# Patient Record
Sex: MALE | Race: ASIAN | NOT HISPANIC OR LATINO
[De-identification: names, ages, dates, MRNs, and addresses within clinical notes are randomized per-mention and may not be internally consistent; named-entity substitution may affect disease eponyms.]

---

## 2017-06-02 ENCOUNTER — APPOINTMENT (OUTPATIENT)
Dept: ELECTROPHYSIOLOGY | Facility: CLINIC | Age: 77
End: 2017-06-02

## 2017-08-22 ENCOUNTER — NON-APPOINTMENT (OUTPATIENT)
Age: 77
End: 2017-08-22

## 2017-08-22 ENCOUNTER — APPOINTMENT (OUTPATIENT)
Dept: ELECTROPHYSIOLOGY | Facility: CLINIC | Age: 77
End: 2017-08-22
Payer: MEDICAID

## 2017-08-22 VITALS
DIASTOLIC BLOOD PRESSURE: 78 MMHG | WEIGHT: 109 LBS | HEIGHT: 66 IN | HEART RATE: 60 BPM | OXYGEN SATURATION: 99 % | SYSTOLIC BLOOD PRESSURE: 128 MMHG | BODY MASS INDEX: 17.52 KG/M2

## 2017-08-22 PROCEDURE — 93283 PRGRMG EVAL IMPLANTABLE DFB: CPT

## 2017-12-18 ENCOUNTER — NON-APPOINTMENT (OUTPATIENT)
Age: 77
End: 2017-12-18

## 2017-12-18 ENCOUNTER — APPOINTMENT (OUTPATIENT)
Dept: ELECTROPHYSIOLOGY | Facility: CLINIC | Age: 77
End: 2017-12-18
Payer: MEDICAID

## 2017-12-18 VITALS
BODY MASS INDEX: 20.55 KG/M2 | HEIGHT: 66 IN | WEIGHT: 127.87 LBS | SYSTOLIC BLOOD PRESSURE: 114 MMHG | OXYGEN SATURATION: 97 % | DIASTOLIC BLOOD PRESSURE: 70 MMHG | HEART RATE: 60 BPM

## 2017-12-18 PROCEDURE — 93283 PRGRMG EVAL IMPLANTABLE DFB: CPT

## 2018-08-14 ENCOUNTER — APPOINTMENT (OUTPATIENT)
Dept: ELECTROPHYSIOLOGY | Facility: CLINIC | Age: 78
End: 2018-08-14
Payer: MEDICAID

## 2018-08-14 VITALS
WEIGHT: 125.66 LBS | SYSTOLIC BLOOD PRESSURE: 125 MMHG | BODY MASS INDEX: 20.28 KG/M2 | OXYGEN SATURATION: 98 % | DIASTOLIC BLOOD PRESSURE: 74 MMHG | HEART RATE: 60 BPM

## 2018-08-14 PROCEDURE — 93283 PRGRMG EVAL IMPLANTABLE DFB: CPT

## 2019-02-20 ENCOUNTER — APPOINTMENT (OUTPATIENT)
Dept: ELECTROPHYSIOLOGY | Facility: CLINIC | Age: 79
End: 2019-02-20

## 2020-04-30 ENCOUNTER — TRANSCRIPTION ENCOUNTER (OUTPATIENT)
Age: 80
End: 2020-04-30

## 2023-05-19 ENCOUNTER — OUTPATIENT (OUTPATIENT)
Dept: OUTPATIENT SERVICES | Facility: HOSPITAL | Age: 83
LOS: 1 days | End: 2023-05-19
Payer: COMMERCIAL

## 2023-05-19 ENCOUNTER — APPOINTMENT (OUTPATIENT)
Dept: CT IMAGING | Facility: HOSPITAL | Age: 83
End: 2023-05-19
Payer: MEDICAID

## 2023-05-19 ENCOUNTER — RESULT REVIEW (OUTPATIENT)
Age: 83
End: 2023-05-19

## 2023-05-19 ENCOUNTER — APPOINTMENT (OUTPATIENT)
Dept: ENDOVASCULAR SURGERY | Facility: CLINIC | Age: 83
End: 2023-05-19
Payer: MEDICAID

## 2023-05-19 DIAGNOSIS — Z95.5 PRESENCE OF CORONARY ANGIOPLASTY IMPLANT AND GRAFT: Chronic | ICD-10-CM

## 2023-05-19 DIAGNOSIS — I71.40 ABDOMINAL AORTIC ANEURYSM, WITHOUT RUPTURE, UNSPECIFIED: ICD-10-CM

## 2023-05-19 DIAGNOSIS — Z95.1 PRESENCE OF AORTOCORONARY BYPASS GRAFT: Chronic | ICD-10-CM

## 2023-05-19 PROCEDURE — 99204 OFFICE O/P NEW MOD 45 MIN: CPT

## 2023-05-19 PROCEDURE — 74174 CTA ABD&PLVS W/CONTRAST: CPT | Mod: 26

## 2023-05-19 PROCEDURE — 74174 CTA ABD&PLVS W/CONTRAST: CPT

## 2023-05-19 NOTE — ASSESSMENT
[FreeTextEntry1] : 83-year-old gentleman with an 8.5 cm abdominal aortic aneurysm.  Given the large size of the aneurysm would recommend urgent CT scan.  Once the results are ascertained patient to be scheduled urgently for repair. \par Patient sent for urgent creatinine.  Patient to continue on aspirin and atorvastatin.

## 2023-05-19 NOTE — PHYSICAL EXAM
[JVD] : no jugular venous distention  [Normal Breath Sounds] : Normal breath sounds [Normal Rate and Rhythm] : normal rate and rhythm [2+] : left 2+ [Ankle Swelling (On Exam)] : not present [Varicose Veins Of Lower Extremities] : not present [] : not present [Abdomen Tenderness] : ~T ~M No abdominal tenderness [No Rash or Lesion] : No rash or lesion [Skin Ulcer] : no ulcer [Alert] : alert [Calm] : calm [de-identified] : Appears well

## 2023-05-19 NOTE — HISTORY OF PRESENT ILLNESS
[FreeTextEntry1] : 83-year-old gentleman former heavy smoker with multiple medical problems including coronary artery disease status post CABG in [place] several years ago, AICD presents to the office today with a report of a large abdominal aortic aneurysm.  The patient's granddaughter states that several weeks ago the patient was lying down and he felt a pulsatile mass in his abdomen.  Patient went a duplex study which showed an 8.5 cm abdominal aortic aneurysm.  Patient does not have complaints of abdominal or back pain.  He presents urgently for evaluation.

## 2023-05-21 ENCOUNTER — INPATIENT (INPATIENT)
Facility: HOSPITAL | Age: 83
LOS: 6 days | Discharge: HOME | DRG: 269 | End: 2023-05-28
Attending: SURGERY | Admitting: SURGERY
Payer: MEDICAID

## 2023-05-21 VITALS
SYSTOLIC BLOOD PRESSURE: 113 MMHG | HEART RATE: 61 BPM | DIASTOLIC BLOOD PRESSURE: 72 MMHG | WEIGHT: 125.66 LBS | TEMPERATURE: 98 F | HEIGHT: 66 IN | RESPIRATION RATE: 18 BRPM | OXYGEN SATURATION: 99 %

## 2023-05-21 DIAGNOSIS — I71.40 ABDOMINAL AORTIC ANEURYSM, WITHOUT RUPTURE, UNSPECIFIED: ICD-10-CM

## 2023-05-21 DIAGNOSIS — Z95.1 PRESENCE OF AORTOCORONARY BYPASS GRAFT: Chronic | ICD-10-CM

## 2023-05-21 DIAGNOSIS — Z95.810 PRESENCE OF AUTOMATIC (IMPLANTABLE) CARDIAC DEFIBRILLATOR: Chronic | ICD-10-CM

## 2023-05-21 DIAGNOSIS — Z95.5 PRESENCE OF CORONARY ANGIOPLASTY IMPLANT AND GRAFT: Chronic | ICD-10-CM

## 2023-05-21 LAB
ALBUMIN SERPL ELPH-MCNC: 3.8 G/DL — SIGNIFICANT CHANGE UP (ref 3.3–5)
ALP SERPL-CCNC: 82 U/L — SIGNIFICANT CHANGE UP (ref 40–120)
ALT FLD-CCNC: 25 U/L — SIGNIFICANT CHANGE UP (ref 10–45)
ANION GAP SERPL CALC-SCNC: 12 MMOL/L — SIGNIFICANT CHANGE UP (ref 5–17)
APTT BLD: 39.4 SEC — HIGH (ref 27.5–35.5)
AST SERPL-CCNC: 35 U/L — SIGNIFICANT CHANGE UP (ref 10–40)
BASOPHILS # BLD AUTO: 0.01 K/UL — SIGNIFICANT CHANGE UP (ref 0–0.2)
BASOPHILS NFR BLD AUTO: 0.2 % — SIGNIFICANT CHANGE UP (ref 0–2)
BILIRUB SERPL-MCNC: 0.5 MG/DL — SIGNIFICANT CHANGE UP (ref 0.2–1.2)
BLD GP AB SCN SERPL QL: POSITIVE — SIGNIFICANT CHANGE UP
BUN SERPL-MCNC: 18 MG/DL — SIGNIFICANT CHANGE UP (ref 7–23)
CALCIUM SERPL-MCNC: 8.8 MG/DL — SIGNIFICANT CHANGE UP (ref 8.4–10.5)
CHLORIDE SERPL-SCNC: 104 MMOL/L — SIGNIFICANT CHANGE UP (ref 96–108)
CO2 SERPL-SCNC: 22 MMOL/L — SIGNIFICANT CHANGE UP (ref 22–31)
CREAT SERPL-MCNC: 0.96 MG/DL — SIGNIFICANT CHANGE UP (ref 0.5–1.3)
EGFR: 78 ML/MIN/1.73M2 — SIGNIFICANT CHANGE UP
EOSINOPHIL # BLD AUTO: 0.27 K/UL — SIGNIFICANT CHANGE UP (ref 0–0.5)
EOSINOPHIL NFR BLD AUTO: 5.6 % — SIGNIFICANT CHANGE UP (ref 0–6)
GLUCOSE SERPL-MCNC: 93 MG/DL — SIGNIFICANT CHANGE UP (ref 70–99)
HCT VFR BLD CALC: 35 % — LOW (ref 39–50)
HGB BLD-MCNC: 11.2 G/DL — LOW (ref 13–17)
IMM GRANULOCYTES NFR BLD AUTO: 0.6 % — SIGNIFICANT CHANGE UP (ref 0–0.9)
INR BLD: 1.43 RATIO — HIGH (ref 0.88–1.16)
LYMPHOCYTES # BLD AUTO: 1.68 K/UL — SIGNIFICANT CHANGE UP (ref 1–3.3)
LYMPHOCYTES # BLD AUTO: 34.8 % — SIGNIFICANT CHANGE UP (ref 13–44)
MAGNESIUM SERPL-MCNC: 1.9 MG/DL — SIGNIFICANT CHANGE UP (ref 1.6–2.6)
MCHC RBC-ENTMCNC: 28.9 PG — SIGNIFICANT CHANGE UP (ref 27–34)
MCHC RBC-ENTMCNC: 32 GM/DL — SIGNIFICANT CHANGE UP (ref 32–36)
MCV RBC AUTO: 90.4 FL — SIGNIFICANT CHANGE UP (ref 80–100)
MONOCYTES # BLD AUTO: 0.78 K/UL — SIGNIFICANT CHANGE UP (ref 0–0.9)
MONOCYTES NFR BLD AUTO: 16.1 % — HIGH (ref 2–14)
NEUTROPHILS # BLD AUTO: 2.06 K/UL — SIGNIFICANT CHANGE UP (ref 1.8–7.4)
NEUTROPHILS NFR BLD AUTO: 42.7 % — LOW (ref 43–77)
NRBC # BLD: 0 /100 WBCS — SIGNIFICANT CHANGE UP (ref 0–0)
PLATELET # BLD AUTO: 172 K/UL — SIGNIFICANT CHANGE UP (ref 150–400)
POTASSIUM SERPL-MCNC: 4.4 MMOL/L — SIGNIFICANT CHANGE UP (ref 3.5–5.3)
POTASSIUM SERPL-SCNC: 4.4 MMOL/L — SIGNIFICANT CHANGE UP (ref 3.5–5.3)
PROT SERPL-MCNC: 7.3 G/DL — SIGNIFICANT CHANGE UP (ref 6–8.3)
PROTHROM AB SERPL-ACNC: 16.7 SEC — HIGH (ref 10.5–13.4)
RBC # BLD: 3.87 M/UL — LOW (ref 4.2–5.8)
RBC # FLD: 14.6 % — HIGH (ref 10.3–14.5)
RH IG SCN BLD-IMP: POSITIVE — SIGNIFICANT CHANGE UP
SODIUM SERPL-SCNC: 138 MMOL/L — SIGNIFICANT CHANGE UP (ref 135–145)
WBC # BLD: 4.83 K/UL — SIGNIFICANT CHANGE UP (ref 3.8–10.5)
WBC # FLD AUTO: 4.83 K/UL — SIGNIFICANT CHANGE UP (ref 3.8–10.5)

## 2023-05-21 PROCEDURE — 71045 X-RAY EXAM CHEST 1 VIEW: CPT | Mod: 26

## 2023-05-21 PROCEDURE — 86077 PHYS BLOOD BANK SERV XMATCH: CPT

## 2023-05-21 PROCEDURE — 99285 EMERGENCY DEPT VISIT HI MDM: CPT

## 2023-05-21 RX ORDER — ISOSORBIDE MONONITRATE 60 MG/1
30 TABLET, EXTENDED RELEASE ORAL DAILY
Refills: 0 | Status: DISCONTINUED | OUTPATIENT
Start: 2023-05-21 | End: 2023-05-25

## 2023-05-21 RX ORDER — CARVEDILOL PHOSPHATE 80 MG/1
3.12 CAPSULE, EXTENDED RELEASE ORAL EVERY 12 HOURS
Refills: 0 | Status: DISCONTINUED | OUTPATIENT
Start: 2023-05-21 | End: 2023-05-25

## 2023-05-21 RX ORDER — PANTOPRAZOLE SODIUM 20 MG/1
40 TABLET, DELAYED RELEASE ORAL
Refills: 0 | Status: DISCONTINUED | OUTPATIENT
Start: 2023-05-21 | End: 2023-05-25

## 2023-05-21 RX ORDER — ATORVASTATIN CALCIUM 80 MG/1
80 TABLET, FILM COATED ORAL AT BEDTIME
Refills: 0 | Status: DISCONTINUED | OUTPATIENT
Start: 2023-05-21 | End: 2023-05-25

## 2023-05-21 RX ORDER — LISINOPRIL 2.5 MG/1
10 TABLET ORAL DAILY
Refills: 0 | Status: DISCONTINUED | OUTPATIENT
Start: 2023-05-21 | End: 2023-05-25

## 2023-05-21 RX ORDER — FUROSEMIDE 40 MG
20 TABLET ORAL DAILY
Refills: 0 | Status: DISCONTINUED | OUTPATIENT
Start: 2023-05-21 | End: 2023-05-25

## 2023-05-21 RX ORDER — HEPARIN SODIUM 5000 [USP'U]/ML
5000 INJECTION INTRAVENOUS; SUBCUTANEOUS EVERY 8 HOURS
Refills: 0 | Status: DISCONTINUED | OUTPATIENT
Start: 2023-05-21 | End: 2023-05-25

## 2023-05-21 RX ORDER — ASPIRIN/CALCIUM CARB/MAGNESIUM 324 MG
81 TABLET ORAL DAILY
Refills: 0 | Status: DISCONTINUED | OUTPATIENT
Start: 2023-05-21 | End: 2023-05-25

## 2023-05-21 RX ADMIN — Medication 81 MILLIGRAM(S): at 23:47

## 2023-05-21 RX ADMIN — CARVEDILOL PHOSPHATE 3.12 MILLIGRAM(S): 80 CAPSULE, EXTENDED RELEASE ORAL at 23:47

## 2023-05-21 RX ADMIN — ATORVASTATIN CALCIUM 80 MILLIGRAM(S): 80 TABLET, FILM COATED ORAL at 23:47

## 2023-05-21 RX ADMIN — HEPARIN SODIUM 5000 UNIT(S): 5000 INJECTION INTRAVENOUS; SUBCUTANEOUS at 23:47

## 2023-05-21 NOTE — H&P ADULT - HISTORY OF PRESENT ILLNESS
Plan:  - admit to Dr. Tellez, floor bed, needs tele floor  - plan for open AAA repair on Wednesday  - no need to repeat CT, recently CTA done  - will need cardiology consult from Dr. Theo Chung for optimization and risk clarification   - will need AICU interrogation   - pending patient eval, still in waiting room  - plan discussed with fellow on call on behalf of Dr. Geoff Hua, Ding PGY-2  Vascular Surgery  [id number]  83-year-old gentleman PMH HTN, HLD, CM, CAD ( S/P 3 vessel CABG 2002), PCI to RCA 4/11/16, life vest x 3 months, former smoker ( 80 pack years),  AICD presented to the Dr. Tellez's office on 5/17/2032 with a report of a large abdominal aortic aneurysm. The patient's granddaughter stated that several weeks ago the patient was lying down and he felt a pulsatile mass in his abdomen. Patient went a duplex study which showed an 8.5 cm abdominal aortic aneurysm. Patient does not have complaints of abdominal or back pain. CTA outpatient on 5/19/2023 showed  infrarenal abdominal aortic aneurysm measuring 7.1 cm in diameter containing a large amount of mural thrombus. Patient presented to ER today for preop monitor and optimization.      83-year-old gentleman PMH HTN, HLD, CM, CAD ( S/P 3 vessel CABG 2002), PCI to RCA 4/11/16, life vest x 3 months, former smoker ( 80 pack years),  AICD presented to the Dr. Tellez's office on 5/17/2032 with a report of a large abdominal aortic aneurysm. The patient's granddaughter stated that several weeks ago the patient was lying down and he felt a pulsatile mass in his abdomen. Patient went a duplex study which showed an 8.5 cm abdominal aortic aneurysm. Patient does not have complaints of abdominal or back pain. CTA outpatient on 5/19/2023 showed  infrarenal abdominal aortic aneurysm measuring 7.1 cm in diameter containing a large amount of mural thrombus. Patient presented to ER today for preop monitor and optimization.     In ER, patient is HDS, no complains, palpable pulsatile mass in mid abd.

## 2023-05-21 NOTE — ED PROVIDER NOTE - OBJECTIVE STATEMENT
83-year-old gentleman PMH HTN, HLD, CM, CAD ( S/P 3 vessel CABG 2002), PCI to RCA 4/11/16, life vest x 3 months, former smoker ( 80 pack years),  AICD presented to the Dr. Tellez's office on 5/17/2032 with a report of a large abdominal aortic aneurysm. The patient's granddaughter stated that several weeks ago the patient was lying down and he felt a pulsatile mass in his abdomen. Patient went a duplex study which showed an 8.5 cm abdominal aortic aneurysm. Patient does not have complaints of abdominal or back pain. CTA outpatient on 5/19/2023 showed  infrarenal abdominal aortic aneurysm measuring 7.1 cm in diameter containing a large amount of mural thrombus. Patient presented to ER today for preop monitor and optimization.

## 2023-05-21 NOTE — ED ADULT NURSE NOTE - NSFALLUNIVINTERV_ED_ALL_ED
Bed/Stretcher in lowest position, wheels locked, appropriate side rails in place/Call bell, personal items and telephone in reach/Instruct patient to call for assistance before getting out of bed/chair/stretcher/Non-slip footwear applied when patient is off stretcher/[place] to call system/Physically safe environment - no spills, clutter or unnecessary equipment/Purposeful proactive rounding/Room/bathroom lighting operational, light cord in reach

## 2023-05-21 NOTE — ED ADULT NURSE NOTE - OBJECTIVE STATEMENT
Pt is an 83y M PMH HTN, HLD, AAA, CAD s/p CABG sent for pre-op blood work for AAA repair. Pt denies any complaints at this time. Sent for pre-op lab work. 20G IV placed in R AC. A&Ox4, [place], lungs clear, distal pulses intact, abdomen soft, skin intact. Side rails up for safety, call bell and personal items within reach, instructed to call for assistance, verbalizes understanding. Will continue to monitor.

## 2023-05-21 NOTE — H&P ADULT - NSHPSOCIALHISTORY_GEN_ALL_CORE
living at home with wife, independent  former smoker, 80 pack years, quitted on 2002  denies alcohol use

## 2023-05-21 NOTE — ED PROVIDER NOTE - ATTENDING CONTRIBUTION TO CARE
I, Najma Pham, performed a history and physical exam of the patient and discussed their management with the resident and /or advanced care provider. I reviewed the resident and /or ACP's note and agree with the documented findings and plan of care. I was present and available for all procedures.     83-year-old male with history of hypertension, hyperlipidemia, CM, CAD status post three-vessel CABG in 2002, PCI to RCA/11/16, LifeVest x3 months, former smoker, AICD presenting to the ED for admission.  Patient seen by Dr. Tellez on 5/17/2023 with reported large abdominal aortic aneurysm.  Granddaughter reports several weeks ago patient was lying down and he felt a pulsatile mass in his abdomen.  Had a duplex study which showed an 8.5 cm abdominal aortic aneurysm.  Patient with no abdominal complaints back pain fever chills chest pain lightheadedness or dizziness.  CTA outpatient 5/19/2023 showed infrarenal abdominal aortic aneurysm measuring 7.1 cm in diameter containing a large amount of mural thrombus.  Patient sent to the ED today for preop monitoring optimization for surgical repair later this week.  Well-appearing in no acute distress resting comfortably on stretcher.  Regular rate and rhythm, lungs clear to auscultation bilaterally speaking in full sentences without increased work of breathing.  Saturating well on room air.  Abdomen soft nontender no rebound or guarding.  No CVA tenderness.  No lower extremity edema.  Will obtain preop labs, vascular surgery consultation and admission.

## 2023-05-21 NOTE — H&P ADULT - NSICDXFAMILYHX_GEN_ALL_CORE_FT
FAMILY HISTORY:  Sibling  Still living? Unknown  Diabetes mellitus, Age at diagnosis: Age Unknown

## 2023-05-21 NOTE — H&P ADULT - ASSESSMENT
83-year-old gentleman PMH HTN, HLD, CM, CAD ( S/P 3 vessel CABG 2002), PCI to RCA 4/11/16, life vest x 3 months, former smoker ( 80 pack years),  AICD presented to the Dr. Tellez's office on 5/17/2032 with a report of a large abdominal aortic aneurysm. The patient's granddaughter stated that several weeks ago the patient was lying down and he felt a pulsatile mass in his abdomen. Patient went a duplex study which showed an 8.5 cm abdominal aortic aneurysm. Patient does not have complaints of abdominal or back pain. CTA outpatient on 5/19/2023 showed  infrarenal abdominal aortic aneurysm measuring 7.1 cm in diameter containing a large amount of mural thrombus. Patient presented to ER today for preop monitor and optimization.     In ER, patient is HDS, no complains, palpable pulsatile mass in mid abd.     Plan:  - admit to vascular surgery, Dr. Tellez, floor bed with telemetry   - planning for open AAA repair on Wednesday (5/24/2023)  - no need for CTA, had recently CTA on 5/19/2023  - will need cardiology consult from Dr. Theo Chung for risk stratification and optimization  - will need acid interrogation before surgery  - will need medicine consult for risk stratification and optimization  - preop labs  - regular diet  - plan discussed with fellow on call on behalf of Dr. Geoff Hua, Ding PGY-2  Vascular Surgery  [id number]  83-year-old gentleman PMH HTN, HLD, CM, CAD ( S/P 3 vessel CABG 2002), PCI to RCA 4/11/16, life vest x 3 months, former smoker ( 80 pack years),  AICD presented to the Dr. Tellez's office on 5/17/2032 with a report of a large abdominal aortic aneurysm. The patient's granddaughter stated that several weeks ago the patient was lying down and he felt a pulsatile mass in his abdomen. Patient went a duplex study which showed an 8.5 cm abdominal aortic aneurysm. Patient does not have complaints of abdominal or back pain. CTA outpatient on 5/19/2023 showed  infrarenal abdominal aortic aneurysm measuring 7.1 cm in diameter containing a large amount of mural thrombus. Patient presented to ER today for preop monitor and optimization.     In ER, patient is HDS, no complains, palpable pulsatile mass in mid abd.     Plan:  - admit to vascular surgery, Dr. Tellez, floor bed with telemetry   - planning for EVAR on Wednesday (5/24/2023)  - no need for CTA, had recently CTA on 5/19/2023  - will need cardiology consult from Dr. Theo Chung for risk stratification and optimization  - will need acid interrogation before surgery  - will need medicine consult for risk stratification and optimization  - preop labs  - regular diet  - plan discussed with fellow on call on behalf of Dr. Geoff Hua, Ding PGY-2  Vascular Surgery  [id number]

## 2023-05-21 NOTE — H&P ADULT - NSHPPHYSICALEXAM_GEN_ALL_CORE
General: NAD, Lying in bed comfortably  Neuro: A+Ox3  HEENT: NC/AT, EOMI  Neck: Soft, supple  Cardio: RRR, nml S1/S2  Resp: Good effort, CTA b/l  Thorax: No chest wall tenderness  Breast: No lesions/masses, no drainage  GI/Abd: Soft, NT/ND, no rebound/guarding,  palpable pulsatile mass in mid abd.   Vascular: All 4 extremities warm. b/l palpable fem/pop/dp, no wound  Skin: Intact, no breakdown  Lymphatic/Nodes: No palpable lymphadenopathy  Musculoskeletal: All 4 extremities moving spontaneously, no limitations, sensation and motion intact

## 2023-05-21 NOTE — ED PROVIDER NOTE - NSICDXPASTSURGICALHX_GEN_ALL_CORE_FT
PAST SURGICAL HISTORY:  S/P CABG x 3 s/p NAWAF procedure (LV endoaneurysmorraphy    Stented coronary artery PCI RCA 4/2016

## 2023-05-21 NOTE — H&P ADULT - NSICDXPASTMEDICALHX_GEN_ALL_CORE_FT
PAST MEDICAL HISTORY:  BEE (dyspnea on exertion)     Former cigarette smoker     HLD (hyperlipidemia)     Hypertension     Systolic dysfunction

## 2023-05-21 NOTE — ED PROVIDER NOTE - PHYSICAL EXAMINATION
PHYSICAL EXAM:  CONSTITUTIONAL: Well appearing, awake, alert, oriented to person, place, time/situation and in no apparent distress.  HEAD: Atraumatic  EYES: Clear bilaterally, pupils equal, round and reactive to light.  ENMT: Airway patent, Nasal mucosa clear. Mouth with normal mucosa. Uvula is midline.   CARDIAC: Normal visible implanted icd   RESPIRATORY: Breathing unlabored.   ABDOMEN:  soft distended nontender   NEUROLOGICAL: Alert and oriented, grossly nonfocal

## 2023-05-21 NOTE — ED PROVIDER NOTE - CLINICAL SUMMARY MEDICAL DECISION MAKING FREE TEXT BOX
83-year-old gentleman with past medical history of hypertension hyperlipidemia CAD implanted defibrillator

## 2023-05-21 NOTE — H&P ADULT - NSICDXPASTSURGICALHX_GEN_ALL_CORE_FT
PAST SURGICAL HISTORY:  AICD (automatic cardioverter/defibrillator) present     S/P CABG x 3 s/p NAWAF procedure (LV endoaneurysmorraphy    Stented coronary artery PCI RCA 4/2016

## 2023-05-21 NOTE — H&P ADULT - NSHPLABSRESULTS_GEN_ALL_CORE
< from: CT Angio Abdomen and Pelvis w/ IV Cont (05.19.23 @ 15:12) >      ACC: [id number] EXAM:  CT ANGIO ABD PELV (W)AW IC   ORDERED BY: PURNIMA WRIGHTIN     PROCEDURE DATE:  05/19/2023          INTERPRETATION:  CLINICAL INFORMATION: Abdominal aortic aneurysm    COMPARISON: None.    CONTRAST/COMPLICATIONS:  IV Contrast: Omnipaque 350  110 cc administered   90 cc discarded  Oral Contrast: NONE  Complications: None reported at time of study completion    PROCEDURE:  CT Angiography of the Abdomen, pelvis and lower extremities.  Arterial phase images were acquired.  Sagittal and coronal reformats were performed as well as 3D (MIP)   reconstructions.    FINDINGS:  LOWER CHEST: 8 mm spiculated right lower lobe pulmonary nodule. A few   additional tiny nodules in both lungs, for example a 2 mm right lower   lobe pulmonary nodule (5; 62). Subpleural reticular opacities compatible   with interstitial lung disease. ICD leads noted. Status post sternotomy.   Cardiomegaly. Native coronary artery calcifications. Status post wedge   resection in the lingula.    LIVER: Within normal limits.  BILE DUCTS: Normal caliber.  GALLBLADDER: Within normal limits.  SPLEEN: Within normal limits.  PANCREAS: Within normal limits.  ADRENALS: Within normal limits.  KIDNEYS/URETERS: Bilateral renal cysts.    BLADDER: Within normal limits.  REPRODUCTIVE ORGANS: Prostate is moderately enlarged.    BOWEL: No bowel obstruction. Appendix is normal.  PERITONEUM: No ascites.  VESSELS: Infrarenal abdominal aortic aneurysm measuring 7.1 cm in   diameter containing a large amount of mural thrombus. There is a neck of   2 cm between the left renal artery origin and the takeoff of the   aneurysm. The aneurysm does not involve the iliac bifurcation or common   iliac arteries. The bilateral common, external and internal iliac   arteries demonstrate no significant stenosis. The common femoral,   profunda femoral and superficial femoral arteries are normal in caliber   and demonstrate no significant narrowing although note that the distal   femoral and popliteal arteries are not well visualized due to poor   contrast opacification.  RETROPERITONEUM/LYMPH NODES: No lymphadenopathy.  ABDOMINAL WALL: Within normal limits.  BONES: Within normal limits.    IMPRESSION:  7.1 cm infrarenal abdominal aortic aneurysm as detailed above.  8mm spiculated nodule in the right lower lobe worrisome for small primary   lung cancer.    Findings were discussed with Dr. ALFONSO 5/19/2023 3:58 PM by Dr. Pineda   with read back confirmation.    --- End of Report ---            LEE PINEDA MD; Attending Radiologist  This document has been electronically signed. May 19 2023  3:58PM    < end of copied text >

## 2023-05-22 DIAGNOSIS — I10 ESSENTIAL (PRIMARY) HYPERTENSION: ICD-10-CM

## 2023-05-22 DIAGNOSIS — I71.40 ABDOMINAL AORTIC ANEURYSM, WITHOUT RUPTURE, UNSPECIFIED: ICD-10-CM

## 2023-05-22 DIAGNOSIS — Z95.810 PRESENCE OF AUTOMATIC (IMPLANTABLE) CARDIAC DEFIBRILLATOR: ICD-10-CM

## 2023-05-22 DIAGNOSIS — I25.5 ISCHEMIC CARDIOMYOPATHY: ICD-10-CM

## 2023-05-22 DIAGNOSIS — I34.0 NONRHEUMATIC MITRAL (VALVE) INSUFFICIENCY: ICD-10-CM

## 2023-05-22 LAB
ANION GAP SERPL CALC-SCNC: 9 MMOL/L — SIGNIFICANT CHANGE UP (ref 5–17)
BLD GP AB SCN SERPL QL: POSITIVE — SIGNIFICANT CHANGE UP
BUN SERPL-MCNC: 16 MG/DL — SIGNIFICANT CHANGE UP (ref 7–23)
CALCIUM SERPL-MCNC: 8.9 MG/DL — SIGNIFICANT CHANGE UP (ref 8.4–10.5)
CHLORIDE SERPL-SCNC: 104 MMOL/L — SIGNIFICANT CHANGE UP (ref 96–108)
CO2 SERPL-SCNC: 26 MMOL/L — SIGNIFICANT CHANGE UP (ref 22–31)
CREAT SERPL-MCNC: 0.94 MG/DL — SIGNIFICANT CHANGE UP (ref 0.5–1.3)
EGFR: 80 ML/MIN/1.73M2 — SIGNIFICANT CHANGE UP
GLUCOSE SERPL-MCNC: 78 MG/DL — SIGNIFICANT CHANGE UP (ref 70–99)
HCT VFR BLD CALC: 37.5 % — LOW (ref 39–50)
HGB BLD-MCNC: 12.1 G/DL — LOW (ref 13–17)
MAGNESIUM SERPL-MCNC: 2 MG/DL — SIGNIFICANT CHANGE UP (ref 1.6–2.6)
MCHC RBC-ENTMCNC: 29.2 PG — SIGNIFICANT CHANGE UP (ref 27–34)
MCHC RBC-ENTMCNC: 32.3 GM/DL — SIGNIFICANT CHANGE UP (ref 32–36)
MCV RBC AUTO: 90.6 FL — SIGNIFICANT CHANGE UP (ref 80–100)
NRBC # BLD: 0 /100 WBCS — SIGNIFICANT CHANGE UP (ref 0–0)
PHOSPHATE SERPL-MCNC: 3.7 MG/DL — SIGNIFICANT CHANGE UP (ref 2.5–4.5)
PLATELET # BLD AUTO: 180 K/UL — SIGNIFICANT CHANGE UP (ref 150–400)
POTASSIUM SERPL-MCNC: 4 MMOL/L — SIGNIFICANT CHANGE UP (ref 3.5–5.3)
POTASSIUM SERPL-SCNC: 4 MMOL/L — SIGNIFICANT CHANGE UP (ref 3.5–5.3)
RBC # BLD: 4.14 M/UL — LOW (ref 4.2–5.8)
RBC # FLD: 14.4 % — SIGNIFICANT CHANGE UP (ref 10.3–14.5)
RH IG SCN BLD-IMP: POSITIVE — SIGNIFICANT CHANGE UP
SODIUM SERPL-SCNC: 139 MMOL/L — SIGNIFICANT CHANGE UP (ref 135–145)
WBC # BLD: 5.4 K/UL — SIGNIFICANT CHANGE UP (ref 3.8–10.5)
WBC # FLD AUTO: 5.4 K/UL — SIGNIFICANT CHANGE UP (ref 3.8–10.5)

## 2023-05-22 PROCEDURE — 93306 TTE W/DOPPLER COMPLETE: CPT | Mod: 26

## 2023-05-22 RX ADMIN — HEPARIN SODIUM 5000 UNIT(S): 5000 INJECTION INTRAVENOUS; SUBCUTANEOUS at 07:05

## 2023-05-22 RX ADMIN — HEPARIN SODIUM 5000 UNIT(S): 5000 INJECTION INTRAVENOUS; SUBCUTANEOUS at 14:39

## 2023-05-22 RX ADMIN — ATORVASTATIN CALCIUM 80 MILLIGRAM(S): 80 TABLET, FILM COATED ORAL at 22:37

## 2023-05-22 RX ADMIN — CARVEDILOL PHOSPHATE 3.12 MILLIGRAM(S): 80 CAPSULE, EXTENDED RELEASE ORAL at 11:02

## 2023-05-22 RX ADMIN — PANTOPRAZOLE SODIUM 40 MILLIGRAM(S): 20 TABLET, DELAYED RELEASE ORAL at 06:05

## 2023-05-22 RX ADMIN — LISINOPRIL 10 MILLIGRAM(S): 2.5 TABLET ORAL at 06:05

## 2023-05-22 RX ADMIN — Medication 81 MILLIGRAM(S): at 11:02

## 2023-05-22 RX ADMIN — Medication 20 MILLIGRAM(S): at 06:05

## 2023-05-22 RX ADMIN — HEPARIN SODIUM 5000 UNIT(S): 5000 INJECTION INTRAVENOUS; SUBCUTANEOUS at 22:37

## 2023-05-22 NOTE — CONSULT NOTE ADULT - ASSESSMENT
83-year-old gentleman PMH HTN, HLD, CM, CAD ( S/P 3 vessel CABG 2002), PCI to RCA 4/11/16, life vest x 3 months, former smoker ( 80 pack years),  AICD presented to the Dr. Tellez's office on 5/17/2032 with a report of a large abdominal aortic aneurysm. The patient's granddaughter stated that several weeks ago the patient was lying down and he felt a pulsatile mass in his abdomen. Patient went a duplex study which showed an 8.5 cm abdominal aortic aneurysm. Patient does not have complaints of abdominal or back pain. CTA outpatient on 5/19/2023 showed  infrarenal abdominal aortic aneurysm measuring 7.1 cm in diameter containing a large amount of mural thrombus. Patient presented to ER today for preop monitor and optimization.     In ER, patient is HDS, no complains, palpable pulsatile mass in mid abd.     Denies exertional chest pain, shortness of breath.

## 2023-05-22 NOTE — CONSULT NOTE ADULT - ASSESSMENT
Patient is an 83 year old male with a PMHx of HTN, HLD, CM, CAD ( S/P 3 vessel CABG 2002), PCI to RCA 04/11/2016, life vest x 3 months, former smoker ( 80 pack years),  AICD who presented to his outpatient vascular surgeon's office, Dr. Tellez, on 05/17/2023. Patient presented with a reports of a large abdominal aortic aneurysm. Per chart review, patient reported a pulsatile mass in his abdomen. Patient underwent a duplex study that demonstrated an 8.5 cm abdominal aortic aneurysm. Patient underwent CTA as an outpatient on 05/19/2023 and was found to have a 7.1 cm infrarenal aortic aneurysm containing a large mural thrombus. Patient presents to [facility] for elective repair with vascular surgery. Internal Medicine has been consulted on Mr. Nieto's care for medical management. Patient denies abdominal or back pain. Denies chest pain, palpitations, shortness of breath or dyspnea.         Infrarenal AAA associated with Mural Thrombus  - CTA with 7.1 cm infrarenal abdominal aortic aneurysm   - BP control   - Monitor on telemetry   - Planned for EVAR with Vascular Surgery on 05/24  - Care as per vascular surgery appreciated     CAD S/P CABG, Ischemic Cardiomyopathy   - S/P PCI to RCA in 2016, S/P AICD placement   - TTE from 2016 with Moderate to severe MR, Mild AR, Severe LV systolic dysfunction, akinesis, Stage 1 diastolic dysfunction, Minimal TR, Minimal VA, Mild Pulm HTN   - On ASA and Statin  - GDMT as per Cardio - On Lasix, Coreg, Lisinopril    - Monitor on telemetry   - F/u repeat TTE     Moderate - Severe Mitral Regurgitation  - Seen on TTE from 2016  - F/u repeat TTE    Abnormal CT  - CTA with -- 8mm spiculated nodule in the right lower lobe worrisome for small primary lung cancer. --   - Pulm eval recommended    Anemia  - Monitor H/H closely, monitor for gross bleeding  - Continue to monitor and trend levels  - Transfuse for Hgb <8.0 in view of CAD     HTN   - On Lisinopril 10, Imdur 30, Lasix 20 PO, Coreg 3.125 BID   - Monitor BP, VS and patient closely     HLD  - C/w Lipitor 80     Pre-OP Risk Stratification   - Pending repeat TTE   - AICD interrogation     PPX  - PPI and Hep 5K Q 8        Patient is an 83 year old male with a PMHx of HTN, HLD, CM, CAD ( S/P 3 vessel CABG 2002), PCI to RCA 04/11/2016, life vest x 3 months, former smoker ( 80 pack years),  AICD who presented to his outpatient vascular surgeon's office, Dr. Tellez, on 05/17/2023. Patient presented with a reports of a large abdominal aortic aneurysm. Per chart review, patient reported a pulsatile mass in his abdomen. Patient underwent a duplex study that demonstrated an 8.5 cm abdominal aortic aneurysm. Patient underwent CTA as an outpatient on 05/19/2023 and was found to have a 7.1 cm infrarenal aortic aneurysm containing a large mural thrombus. Patient presents to [facility] for elective repair with vascular surgery. Internal Medicine has been consulted on Mr. Nieto's care for medical management. Patient denies abdominal or back pain. Denies chest pain, palpitations, shortness of breath or dyspnea.         Infrarenal AAA associated with Mural Thrombus  - CTA with 7.1 cm infrarenal abdominal aortic aneurysm   - BP control   - Monitor on telemetry   - Planned for EVAR with Vascular Surgery on 05/24  - Care as per vascular surgery appreciated     CAD S/P CABG, Ischemic Cardiomyopathy   - S/P PCI to RCA in 2016, S/P AICD placement   - TTE from 2016 with Moderate to severe MR, Mild AR, Severe LV systolic dysfunction, akinesis, Stage 1 diastolic dysfunction, Minimal TR, Minimal DC, Mild Pulm HTN   - On ASA and Statin  - GDMT as per Cardio - On Lasix, Coreg, Lisinopril; Consider holding ACEI for 48 hours and starting Entresto following 48 hour washout period for further GDMT.   - Monitor on telemetry   - F/u repeat TTE     Moderate - Severe Mitral Regurgitation  - Seen on TTE from 2016  - F/u repeat TTE    Abnormal CT  - CTA with -- 8mm spiculated nodule in the right lower lobe worrisome for small primary lung cancer. --   - Pulmonary eval recommended    Anemia  - Monitor H/H closely, monitor for gross bleeding  - Continue to monitor and trend levels  - Transfuse for Hgb <8.0 in view of CAD     HTN   - On Lisinopril 10, Imdur 30, Lasix 20 PO, Coreg 3.125 BID   - Monitor BP, VS and patient closely     HLD  - C/w Lipitor 80     Pre-OP Risk Stratification   - Pending repeat TTE   - AICD interrogation     PPX  - PPI and Hep 5K Q 8     Discussed with Attending.    Patient is an 83 year old male with a PMHx of HTN, HLD, CM, CAD ( S/P 3 vessel CABG 2002), PCI to RCA 04/11/2016, life vest x 3 months, former smoker ( 80 pack years),  AICD who presented to his outpatient vascular surgeon's office, Dr. Tellez, on 05/17/2023. Patient presented with a reports of a large abdominal aortic aneurysm. Per chart review, patient reported a pulsatile mass in his abdomen. Patient underwent a duplex study that demonstrated an 8.5 cm abdominal aortic aneurysm. Patient underwent CTA as an outpatient on 05/19/2023 and was found to have a 7.1 cm infrarenal aortic aneurysm containing a large mural thrombus. Patient presents to [facility] for elective repair with vascular surgery. Internal Medicine has been consulted on Mr. Nieto's care for medical management. Patient denies abdominal or back pain. Denies chest pain, palpitations, shortness of breath or dyspnea.         Infrarenal AAA associated with Mural Thrombus  - CTA with 7.1 cm infrarenal abdominal aortic aneurysm   - BP control   - Monitor on telemetry   - Planned for EVAR with Vascular Surgery on 05/24  - Care as per vascular surgery appreciated     CAD S/P CABG, Ischemic Cardiomyopathy   - S/P PCI to RCA in 2016, S/P AICD placement   - TTE from 2016 with Moderate to severe MR, Mild AR, Severe LV systolic dysfunction, akinesis, Stage 1 diastolic dysfunction, Minimal TR, Minimal HI, Mild Pulm HTN   - On ASA and Statin  - GDMT as per Cardio - On Lasix, Coreg, Lisinopril; Consider holding ACEI for 48 hours and starting Entresto following 48 hour washout period for further GDMT.   - Monitor on telemetry   - F/u repeat TTE     Moderate - Severe Mitral Regurgitation  - Seen on TTE from 2016  - F/u repeat TTE    Abnormal CT, Pulm nodule and ILD   - CTA with -- 8mm spiculated nodule in the right lower lobe worrisome for small primary lung cancer. --   - Pulmonary eval recommended    Anemia  - Monitor H/H closely, monitor for gross bleeding  - Continue to monitor and trend levels  - Transfuse for Hgb <8.0 in view of CAD     HTN   - On Lisinopril 10, Imdur 30, Lasix 20 PO, Coreg 3.125 BID   - Monitor BP, VS and patient closely     HLD  - C/w Lipitor 80     Pre-OP Risk Stratification   - Pending repeat TTE   - AICD interrogation     PPX  - PPI and Hep 5K Q 8     Discussed with Attending.    Patient is an 83 year old male with a PMHx of HTN, HLD, CM, CAD ( S/P 3 vessel CABG 2002), PCI to RCA 04/11/2016, life vest x 3 months, former smoker ( 80 pack years),  AICD who presented to his outpatient vascular surgeon's office, Dr. Tellez, on 05/17/2023. Patient presented with a reports of a large abdominal aortic aneurysm. Per chart review, patient reported a pulsatile mass in his abdomen. Patient underwent a duplex study that demonstrated an 8.5 cm abdominal aortic aneurysm. Patient underwent CTA as an outpatient on 05/19/2023 and was found to have a 7.1 cm infrarenal aortic aneurysm containing a large mural thrombus. Patient presents to [facility] for elective repair with vascular surgery. Internal Medicine has been consulted on Mr. Nieto's care for medical management. Patient denies abdominal or back pain. Denies chest pain, palpitations, shortness of breath or dyspnea.         Infrarenal AAA associated with Mural Thrombus  - CTA with 7.1 cm infrarenal abdominal aortic aneurysm   - BP control   - Monitor on telemetry   - Planned for EVAR with Vascular Surgery on 05/24  - Care as per vascular surgery appreciated     CAD S/P CABG, Ischemic Cardiomyopathy   - S/P PCI to RCA in 2016, S/P AICD placement   - TTE from 2016 with Moderate to severe MR, Mild AR, Severe LV systolic dysfunction, akinesis, Stage 1 diastolic dysfunction, Minimal TR, Minimal MD, Mild Pulm HTN   - On ASA and Statin  - GDMT as per Cardio - On Lasix, Coreg, Lisinopril; Consider holding ACEI for 48 hours and starting Entresto following 48 hour washout period for further GDMT.   - Monitor on telemetry   - F/u repeat TTE     Moderate - Severe Mitral Regurgitation  - Seen on TTE from 2016  - F/u repeat TTE    Abnormal CT, Pulm nodule and ILD   - CTA with -- 8mm spiculated nodule in the right lower lobe worrisome for small primary lung cancer. --   - Pulmonary eval recommended    Anemia  - Monitor H/H closely, monitor for gross bleeding  - Continue to monitor and trend levels  - Transfuse for Hgb <8.0 in view of CAD     HTN   - On Lisinopril 10, Imdur 30, Lasix 20 PO, Coreg 3.125 BID   - Monitor BP, VS and patient closely     HLD  - C/w Lipitor 80     Pre-OP Risk Stratification   - Pending repeat TTE   - AICD interrogation     PPX  - PPI and Hep 5K Q 8

## 2023-05-22 NOTE — CONSULT NOTE ADULT - SUBJECTIVE AND OBJECTIVE BOX
HPI:  Patient is an 83 year old male with a PMHx of HTN, HLD, CM, CAD ( S/P 3 vessel CABG 2002), PCI to RCA 04/11/2016, life vest x 3 months, former smoker ( 80 pack years),  AICD who presented to his outpatient vascular surgeon's office, Dr. Tellez, on 05/17/2023. Patient presented with a reports of a large abdominal aortic aneurysm. Per chart review, patient reported a pulsatile mass in his abdomen. Patient underwent a duplex study that demonstrated an 8.5 cm abdominal aortic aneurysm. Patient underwent CTA as an outpatient on 05/19/2023 and was found to have a 7.1 cm infrarenal aortic aneurysm containing a large mural thrombus. Patient presents to [facility] for elective repair with vascular surgery. Internal Medicine has been consulted on Mr. Nieto's care for medical management. Patient denies abdominal or back pain. Denies chest pain, palpitations, shortness of breath or dyspnea.     REVIEW OF SYSTEMS:    CONSTITUTIONAL: No weakness, fevers or chills  EYES/ENT: No visual changes;  No vertigo or throat pain   NECK: No pain or stiffness  RESPIRATORY: No cough, wheezing, hemoptysis; No shortness of breath  CARDIOVASCULAR: No chest pain or palpitations  GASTROINTESTINAL: + Pulsatile mass in abdomen; 7.1 CM infrarenal AAA; Denies abdominal or epigastric pain. No nausea, vomiting, or hematemesis   GENITOURINARY: No dysuria, frequency or hematuria  NEUROLOGICAL: No numbness or weakness  SKIN: No itching, burning, rashes, or lesions   All other review of systems is negative unless indicated above.      PAST MEDICAL & SURGICAL HISTORY:  Hypertension  HLD (hyperlipidemia)  Systolic dysfunction  BEE (dyspnea on exertion)  Former cigarette smoker  S/P CABG x 3  s/p NAWAF procedure (LV endoaneurysmorraphy  Stented coronary artery PCI RCA 4/2016  AICD (automatic cardioverter/defibrillator) present    MEDICATIONS  (STANDING):  aspirin  chewable 81 milliGRAM(s) Oral daily  atorvastatin 80 milliGRAM(s) Oral at bedtime  carvedilol 3.125 milliGRAM(s) Oral every 12 hours  furosemide    Tablet 20 milliGRAM(s) Oral daily  heparin   Injectable 5000 Unit(s) SubCutaneous every 8 hours  isosorbide   mononitrate ER Tablet (IMDUR) 30 milliGRAM(s) Oral daily  lisinopril 10 milliGRAM(s) Oral daily  pantoprazole    Tablet 40 milliGRAM(s) Oral before breakfast        Allergies    No Known Allergies    Intolerances            Vital Signs Last 24 Hrs  T(C): 36.2 (22 May 2023 09:10), Max: 36.7 (21 May 2023 21:02)  T(F): 97.2 (22 May 2023 09:10), Max: 98 (21 May 2023 21:02)  HR: 60 (22 May 2023 10:50) (60 - 77)  BP: 104/57 (22 May 2023 10:50) (96/60 - 118/76)  BP(mean): --  RR: 18 (22 May 2023 09:10) (18 - 18)  SpO2: 96% (22 May 2023 09:10) (96% - 99%)    Parameters below as of 22 May 2023 09:10  Patient On (Oxygen Delivery Method): room air          Appearance: Normal; OOB TC 	  HEENT:   Normal oral mucosa, Eyes are open 	  Lymphatic: No lymphadenopathy , no edema  Cardiovascular: Normal S1 S2, No JVD, No murmurs   Respiratory: Lungs clear to auscultation, normal effort 	  Gastrointestinal:  Soft, Non-tender, Pulsatile   Skin: No rashes, No ecchymoses, No cyanosis, warm to touch  Musculoskeletal: Normal range of motion, normal strength  Psychiatry:  Mood & affect appropriate  Ext: No edema                            12.1   5.40  )-----------( 180      ( 22 May 2023 07:12 )             37.5               05-22    139  |  104  |  16  ----------------------------<  78  4.0   |  26  |  0.94    Ca    8.9      22 May 2023 07:10  Phos  3.7     05-22  Mg     2.0     05-22    TPro  7.3  /  Alb  3.8  /  TBili  0.5  /  DBili  x   /  AST  35  /  ALT  25  /  AlkPhos  82  05-21    PT/INR - ( 21 May 2023 21:18 )   PT: 16.7 sec;   INR: 1.43 ratio         PTT - ( 21 May 2023 21:18 )  PTT:39.4 sec                       < from: CT Angio Abdomen and Pelvis w/ IV Cont (05.19.23 @ 15:12) >    ACC: [id number] EXAM:  CT ANGIO ABD PELV (W)AW IC   ORDERED BY: PURNIMA MENDIOLA     PROCEDURE DATE:  05/19/2023          INTERPRETATION:  CLINICAL INFORMATION: Abdominal aortic aneurysm    COMPARISON: None.    CONTRAST/COMPLICATIONS:  IV Contrast: Omnipaque 350  110 cc administered   90 cc discarded  Oral Contrast: NONE  Complications: None reported at time of study completion    PROCEDURE:  CT Angiography of the Abdomen, pelvis and lower extremities.  Arterial phase images were acquired.  Sagittal and coronal reformats were performed as well as 3D (MIP)   reconstructions.    FINDINGS:  LOWER CHEST: 8 mm spiculated right lower lobe pulmonary nodule. A few   additional tiny nodules in both lungs, for example a 2 mm right lower   lobe pulmonary nodule (5; 62). Subpleural reticular opacities compatible   with interstitial lung disease. ICD leads noted. Status post sternotomy.   Cardiomegaly. Native coronary artery calcifications. Status post wedge   resection in the lingula.    LIVER: Within normal limits.  BILE DUCTS: Normal caliber.  GALLBLADDER: Within normal limits.  SPLEEN: Within normal limits.  PANCREAS: Within normal limits.  ADRENALS: Within normal limits.  KIDNEYS/URETERS: Bilateral renal cysts.    BLADDER: Within normal limits.  REPRODUCTIVE ORGANS: Prostate is moderately enlarged.    BOWEL: No bowel obstruction. Appendix is normal.  PERITONEUM: No ascites.  VESSELS: Infrarenal abdominal aortic aneurysm measuring 7.1 cm in   diameter containing a large amount of mural thrombus. There is a neck of   2 cm between the left renal artery origin and the takeoff of the   aneurysm. The aneurysm does not involve the iliac bifurcation or common   iliac arteries. The bilateral common, external and internal iliac   arteries demonstrate no significant stenosis. The common femoral,   profunda femoral and superficial femoral arteries are normal in caliber   and demonstrate no significant narrowing although note that the distal   femoral and popliteal arteries are not well visualized due to poor   contrast opacification.  RETROPERITONEUM/LYMPH NODES: No lymphadenopathy.  ABDOMINAL WALL: Within normal limits.  BONES: Within normal limits.    IMPRESSION:  7.1 cm infrarenal abdominal aortic aneurysm as detailed above.  8mm spiculated nodule in the right lower lobe worrisome for small primary   lung cancer.    Findings were discussed with Dr. TELLEZ 5/19/2023 3:58 PM by Dr. Link   with read back confirmation.    --- End of Report ---            LEE LINK MD; Attending Radiologist  This document has been electronically signed. May 19 2023  3:58PM    < end of copied text >              < from: Transthoracic Echocardiogram (07.15.16 @ 09:27) >    Patient name: CATHY NIETO  YOB: 1940   Age: 76 (M)   MR#: [id number]  Study Date: 7/15/2016  Location: O/PSonographer: Frances Cedeño JEANNINE  Study quality: Technically difficult  Referring Physician: Rosa Reyes MD  Blood Pressure: 135/68 mmHg  Height: 168 cm  Weight: 59 kg  BSA: 1.7 m2  ------------------------------------------------------------------------  PROCEDURE: Transthoracic echocardiogram with 2-D, M-Mode  and complete spectral and color flow Doppler. Intravenous  catheter inserted. Verbal consent was obtained for  injection of echo contrast following a discussion of risks  and benefits. Following intravenous injection of contrast,  harmonic imaging was performed.  INDICATION: Cardiomyopathy, unspecified (I42.9)  ------------------------------------------------------------------------  Dimensions:    Normal Values:  LA:     4.3    2.0 - 4.0 cm  Ao:     3.9    2.0 - 3.8 cm  SEPTUM: 1.0    0.6 - 1.2 cm  PWT:    0.9    0.6 - 1.1 cm  LVIDd:  6.2    3.0 - 5.6 cm  LVIDs:  5.7    1.8 - 4.0 cm  Derived variables:  LVMI: 146 g/m2  RWT: 0.29  Doppler Peak Velocity (m/sec): AoV=1.6  ------------------------------------------------------------------------  Observations:  Mitral Valve: Mitral annular calcification. Calcified and  tethered mitral valve leaflets with normal opening.  Moderate-severe mitral regurgitation.  Aortic Valve/Aorta: Aortic valve not well visualized;  appears to be a calcified trileaflet valve with normal  opening. Peak transaortic valve gradient equals 11 mm Hg,  mean transaortic valve gradient equals 5 mm Hg. Mild aortic  regurgitation. Peak left ventricular outflow tract gradient  equals 3 mm Hg.  Aortic Root: 3.9 cm.  Left Atrium: Mildly dilated left atrium.  LA volume index =  35 cc/m2.  Left Ventricle: Endocardial visualization enhanced with  intravenous injection of echo contrast (Definity). Left  ventricle is suboptimally visualized despite the use of  intravenous echo contrast; severe segmental left  ventricular systolic dysfunction.The mid to distal septum  and apical cap are aneurysmal and akinetic. No left  ventricular thrombus. Eccentric left ventricular  hypertrophy (dilated left ventricle with normal relative  wall thickness). Mild diastolic dysfunction (Stage I).  Right Heart: Normal right atrium. The right ventricle is  not well visualized; grossly normal right ventricular  systolic function. Tricuspid valve not well visualized,  probably normal. Minimal tricuspid regurgitation. Pulmonic  valve not well visualized, probably normal. Minimal  pulmonic regurgitation.  Pericardium/Pleura: Normal pericardium with no pericardial  effusion.  Hemodynamic: Estimated right atrial pressure is 8 mm Hg.  Estimated right ventricular systolic pressure equals 46 mm  Hg, assumingright atrial pressure equals 8 mm Hg,  consistent with mild pulmonary hypertension.  ------------------------------------------------------------------------  Conclusions:  Technically difficult study.  1. Mitral annular calcification. Calcified and tethered  mitral valve leaflets with normal opening. Moderate-severe  mitral regurgitation.  2. Aortic valve not well visualized; appears to be a  calcified trileaflet valve with normal opening. Mild aortic  regurgitation.  3. Mildly dilated left atrium.  LA volume index = 35 cc/m2.  4. Eccentric left ventricular hypertrophy (dilated left  ventricle with normal relative wall thickness).  5. Endocardial visualization enhanced with intravenous  injection of echo contrast (Definity). Left ventricle is  suboptimally visualized despite the use of intravenous echo  contrast; severe segmental left ventricular systolic  dysfunction. The mid to distal septum and apical cap are  aneurysmal and akinetic. No left ventricular thrombus.  6. Mild diastolic dysfunction (Stage I).  7. The right ventricle is not well visualized; grossly  normal right ventricular systolic function.  8. Estimated pulmonary artery systolic pressure equals 46  mm Hg, assuming right atrial pressure equals 8 mm Hg,  consistent with mild pulmonary pressures.  *** No previous Echo exam.  ------------------------------------------------------------------------  Confirmed on  7/15/2016 - 18:43:58 by Miriam Milligan M.D.  ------------------------------------------------------------------------    < end of copied text >

## 2023-05-22 NOTE — PROGRESS NOTE ADULT - ASSESSMENT
83-year-old gentleman PMH HTN, HLD, CM, CAD ( S/P 3 vessel CABG 2002), PCI to RCA 4/11/16, life vest x 3 months, former smoker ( 80 pack years),  AICD presented to the Dr. Tellez's office on 5/17/2032 with a report of a large abdominal aortic aneurysm. The patient's granddaughter stated that several weeks ago the patient was lying down and he felt a pulsatile mass in his abdomen. Patient went a duplex study which showed an 8.5 cm abdominal aortic aneurysm. Patient does not have complaints of abdominal or back pain. CTA outpatient on 5/19/2023 showed  infrarenal abdominal aortic aneurysm measuring 7.1 cm in diameter containing a large amount of mural thrombus. Patient presented to ER today for preop monitor and optimization.     Plan:  - Open AAA repair on Wednesday (5/24/2023)  - CTA done on 5/19/2023  - Cardiology consult - Dr. Theo Chung for risk stratification and optimization  - ACID interrogation before surgery  - Medicine consult for risk stratification and optimization  - regular diet, will preop on Tuesday    Vascular Surgery  [phone] 83-year-old gentleman PMH HTN, HLD, CM, CAD ( S/P 3 vessel CABG 2002), PCI to RCA 4/11/16, life vest x 3 months, former smoker ( 80 pack years),  AICD presented to the Dr. Tellez's office on 5/17/2032 with a report of a large abdominal aortic aneurysm. The patient's granddaughter stated that several weeks ago the patient was lying down and he felt a pulsatile mass in his abdomen. Patient went a duplex study which showed an 8.5 cm abdominal aortic aneurysm. Patient does not have complaints of abdominal or back pain. CTA outpatient on 5/19/2023 showed  infrarenal abdominal aortic aneurysm measuring 7.1 cm in diameter containing a large amount of mural thrombus. Patient presented to ER today for preop monitor and optimization.     Plan:  - EVAR on Wednesday (5/24/2023)  - CTA done on 5/19/2023  - Cardiology consult - Dr. Theo Chung for risk stratification and optimization  - ACID interrogation before surgery  - Medicine consult for risk stratification and optimization  - regular diet, will preop on Tuesday    Vascular Surgery  [phone]

## 2023-05-22 NOTE — PROGRESS NOTE ADULT - SUBJECTIVE AND OBJECTIVE BOX
TE[facility] Daily Progress Note  =====================================================    SUBJECTIVE: Patient seen and examined at bedside on AM rounds. Patient reports that they're feeling OK. Wanting to speak with Dr. Tellez about open vs endovascular repair    --------------------------------------------------------------------------------------  OBJECTIVE:    VITAL SIGNS:  Vital Signs Last 24 Hrs  T(C): 36.4 (22 May 2023 05:07), Max: 36.7 (21 May 2023 21:02)  T(F): 97.5 (22 May 2023 05:07), Max: 98 (21 May 2023 21:02)  HR: 76 (22 May 2023 05:07) (61 - 77)  BP: 104/62 (22 May 2023 05:07) (101/61 - 118/76)  BP(mean): --  RR: 18 (22 May 2023 05:07) (18 - 18)  SpO2: 96% (22 May 2023 05:07) (96% - 99%)    Parameters below as of 22 May 2023 05:07  Patient On (Oxygen Delivery Method): room air      --------------------------------------------------------------------------------------    EXAM:  General: NAD, Lying in bed comfortably  Neuro: A+Ox3  Resp: Good effort  GI/Abd: Soft, NT/ND, no rebound/guarding,  palpable pulsatile mass in mid abd.   Vascular: All 4 extremities warm. b/l palpable fem/pop/dp, no wounds    --------------------------------------------------------------------------------------    LABS:                        11.2   4.83  )-----------( 172      ( 21 May 2023 21:18 )             35.0     05-21    138  |  104  |  18  ----------------------------<  93  4.4   |  22  |  0.96    Ca    8.8      21 May 2023 21:18  Mg     1.9     05-21    TPro  7.3  /  Alb  3.8  /  TBili  0.5  /  DBili  x   /  AST  35  /  ALT  25  /  AlkPhos  82  05-21    PT/INR - ( 21 May 2023 21:18 )   PT: 16.7 sec;   INR: 1.43 ratio         PTT - ( 21 May 2023 21:18 )  PTT:39.4 sec      --------------------------------------------------------------------------------------    INS AND OUTS:    05-21-23 @ 07:01  -  05-22-23 @ 07:00  --------------------------------------------------------  IN: 200 mL / OUT: 750 mL / NET: -550 mL        --------------------------------------------------------------------------------------    MEDICATIONS:  MEDICATIONS  (STANDING):  aspirin  chewable 81 milliGRAM(s) Oral daily  atorvastatin 80 milliGRAM(s) Oral at bedtime  carvedilol 3.125 milliGRAM(s) Oral every 12 hours  furosemide    Tablet 20 milliGRAM(s) Oral daily  heparin   Injectable 5000 Unit(s) SubCutaneous every 8 hours  isosorbide   mononitrate ER Tablet (IMDUR) 30 milliGRAM(s) Oral daily  lisinopril 10 milliGRAM(s) Oral daily  pantoprazole    Tablet 40 milliGRAM(s) Oral before breakfast    MEDICATIONS  (PRN):    --------------------------------------------------------------------------------------

## 2023-05-22 NOTE — PATIENT PROFILE ADULT - FALL HARM RISK - HARM RISK INTERVENTIONS
Assistance with ambulation/Communicate Risk of Fall with Harm to all staff/Monitor for mental status changes/Monitor gait and stability/Reinforce activity limits and safety measures with patient and family/Reorient to person, place and time as needed/Review medications for side effects contributing to fall risk/Sit up slowly, dangle for a short time, stand at bedside before walking/Tailored Fall Risk Interventions/Use of alarms - bed, chair and/or voice tab/Visual Cue: Yellow wristband and red socks/Bed in lowest position, wheels locked, appropriate side rails in place/Call bell, personal items and telephone in reach/Instruct patient to call for assistance before getting out of bed or chair/Non-slip footwear when patient is out of bed/[place] to call system/Physically safe environment - no spills, clutter or unnecessary equipment/Purposeful Proactive Rounding/Room/bathroom lighting operational, light cord in reach

## 2023-05-22 NOTE — CONSULT NOTE ADULT - PROBLEM SELECTOR RECOMMENDATION 9
planning for [facility] on Wednesday (5/24/2023)  Vascular surgery follow up   Intermediate to high risk to proceed.  RCRI 3 with 30 day 15% risk of death, MI or cardiac arrest  Check TTE

## 2023-05-22 NOTE — CONSULT NOTE ADULT - SUBJECTIVE AND OBJECTIVE BOX
CHIEF COMPLAINT:  Follows with a cardiologist sabrina camarena. Last SPECT was 2 years ago and was told unchanged.   HISTORY OF PRESENT ILLNESS:  83-year-old gentleman PMH HTN, HLD, CM, CAD ( S/P 3 vessel CABG 2002), PCI to RCA 4/11/16, life vest x 3 months, former smoker ( 80 pack years),  AICD presented to the Dr. Tellez's office on 5/17/2032 with a report of a large abdominal aortic aneurysm. The patient's granddaughter stated that several weeks ago the patient was lying down and he felt a pulsatile mass in his abdomen. Patient went a duplex study which showed an 8.5 cm abdominal aortic aneurysm. Patient does not have complaints of abdominal or back pain. CTA outpatient on 5/19/2023 showed  infrarenal abdominal aortic aneurysm measuring 7.1 cm in diameter containing a large amount of mural thrombus. Patient presented to ER today for preop monitor and optimization.     In ER, patient is HDS, no complains, palpable pulsatile mass in mid abd.     Denies exertional chest pain, shortness of breath.         S/P CABG x 3  s/p NAWAF procedure (LV endoaneurysmorraphy  Stented coronary artery  PCI RCA 4/2016.    PAST MEDICAL & SURGICAL HISTORY:  Hypertension      HLD (hyperlipidemia)      Systolic dysfunction      BEE (dyspnea on exertion)      Former cigarette smoker      S/P CABG x 3  s/p NAWAF procedure (LV endoaneurysmorraphy      Stented coronary artery  PCI RCA 4/2016      AICD (automatic cardioverter/defibrillator) present              MEDICATIONS:  aspirin  chewable 81 milliGRAM(s) Oral daily  carvedilol 3.125 milliGRAM(s) Oral every 12 hours  furosemide    Tablet 20 milliGRAM(s) Oral daily  heparin   Injectable 5000 Unit(s) SubCutaneous every 8 hours  isosorbide   mononitrate ER Tablet (IMDUR) 30 milliGRAM(s) Oral daily  lisinopril 10 milliGRAM(s) Oral daily          pantoprazole    Tablet 40 milliGRAM(s) Oral before breakfast    atorvastatin 80 milliGRAM(s) Oral at bedtime        FAMILY HISTORY:  Diabetes mellitus (Sibling)        SOCIAL HISTORY:    [ ] Non-smoker  [ ] Smoker  [ ] Alcohol    Allergies    No Known Allergies    Intolerances    	    REVIEW OF SYSTEMS:  CONSTITUTIONAL: No fever, weight loss, or fatigue  EYES: No eye pain, visual disturbances, or discharge  ENMT:  No difficulty hearing, tinnitus, vertigo; No sinus or throat pain  NECK: No pain or stiffness  RESPIRATORY: No cough, wheezing, chills or hemoptysis; No Shortness of Breath  CARDIOVASCULAR: No chest pain, palpitations, passing out, dizziness, or leg swelling  GASTROINTESTINAL: No abdominal or epigastric pain. No nausea, vomiting, or hematemesis; No diarrhea or constipation. No melena or hematochezia.  GENITOURINARY: No dysuria, frequency, hematuria, or incontinence  NEUROLOGICAL: No headaches, memory loss, loss of strength, numbness, or tremors  SKIN: No itching, burning, rashes, or lesions   LYMPH Nodes: No enlarged glands  ENDOCRINE: No heat or cold intolerance; No hair loss  MUSCULOSKELETAL: No joint pain or swelling; No muscle, back, or extremity pain  PSYCHIATRIC: No depression, anxiety, mood swings, or difficulty sleeping  HEME/LYMPH: No easy bruising, or bleeding gums  ALLERY AND IMMUNOLOGIC: No hives or eczema	    [ ] All others negative	  [ ] Unable to obtain    PHYSICAL EXAM:  T(C): 36.2 (05-22-23 @ 09:10), Max: 36.7 (05-21-23 @ 21:02)  HR: 61 (05-22-23 @ 09:10) (61 - 77)  BP: 96/60 (05-22-23 @ 09:10) (96/60 - 118/76)  RR: 18 (05-22-23 @ 09:10) (18 - 18)  SpO2: 96% (05-22-23 @ 09:10) (96% - 99%)  Wt(kg): --  I&O's Summary    21 May 2023 07:01  -  22 May 2023 07:00  --------------------------------------------------------  IN: 350 mL / OUT: 750 mL / NET: -400 mL    22 May 2023 07:01  -  22 May 2023 09:15  --------------------------------------------------------  IN: 240 mL / OUT: 400 mL / NET: -160 mL        Appearance: Normal	  HEENT:   Normal oral mucosa, PERRL, EOMI	  Lymphatic: No lymphadenopathy  Cardiovascular: Normal S1 S2, No JVD, No murmurs, No edema  Respiratory: Lungs clear to auscultation	  Psychiatry: A & O x 3, Mood & affect appropriate  Gastrointestinal:  Soft, Non-tender, + BS	+ pulsatile mass  Skin: No rashes, No ecchymoses, No cyanosis	  Neurologic: Non-focal  Extremities: Normal range of motion, No clubbing, cyanosis or edema  Vascular: Peripheral pulses palpable 2+ bilaterally    TELEMETRY: 	  AV paced   ECG:  	AV paced   RADIOLOGY:  < from: CT Angio Abdomen and Pelvis w/ IV Cont (05.19.23 @ 15:12) >    ACC: [id number] EXAM:  CT ANGIO ABD PELV (W)AW IC   ORDERED BY: PURNIMA MENDIOLA     PROCEDURE DATE:  05/19/2023          INTERPRETATION:  CLINICAL INFORMATION: Abdominal aortic aneurysm    COMPARISON: None.    CONTRAST/COMPLICATIONS:  IV Contrast: Omnipaque 350  110 cc administered   90 cc discarded  Oral Contrast: NONE  Complications: None reported at time of study completion    PROCEDURE:  CT Angiography of the Abdomen, pelvis and lower extremities.  Arterial phase images were acquired.  Sagittal and coronal reformats were performed as well as 3D (MIP)   reconstructions.    FINDINGS:  LOWER CHEST: 8 mm spiculated right lower lobe pulmonary nodule. A few   additional tiny nodules in both lungs, for example a 2 mm right lower   lobe pulmonary nodule (5; 62). Subpleural reticular opacities compatible   with interstitial lung disease. ICD leads noted. Status post sternotomy.   Cardiomegaly. Native coronary artery calcifications. Status post wedge   resection in the lingula.    LIVER: Within normal limits.  BILE DUCTS: Normal caliber.  GALLBLADDER: Within normal limits.  SPLEEN: Within normal limits.  PANCREAS: Within normal limits.  ADRENALS: Within normal limits.  KIDNEYS/URETERS: Bilateral renal cysts.    BLADDER: Within normal limits.  REPRODUCTIVE ORGANS: Prostate is moderately enlarged.    BOWEL: No bowel obstruction. Appendix is normal.  PERITONEUM: No ascites.  VESSELS: Infrarenal abdominal aortic aneurysm measuring 7.1 cm in   diameter containing a large amount of mural thrombus. There is a neck of   2 cm between the left renal artery origin and the takeoff of the   aneurysm. The aneurysm does not involve the iliac bifurcation or common   iliac arteries. The bilateral common, external and internal iliac   arteries demonstrate no significant stenosis. The common femoral,   profunda femoral and superficial femoral arteries are normal in caliber   and demonstrate no significant narrowing although note that the distal   femoral and popliteal arteries are not well visualized due to poor   contrast opacification.  RETROPERITONEUM/LYMPH NODES: No lymphadenopathy.  ABDOMINAL WALL: Within normal limits.  BONES: Within normal limits.    IMPRESSION:  7.1 cm infrarenal abdominal aortic aneurysm as detailed above.  8mm spiculated nodule in the right lower lobe worrisome for small primary   lung cancer.    Findings were discussed with Dr. TELLEZ 5/19/2023 3:58 PM by Dr. Link   with read back confirmation.    --- End of Report ---            LEE LINK MD; Attending Radiologist  This document has been electronically signed. May 19 2023  3:58PM    < end of copied text >  < from: Transthoracic Echocardiogram (07.15.16 @ 09:27) >    Patient name: CATHY MARTINES  YOB: 1940   Age: 76 (M)   MR#: [id number]  Study Date: 7/15/2016  Location: O/PSonographer: Frances Cedeño RDCS  Study quality: Technically difficult  Referring Physician: Rosa Reyes MD  Blood Pressure: 135/68 mmHg  Height: 168 cm  Weight: 59 kg  BSA: 1.7 m2  ------------------------------------------------------------------------  PROCEDURE: Transthoracic echocardiogram with 2-D, M-Mode  and complete spectral and color flow Doppler. Intravenous  catheter inserted. Verbal consent was obtained for  injection of echo contrast following a discussion of risks  and benefits. Following intravenous injection of contrast,  harmonic imaging was performed.  INDICATION: Cardiomyopathy, unspecified (I42.9)  ------------------------------------------------------------------------  Dimensions:    Normal Values:  LA:     4.3    2.0 - 4.0 cm  Ao:     3.9    2.0 - 3.8 cm  SEPTUM: 1.0    0.6 - 1.2 cm  PWT:    0.9    0.6 - 1.1 cm  LVIDd:  6.2    3.0 - 5.6 cm  LVIDs:  5.7    1.8 - 4.0 cm  Derived variables:  LVMI: 146 g/m2  RWT: 0.29  Doppler Peak Velocity (m/sec): AoV=1.6  ------------------------------------------------------------------------  Observations:  Mitral Valve: Mitral annular calcification. Calcified and  tethered mitral valve leaflets with normal opening.  Moderate-severe mitral regurgitation.  Aortic Valve/Aorta: Aortic valve not well visualized;  appears to be a calcified trileaflet valve with normal  opening. Peak transaortic valve gradient equals 11 mm Hg,  mean transaortic valve gradient equals 5 mm Hg. Mild aortic  regurgitation. Peak left ventricular outflow tract gradient  equals 3 mm Hg.  Aortic Root: 3.9 cm.  Left Atrium: Mildly dilated left atrium.  LA volume index =  35 cc/m2.  Left Ventricle: Endocardial visualization enhanced with  intravenous injection of echo contrast (Definity). Left  ventricle is suboptimally visualized despite the use of  intravenous echo contrast; severe segmental left  ventricular systolic dysfunction.The mid to distal septum  and apical cap are aneurysmal and akinetic. No left  ventricular thrombus. Eccentric left ventricular  hypertrophy (dilated left ventricle with normal relative  wall thickness). Mild diastolic dysfunction (Stage I).  Right Heart: Normal right atrium. The right ventricle is  not well visualized; grossly normal right ventricular  systolic function. Tricuspid valve not well visualized,  probably normal. Minimal tricuspid regurgitation. Pulmonic  valve not well visualized, probably normal. Minimal  pulmonic regurgitation.  Pericardium/Pleura: Normal pericardium with no pericardial  effusion.  Hemodynamic: Estimated right atrial pressure is 8 mm Hg.  Estimated right ventricular systolic pressure equals 46 mm  Hg, assumingright atrial pressure equals 8 mm Hg,  consistent with mild pulmonary hypertension.  ------------------------------------------------------------------------  Conclusions:  Technically difficult study.  1. Mitral annular calcification. Calcified and tethered  mitral valve leaflets with normal opening. Moderate-severe  mitral regurgitation.  2. Aortic valve not well visualized; appears to be a  calcified trileaflet valve with normal opening. Mild aortic  regurgitation.  3. Mildly dilated left atrium.  LA volume index = 35 cc/m2.  4. Eccentric left ventricular hypertrophy (dilated left  ventricle with normal relative wall thickness).  5. Endocardial visualization enhanced with intravenous  injection of echo contrast (Definity). Left ventricle is  suboptimally visualized despite the use of intravenous echo  contrast; severe segmental left ventricular systolic  dysfunction. The mid to distal septum and apical cap are  aneurysmal and akinetic. No left ventricular thrombus.  6. Mild diastolic dysfunction (Stage I).  7. The right ventricle is not well visualized; grossly  normal right ventricular systolic function.  8. Estimated pulmonary artery systolic pressure equals 46  mm Hg, assuming right atrial pressure equals 8 mm Hg,  consistent with mild pulmonary pressures.  *** No previous Echo exam.  ------------------------------------------------------------------------    < end of copied text >    OTHER: 	  	  LABS:	 	    CARDIAC MARKERS:                                  12.1   5.40  )-----------( 180      ( 22 May 2023 07:12 )             37.5     05-22    139  |  104  |  16  ----------------------------<  78  4.0   |  26  |  0.94    Ca    8.9      22 May 2023 07:10  Phos  3.7     05-22  Mg     2.0     05-22    TPro  7.3  /  Alb  3.8  /  TBili  0.5  /  DBili  x   /  AST  35  /  ALT  25  /  AlkPhos  82  05-21    proBNP:   Lipid Profile:   HgA1c:   TSH:

## 2023-05-23 DIAGNOSIS — R91.1 SOLITARY PULMONARY NODULE: ICD-10-CM

## 2023-05-23 DIAGNOSIS — Z87.891 PERSONAL HISTORY OF NICOTINE DEPENDENCE: ICD-10-CM

## 2023-05-23 LAB
ANION GAP SERPL CALC-SCNC: 11 MMOL/L — SIGNIFICANT CHANGE UP (ref 5–17)
BUN SERPL-MCNC: 13 MG/DL — SIGNIFICANT CHANGE UP (ref 7–23)
CALCIUM SERPL-MCNC: 9.2 MG/DL — SIGNIFICANT CHANGE UP (ref 8.4–10.5)
CHLORIDE SERPL-SCNC: 103 MMOL/L — SIGNIFICANT CHANGE UP (ref 96–108)
CO2 SERPL-SCNC: 25 MMOL/L — SIGNIFICANT CHANGE UP (ref 22–31)
CREAT SERPL-MCNC: 0.97 MG/DL — SIGNIFICANT CHANGE UP (ref 0.5–1.3)
EGFR: 77 ML/MIN/1.73M2 — SIGNIFICANT CHANGE UP
GLUCOSE SERPL-MCNC: 87 MG/DL — SIGNIFICANT CHANGE UP (ref 70–99)
HCT VFR BLD CALC: 37.3 % — LOW (ref 39–50)
HGB BLD-MCNC: 12 G/DL — LOW (ref 13–17)
MAGNESIUM SERPL-MCNC: 1.9 MG/DL — SIGNIFICANT CHANGE UP (ref 1.6–2.6)
MCHC RBC-ENTMCNC: 29.2 PG — SIGNIFICANT CHANGE UP (ref 27–34)
MCHC RBC-ENTMCNC: 32.2 GM/DL — SIGNIFICANT CHANGE UP (ref 32–36)
MCV RBC AUTO: 90.8 FL — SIGNIFICANT CHANGE UP (ref 80–100)
NRBC # BLD: 0 /100 WBCS — SIGNIFICANT CHANGE UP (ref 0–0)
PHOSPHATE SERPL-MCNC: 3.3 MG/DL — SIGNIFICANT CHANGE UP (ref 2.5–4.5)
PLATELET # BLD AUTO: 167 K/UL — SIGNIFICANT CHANGE UP (ref 150–400)
POTASSIUM SERPL-MCNC: 4.2 MMOL/L — SIGNIFICANT CHANGE UP (ref 3.5–5.3)
POTASSIUM SERPL-SCNC: 4.2 MMOL/L — SIGNIFICANT CHANGE UP (ref 3.5–5.3)
RBC # BLD: 4.11 M/UL — LOW (ref 4.2–5.8)
RBC # FLD: 14.2 % — SIGNIFICANT CHANGE UP (ref 10.3–14.5)
SODIUM SERPL-SCNC: 139 MMOL/L — SIGNIFICANT CHANGE UP (ref 135–145)
WBC # BLD: 5.53 K/UL — SIGNIFICANT CHANGE UP (ref 3.8–10.5)
WBC # FLD AUTO: 5.53 K/UL — SIGNIFICANT CHANGE UP (ref 3.8–10.5)

## 2023-05-23 PROCEDURE — 99232 SBSQ HOSP IP/OBS MODERATE 35: CPT

## 2023-05-23 RX ORDER — IPRATROPIUM/ALBUTEROL SULFATE 18-103MCG
3 AEROSOL WITH ADAPTER (GRAM) INHALATION EVERY 6 HOURS
Refills: 0 | Status: DISCONTINUED | OUTPATIENT
Start: 2023-05-23 | End: 2023-05-25

## 2023-05-23 RX ADMIN — Medication 20 MILLIGRAM(S): at 05:24

## 2023-05-23 RX ADMIN — HEPARIN SODIUM 5000 UNIT(S): 5000 INJECTION INTRAVENOUS; SUBCUTANEOUS at 21:48

## 2023-05-23 RX ADMIN — Medication 81 MILLIGRAM(S): at 15:32

## 2023-05-23 RX ADMIN — PANTOPRAZOLE SODIUM 40 MILLIGRAM(S): 20 TABLET, DELAYED RELEASE ORAL at 05:24

## 2023-05-23 RX ADMIN — Medication 3 MILLILITER(S): at 18:04

## 2023-05-23 RX ADMIN — HEPARIN SODIUM 5000 UNIT(S): 5000 INJECTION INTRAVENOUS; SUBCUTANEOUS at 05:24

## 2023-05-23 RX ADMIN — HEPARIN SODIUM 5000 UNIT(S): 5000 INJECTION INTRAVENOUS; SUBCUTANEOUS at 15:32

## 2023-05-23 RX ADMIN — ATORVASTATIN CALCIUM 80 MILLIGRAM(S): 80 TABLET, FILM COATED ORAL at 21:47

## 2023-05-23 RX ADMIN — LISINOPRIL 10 MILLIGRAM(S): 2.5 TABLET ORAL at 05:24

## 2023-05-23 NOTE — CONSULT NOTE ADULT - PROBLEM SELECTOR RECOMMENDATION 3
TTE 5/22/23 with severe LV systolic dysfunction with apex and septum akinesis. LVEF 20-25%. Mild LV diastolic dysfunction. Mild to moderate MR, mild to moderate AI  -Per cards.
Check TTE

## 2023-05-23 NOTE — CONSULT NOTE ADULT - PROBLEM SELECTOR RECOMMENDATION 4
Former 2-3 ppd smoker x at least 40 years  -F/u CT chest  -Suggest eventual outpatient PFTs  -Normoxic, no complaints of dyspnea  -Check VBG in AM. Former 2-3 ppd smoker x at least 40 years  -F/u CT chest  -Suggest eventual outpatient PFTs  -Normoxic, no complaints of dyspnea  -Check VBG in AM  -Can consider Duoneb q6h PRN for SOB/wheezing.

## 2023-05-23 NOTE — PROGRESS NOTE ADULT - SUBJECTIVE AND OBJECTIVE BOX
Name of Patient : CATHY MARTINES  MRN: [id number]  Date of visit: 05-23-23 @ 13:53      Subjective: Patient seen and examined. No new events except as noted.   Patient seen earlier this AM. Lying down in bed. Offers no new complaints.  Denies chest pain, palpitations, SOB or dyspnea.   Family at the bedside.     REVIEW OF SYSTEMS:    CONSTITUTIONAL: Generalized weakness   EYES/ENT: No visual changes;  No vertigo or throat pain   NECK: No pain or stiffness  RESPIRATORY: No cough, wheezing, hemoptysis; No shortness of breath  CARDIOVASCULAR: No chest pain or palpitations  GASTROINTESTINAL: No abdominal or epigastric pain. No nausea, vomiting, or hematemesis; No diarrhea or constipation. No melena or hematochezia.  GENITOURINARY: No dysuria, frequency or hematuria  NEUROLOGICAL: No numbness or weakness  SKIN: No itching, burning, rashes, or lesions   All other review of systems is negative unless indicated above.    MEDICATIONS:  MEDICATIONS  (STANDING):  aspirin  chewable 81 milliGRAM(s) Oral daily  atorvastatin 80 milliGRAM(s) Oral at bedtime  carvedilol 3.125 milliGRAM(s) Oral every 12 hours  furosemide    Tablet 20 milliGRAM(s) Oral daily  heparin   Injectable 5000 Unit(s) SubCutaneous every 8 hours  isosorbide   mononitrate ER Tablet (IMDUR) 30 milliGRAM(s) Oral daily  lisinopril 10 milliGRAM(s) Oral daily  pantoprazole    Tablet 40 milliGRAM(s) Oral before breakfast      PHYSICAL EXAM:  T(C): 36.5 (05-23-23 @ 10:13), Max: 36.9 (05-23-23 @ 01:15)  HR: 60 (05-23-23 @ 12:50) (60 - 67)  BP: 108/72 (05-23-23 @ 12:50) (96/57 - 117/79)  RR: 16 (05-23-23 @ 10:13) (16 - 18)  SpO2: 97% (05-23-23 @ 10:13) (97% - 98%)  Wt(kg): --  I&O's Summary    22 May 2023 07:01  -  23 May 2023 07:00  --------------------------------------------------------  IN: 1060 mL / OUT: 2225 mL / NET: -1165 mL    23 May 2023 07:01  -  23 May 2023 13:53  --------------------------------------------------------  IN: 280 mL / OUT: 500 mL / NET: -220 mL          Appearance: Normal	  HEENT: Eyes are open; Glasses   Lymphatic: No lymphadenopathy   Cardiovascular: Normal    Respiratory: normal effort , clear  Gastrointestinal:  Soft, Non-tender  Skin: No rashes,  warm to touch  Psychiatry:  Mood & affect appropriate  Musculoskeletal: No edema      05-22-23 @ 07:01  -  05-23-23 @ 07:00  --------------------------------------------------------  IN: 1060 mL / OUT: 2225 mL / NET: -1165 mL    05-23-23 @ 07:01  -  05-23-23 @ 13:53  --------------------------------------------------------  IN: 280 mL / OUT: 500 mL / NET: -220 mL                                  12.0   5.53  )-----------( 167      ( 23 May 2023 07:12 )             37.3               05-23    139  |  103  |  13  ----------------------------<  87  4.2   |  25  |  0.97    Ca    9.2      23 May 2023 07:12  Phos  3.3     05-23  Mg     1.9     05-23    TPro  7.3  /  Alb  3.8  /  TBili  0.5  /  DBili  x   /  AST  35  /  ALT  25  /  AlkPhos  82  05-21    PT/INR - ( 21 May 2023 21:18 )   PT: 16.7 sec;   INR: 1.43 ratio         PTT - ( 21 May 2023 21:18 )  PTT:39.4 sec                     < from: TTE Limited W or WO Ultrasound Enhancing Agent (05.22.23 @ 12:34) >  CONCLUSIONS:      1. Technically difficult image quality.   2. Left ventricle suboptimally visualized despite intravenous ultrasonic enhancing agent. Severe left ventricular systolic dysfunction. The septum and the apex are akinetic. Estimated LV ejection fraction approximately 20-25% by visual estimation. No left ventricular thrombus seen.   3. Severely dilated left ventricular cavity size. Abnormal septal motion consistent with right ventricular pacemaker.   4. There is mild (grade 1) left ventricular diastolic dysfunction.   5. The right ventricle is not well visualized. grossly normal right ventricular cavity size.   6. Device lead is visualized in the right heart.   7. Symmetric mitral valve leaflet tethering.   8. Mild to moderate mitral regurgitation.   9. Aortic valve was not well visualized.  10. Trileaflet aortic valve.  11. Mild-to-moderate aortic regurgitation.  12. Compared to the transthoracic echocardiogram performed on 7/15/2016 results are similar on today's study.    < end of copied text >

## 2023-05-23 NOTE — PROGRESS NOTE ADULT - ASSESSMENT
83-year-old gentleman PMH HTN, HLD, CM, CAD ( S/P 3 vessel CABG 2002), PCI to RCA 4/11/16, life vest x 3 months, former smoker ( 80 pack years),  AICD presented to the Dr. Tellez's office on 5/17/2032 with a report of a large abdominal aortic aneurysm. The patient's granddaughter stated that several weeks ago the patient was lying down and he felt a pulsatile mass in his abdomen. Patient went a duplex study which showed an 8.5 cm abdominal aortic aneurysm. Patient does not have complaints of abdominal or back pain. CTA outpatient on 5/19/2023 showed  infrarenal abdominal aortic aneurysm measuring 7.1 cm in diameter containing a large amount of mural thrombus. Patient presented to ER today for preop monitor and optimization.     Plan:  - Regular diet  - ASA, SQH  - Cardiology consult appreciated- will need AICD interrogation prior to OR  - TTE: Severe left ventricular systolic dysfunction with estimated LV EF ~20-25%  - Medicine consult appreciated  - Pulm consult for RLL nodule seen on CT  - OR planned for Thursday AM 5/25/23  - Will need med/cardio clearance documented prior to OR, will preop Wed 5/24  - AM labs    Vascular Surgery  [phone]

## 2023-05-23 NOTE — CONSULT NOTE ADULT - SUBJECTIVE AND OBJECTIVE BOX
PULMONARY CONSULT    HPI: 82 y/o M with PMH of CAD (s/p PCI 2016, CABG 2002), CM with life vest x 3 months & AICD, HTN, HLD, former smoker, AAA (saw Dr. Tellez 5/17 in the office, CT A/P 5/19 with 7.1 cm infrarenal abdominal aortic aneurysm). Presents to the ED for preop monitoring and optimization prior to repair of AAA. Pulmonary called to consult for lung nodules seen on CT A/P.     PAST MEDICAL & SURGICAL HISTORY:  Hypertension  HLD (hyperlipidemia)  Systolic dysfunction  BEE (dyspnea on exertion)  Former cigarette smoker  S/P CABG x 3  s/p NAWAF procedure (LV endoaneurysmorraphy  Stented coronary artery PCI RCA 4/2016  AICD (automatic cardioverter/defibrillator) present    No Known Allergies    FAMILY HISTORY:  Diabetes mellitus (Sibling)    Social history:     Review of Systems:  CONSTITUTIONAL: No fever, chills, or fatigue  EYES: No eye pain, visual disturbances, or discharge  ENMT:  No difficulty hearing, tinnitus, vertigo; No sinus or throat pain  NECK: No pain or stiffness  RESPIRATORY: Per above  CARDIOVASCULAR: No chest pain, palpitations, dizziness, or leg swelling  GASTROINTESTINAL: No abdominal or epigastric pain. No nausea, vomiting, or hematemesis; No diarrhea or constipation. No melena or hematochezia.  GENITOURINARY: No dysuria, frequency, hematuria, or incontinence  NEUROLOGICAL: No headaches, memory loss, loss of strength, numbness, or tremors  SKIN: No itching, burning, rashes, or lesions   MUSCULOSKELETAL: No joint pain or swelling; No muscle, back, or extremity pain  PSYCHIATRIC: No depression, anxiety, mood swings, or difficulty sleeping    Medications:  MEDICATIONS  (STANDING):  aspirin  chewable 81 milliGRAM(s) Oral daily  atorvastatin 80 milliGRAM(s) Oral at bedtime  carvedilol 3.125 milliGRAM(s) Oral every 12 hours  furosemide    Tablet 20 milliGRAM(s) Oral daily  heparin   Injectable 5000 Unit(s) SubCutaneous every 8 hours  isosorbide   mononitrate ER Tablet (IMDUR) 30 milliGRAM(s) Oral daily  lisinopril 10 milliGRAM(s) Oral daily  pantoprazole    Tablet 40 milliGRAM(s) Oral before breakfast    Vital Signs Last 24 Hrs  T(C): 36.6 (23 May 2023 05:16), Max: 36.9 (23 May 2023 01:15)  T(F): 97.9 (23 May 2023 05:16), Max: 98.4 (23 May 2023 01:15)  HR: 67 (23 May 2023 05:16) (60 - 67)  BP: 108/72 (23 May 2023 05:16) (104/57 - 117/79)  BP(mean): --  RR: 18 (23 May 2023 05:16) (18 - 18)  SpO2: 98% (23 May 2023 05:16) (97% - 98%)    Parameters below as of 23 May 2023 05:16  Patient On (Oxygen Delivery Method): room air    05-22 @ 07:01  -  05-23 @ 07:00  --------------------------------------------------------  IN: 1060 mL / OUT: 2225 mL / NET: -1165 mL    LABS:                        12.0   5.53  )-----------( 167      ( 23 May 2023 07:12 )             37.3     05-23    139  |  103  |  13  ----------------------------<  87  4.2   |  25  |  0.97    Ca    9.2      23 May 2023 07:12  Phos  3.3     05-23  Mg     1.9     05-23    TPro  7.3  /  Alb  3.8  /  TBili  0.5  /  DBili  x   /  AST  35  /  ALT  25  /  AlkPhos  82  05-21      PT/INR - ( 21 May 2023 21:18 )   PT: 16.7 sec;   INR: 1.43 ratio         PTT - ( 21 May 2023 21:18 )  PTT:39.4 sec    Physical Examination:    General: No acute distress.      HEENT: Pupils equal, reactive to light.  Symmetric.    PULM: Clear to auscultation bilaterally, no significant sputum production    CVS: S1, S2    ABD: Soft, nondistended, nontender, normoactive bowel sounds, no masses    EXT: No edema, nontender    SKIN: Warm and well perfused, no rashes noted.    NEURO: Alert, oriented, interactive, nonfocal    RADIOLOGY REVIEWED    < from: CT Angio Abdomen and Pelvis w/ IV Cont (05.19.23 @ 15:12) >  FINDINGS:  LOWER CHEST: 8 mm spiculated right lower lobe pulmonary nodule. A few   additional tiny nodules in both lungs, for example a 2 mm right lower   lobe pulmonary nodule (5; 62). Subpleural reticular opacities compatible   with interstitial lung disease. ICD leads noted. Status post sternotomy.   Cardiomegaly. Native coronary artery calcifications. Status post wedge   resection in the lingula.    LIVER: Within normal limits.  BILE DUCTS: Normal caliber.  GALLBLADDER: Within normal limits.  SPLEEN: Within normal limits.  PANCREAS: Within normal limits.  ADRENALS: Within normal limits.  KIDNEYS/URETERS: Bilateral renal cysts.    BLADDER: Within normal limits.  REPRODUCTIVE ORGANS: Prostate is moderately enlarged.    BOWEL: No bowel obstruction. Appendix is normal.  PERITONEUM: No ascites.  VESSELS: Infrarenal abdominal aortic aneurysm measuring 7.1 cm in   diameter containing a large amount of mural thrombus. There is a neck of   2 cm between the left renal artery origin and the takeoff of the   aneurysm. The aneurysm does not involve the iliac bifurcation or common   iliac arteries. The bilateral common, external and internal iliac   arteries demonstrate no significant stenosis. The common femoral,   profunda femoral and superficial femoral arteries are normal in caliber   and demonstrate no significant narrowing although note that the distal   femoral and popliteal arteries are not well visualized due to poor   contrast opacification.  RETROPERITONEUM/LYMPH NODES: No lymphadenopathy.  ABDOMINAL WALL: Within normal limits.  BONES: Within normal limits.    IMPRESSION:  7.1 cm infrarenal abdominal aortic aneurysm as detailed above.  8mm spiculated nodule in the right lower lobe worrisome for small primary   lung cancer.    Findings were discussed with Dr. TELLEZ 5/19/2023 3:58 PM by Dr. Likn   with read back confirmation.    --- End of Report ---      < end of copied text >       PULMONARY CONSULT    HPI: 82 y/o M with PMH of CAD (s/p PCI 2016, CABG 2002), CM with life vest x 3 months & AICD, HTN, HLD, former smoker, AAA (saw Dr. Tellez 5/17 in the office, CT A/P 5/19 with 7.1 cm infrarenal abdominal aortic aneurysm). Presents to the ED for preop monitoring and optimization prior to repair of AAA. Pulmonary called to consult for lung nodules seen on CT A/P. Former smoker (2-3pdd for at least 40 years, quit in 2002). Denies hx of diagnosed lung disease, inhaler use. Denies SOB/CP, pleuritic chest pain, fever, chills, abdominal pain. O2 sats 99% on RA.     PAST MEDICAL & SURGICAL HISTORY:  Hypertension  HLD (hyperlipidemia)  Systolic dysfunction  Former cigarette smoker  S/P CABG x 3  s/p NAWAF procedure (LV endoaneurysmorraphy  Stented coronary artery PCI RCA 4/2016  AICD (automatic cardioverter/defibrillator) present    No Known Allergies    FAMILY HISTORY:  Diabetes mellitus (Sibling)    Social history: Former smoker (2-3pdd for at least 40 years, quit in 2002)    Review of Systems:  CONSTITUTIONAL: No fever, chills, or fatigue  EYES: No eye pain, visual disturbances, or discharge  ENMT:  No difficulty hearing, tinnitus, vertigo; No sinus or throat pain  NECK: No pain or stiffness  RESPIRATORY: Per above  CARDIOVASCULAR: No chest pain, palpitations, dizziness, or leg swelling  GASTROINTESTINAL: Per above  GENITOURINARY: No dysuria, frequency, hematuria, or incontinence  NEUROLOGICAL: No headaches, memory loss, loss of strength, numbness, or tremors  SKIN: No itching, burning, rashes, or lesions   MUSCULOSKELETAL: No joint pain or swelling; No muscle, back, or extremity pain  PSYCHIATRIC: No depression, anxiety, mood swings, or difficulty sleeping    Medications:  MEDICATIONS  (STANDING):  aspirin  chewable 81 milliGRAM(s) Oral daily  atorvastatin 80 milliGRAM(s) Oral at bedtime  carvedilol 3.125 milliGRAM(s) Oral every 12 hours  furosemide    Tablet 20 milliGRAM(s) Oral daily  heparin   Injectable 5000 Unit(s) SubCutaneous every 8 hours  isosorbide   mononitrate ER Tablet (IMDUR) 30 milliGRAM(s) Oral daily  lisinopril 10 milliGRAM(s) Oral daily  pantoprazole    Tablet 40 milliGRAM(s) Oral before breakfast    Vital Signs Last 24 Hrs  T(C): 36.6 (23 May 2023 05:16), Max: 36.9 (23 May 2023 01:15)  T(F): 97.9 (23 May 2023 05:16), Max: 98.4 (23 May 2023 01:15)  HR: 67 (23 May 2023 05:16) (60 - 67)  BP: 108/72 (23 May 2023 05:16) (104/57 - 117/79)  BP(mean): --  RR: 18 (23 May 2023 05:16) (18 - 18)  SpO2: 98% (23 May 2023 05:16) (97% - 98%)    Parameters below as of 23 May 2023 05:16  Patient On (Oxygen Delivery Method): room air    05-22 @ 07:01  -  05-23 @ 07:00  --------------------------------------------------------  IN: 1060 mL / OUT: 2225 mL / NET: -1165 mL    LABS:                        12.0   5.53  )-----------( 167      ( 23 May 2023 07:12 )             37.3     05-23    139  |  103  |  13  ----------------------------<  87  4.2   |  25  |  0.97    Ca    9.2      23 May 2023 07:12  Phos  3.3     05-23  Mg     1.9     05-23    TPro  7.3  /  Alb  3.8  /  TBili  0.5  /  DBili  x   /  AST  35  /  ALT  25  /  AlkPhos  82  05-21      PT/INR - ( 21 May 2023 21:18 )   PT: 16.7 sec;   INR: 1.43 ratio         PTT - ( 21 May 2023 21:18 )  PTT:39.4 sec    Physical Examination:    General: No acute distress.      HEENT: Pupils equal, reactive to light.  Symmetric.    PULM: Clear to auscultation bilaterally    CVS: RRR    ABD: mid abdomen palpable pulsatile mass     EXT: No edema, nontender    SKIN: Warm and well perfused, no rashes noted.    NEURO: Alert, oriented, interactive, nonfocal    RADIOLOGY REVIEWED    < from: CT Angio Abdomen and Pelvis w/ IV Cont (05.19.23 @ 15:12) >  FINDINGS:  LOWER CHEST: 8 mm spiculated right lower lobe pulmonary nodule. A few   additional tiny nodules in both lungs, for example a 2 mm right lower   lobe pulmonary nodule (5; 62). Subpleural reticular opacities compatible   with interstitial lung disease. ICD leads noted. Status post sternotomy.   Cardiomegaly. Native coronary artery calcifications. Status post wedge   resection in the lingula.    LIVER: Within normal limits.  BILE DUCTS: Normal caliber.  GALLBLADDER: Within normal limits.  SPLEEN: Within normal limits.  PANCREAS: Within normal limits.  ADRENALS: Within normal limits.  KIDNEYS/URETERS: Bilateral renal cysts.    BLADDER: Within normal limits.  REPRODUCTIVE ORGANS: Prostate is moderately enlarged.    BOWEL: No bowel obstruction. Appendix is normal.  PERITONEUM: No ascites.  VESSELS: Infrarenal abdominal aortic aneurysm measuring 7.1 cm in   diameter containing a large amount of mural thrombus. There is a neck of   2 cm between the left renal artery origin and the takeoff of the   aneurysm. The aneurysm does not involve the iliac bifurcation or common   iliac arteries. The bilateral common, external and internal iliac   arteries demonstrate no significant stenosis. The common femoral,   profunda femoral and superficial femoral arteries are normal in caliber   and demonstrate no significant narrowing although note that the distal   femoral and popliteal arteries are not well visualized due to poor   contrast opacification.  RETROPERITONEUM/LYMPH NODES: No lymphadenopathy.  ABDOMINAL WALL: Within normal limits.  BONES: Within normal limits.    IMPRESSION:  7.1 cm infrarenal abdominal aortic aneurysm as detailed above.  8mm spiculated nodule in the right lower lobe worrisome for small primary   lung cancer.    Findings were discussed with Dr. TELLEZ 5/19/2023 3:58 PM by Dr. Link   with read back confirmation.    --- End of Report ---      < end of copied text >    < from: TTE Limited W or WO Ultrasound Enhancing Agent (05.22.23 @ 12:34) >  _______________________________________________________________________________________  CONCLUSIONS:      1. Technically difficult image quality.   2. Left ventricle suboptimally visualized despite intravenous ultrasonic enhancing agent. Severe left ventricular systolic dysfunction. The septum and the apex are akinetic. Estimated LV ejection fraction approximately 20-25% by visual estimation. No left ventricular thrombus seen.   3. Severely dilated left ventricular cavity size. Abnormal septal motion consistent with right ventricular pacemaker.   4. There is mild (grade 1) left ventricular diastolic dysfunction.   5. The right ventricle is not well visualized. grossly normal right ventricular cavity size.   6. Device lead is visualized in the right heart.   7. Symmetric mitral valve leaflet tethering.   8. Mild to moderate mitral regurgitation.   9. Aortic valve was not well visualized.  10. Trileaflet aortic valve.  11. Mild-to-moderate aortic regurgitation.  12. Compared to the transthoracic echocardiogram performed on 7/15/2016 results are similar on today's study.    < end of copied text >

## 2023-05-23 NOTE — PROGRESS NOTE ADULT - SUBJECTIVE AND OBJECTIVE BOX
VASCULAR SURGERY DAILY PROGRESS NOTE    SUBJECTIVE:  Patient seen and examined at bedside during morning rounds. No overnight events. Patient feeling well without complaints. For OR Thursday.     Vital Signs Last 24 Hrs  T(C): 36.6 (23 May 2023 05:16), Max: 36.9 (23 May 2023 01:15)  T(F): 97.9 (23 May 2023 05:16), Max: 98.4 (23 May 2023 01:15)  HR: 67 (23 May 2023 05:16) (60 - 67)  BP: 108/72 (23 May 2023 05:16) (96/60 - 117/79)  BP(mean): --  RR: 18 (23 May 2023 05:16) (18 - 18)  SpO2: 98% (23 May 2023 05:16) (96% - 98%)    Parameters below as of 23 May 2023 05:16  Patient On (Oxygen Delivery Method): room air    I&Os    05-22-23 @ 07:01  -  05-23-23 @ 07:00  --------------------------------------------------------  IN: 1060 mL / OUT: 2225 mL / NET: -1165 mL      PHYSICAL EXAM:  General: NAD, Lying in bed comfortably  Neuro: A+Ox3  Resp: Good effort, nonlabored breathing  GI/Abd: Soft, NT/ND, no rebound/guarding, palpable pulsatile mass in mid abd.   Vascular: All 4 extremities warm. b/l palpable fem/pop/dp, no wounds    MEDICATIONS:  aspirin  chewable 81 milliGRAM(s) Oral daily  atorvastatin 80 milliGRAM(s) Oral at bedtime  carvedilol 3.125 milliGRAM(s) Oral every 12 hours  furosemide    Tablet 20 milliGRAM(s) Oral daily  heparin   Injectable 5000 Unit(s) SubCutaneous every 8 hours  isosorbide   mononitrate ER Tablet (IMDUR) 30 milliGRAM(s) Oral daily  lisinopril 10 milliGRAM(s) Oral daily  pantoprazole    Tablet 40 milliGRAM(s) Oral before breakfast      LABS: AM labs pending

## 2023-05-23 NOTE — CONSULT NOTE ADULT - PROBLEM SELECTOR RECOMMENDATION 9
CT A/P with 8mm RLL nodule, few additional tiny nodules in both lungs  -Former smoker (at least 80 pack years)  -Suggest dedicated CT chest. CT A/P with 8mm RLL nodule, few additional tiny nodules in both lungs  -Former smoker (at least 80 pack years)  -CT A/P report also notes prior wedge resection in the lingula - pt denies any lung procedures in the past, only endorsing prior CABG  -Suggest dedicated CT chest.

## 2023-05-23 NOTE — PROGRESS NOTE ADULT - SUBJECTIVE AND OBJECTIVE BOX
Subjective: Patient seen and examined. No new events except as noted.     REVIEW OF SYSTEMS:    CONSTITUTIONAL: +weakness, fevers or chills  EYES/ENT: No visual changes;  No vertigo or throat pain   NECK: No pain or stiffness  RESPIRATORY: No cough, wheezing, hemoptysis; No shortness of breath  CARDIOVASCULAR: No chest pain or palpitations  GASTROINTESTINAL: No abdominal or epigastric pain. No nausea, vomiting, or hematemesis; No diarrhea or constipation. No melena or hematochezia.  GENITOURINARY: No dysuria, frequency or hematuria  NEUROLOGICAL: No numbness or weakness  SKIN: No itching, burning, rashes, or lesions   All other review of systems is negative unless indicated above.    MEDICATIONS:  MEDICATIONS  (STANDING):  albuterol/ipratropium for Nebulization 3 milliLiter(s) Nebulizer every 6 hours  aspirin  chewable 81 milliGRAM(s) Oral daily  atorvastatin 80 milliGRAM(s) Oral at bedtime  carvedilol 3.125 milliGRAM(s) Oral every 12 hours  furosemide    Tablet 20 milliGRAM(s) Oral daily  heparin   Injectable 5000 Unit(s) SubCutaneous every 8 hours  isosorbide   mononitrate ER Tablet (IMDUR) 30 milliGRAM(s) Oral daily  lisinopril 10 milliGRAM(s) Oral daily  pantoprazole    Tablet 40 milliGRAM(s) Oral before breakfast      PHYSICAL EXAM:  T(C): 36.6 (05-23-23 @ 17:38), Max: 36.9 (05-23-23 @ 01:15)  HR: 60 (05-23-23 @ 17:38) (60 - 67)  BP: 92/60 (05-23-23 @ 17:38) (92/60 - 117/79)  RR: 18 (05-23-23 @ 17:38) (16 - 18)  SpO2: 98% (05-23-23 @ 17:38) (97% - 98%)  Wt(kg): --  I&O's Summary    22 May 2023 07:01  -  23 May 2023 07:00  --------------------------------------------------------  IN: 1060 mL / OUT: 2225 mL / NET: -1165 mL    23 May 2023 07:01  -  23 May 2023 18:02  --------------------------------------------------------  IN: 530 mL / OUT: 1250 mL / NET: -720 mL          Appearance: Normal	  HEENT:   Normal oral mucosa, PERRL, EOMI	  Lymphatic: No lymphadenopathy , no edema  Cardiovascular: Normal S1 S2, No JVD, No murmurs , Peripheral pulses palpable 2+ bilaterally  Respiratory: Lungs clear to auscultation, normal effort 	  Gastrointestinal:  Soft, Non-tender, + BS	  Skin: No rashes, No ecchymoses, No cyanosis, warm to touch  Musculoskeletal: Normal range of motion, normal strength  Psychiatry:  Mood & affect appropriate  Ext: No edema      LABS:    CARDIAC MARKERS:                                12.0   5.53  )-----------( 167      ( 23 May 2023 07:12 )             37.3     05-23    139  |  103  |  13  ----------------------------<  87  4.2   |  25  |  0.97    Ca    9.2      23 May 2023 07:12  Phos  3.3     05-23  Mg     1.9     05-23    TPro  7.3  /  Alb  3.8  /  TBili  0.5  /  DBili  x   /  AST  35  /  ALT  25  /  AlkPhos  82  05-2          TELEMETRY: AV paced 	    ECG:  	  RADIOLOGY:   DIAGNOSTIC TESTING:  [ ] Echocardiogram:  < from: TTE Limited W or WO Ultrasound Enhancing Agent (05.22.23 @ 12:34) >  TRANSTHORACIC ECHOCARDIOGRAM REPORT  ________________________________________________________________________________                                      _______       Pt. Name:       CATHY MARTINES    Study Date:    5/22/2023  MRN:            [id number]   YOB: 1940  Accession #:    [id number]    Age:           83 years  Account#:       [id number] Gender:        M  Heart Rate:                  Height:        65.00 in (165.10 cm)  Rhythm:                      Weight:        125.00 lb(56.70 kg)  Blood Pressure: 104/62 mmHg  BSA/BMI:       1.62 m² / 20.80 kg/m²  ________________________________________________________________________________________  Referring Physician:    [id number] Raoul Tellez  Interpreting Physician: Miriam Milligan MD  Primary Sonographer:    Edith Mullen [facility]    CPT:                ECHO TTE WITH CON COMP W DOPP - .m; DEFINITY ECHO                      CONTRAST PER ML - .m  Indication(s):      Cardiac murmur, unspecified - R01.1  Procedure:          Transthoracic echocardiogram with 2-D, M-mode and complete                      spectral and color flow Doppler.  Ordering Location:  [facility]  Admission Status:   Inpatient  Contrast Injection: Verbal consent was obtained for injection of Ultrasonic                      Enhancing Agent following a discussion of risks and                      benefits.                      Endocardial visualization enhanced with 2 ml of Definity                      Ultrasound enhancing agent (Lot#:[id number] Exp.Date:[id number]                      Discarded Dose:8ml).  UEA Reaction:       Patient had no adverse reaction after injection of                      Ultrasound Enhancing Agent.  Study Information:  Image quality for this study is technically difficult.    _______________________________________________________________________________________  CONCLUSIONS:      1. Technically difficult image quality.   2. Left ventricle suboptimally visualized despite intravenous ultrasonic enhancing agent. Severe left ventricular systolic dysfunction. The septum and the apex are akinetic. Estimated LV ejection fraction approximately 20-25% by visual estimation. No left ventricular thrombus seen.   3. Severely dilated left ventricular cavity size. Abnormal septal motion consistent with right ventricular pacemaker.   4. There is mild (grade 1) left ventricular diastolic dysfunction.   5. The right ventricle is not well visualized. grossly normal right ventricular cavity size.   6. Device lead is visualized in the right heart.   7. Symmetric mitral valve leaflet tethering.   8. Mild to moderate mitral regurgitation.   9. Aortic valve was not well visualized.  10. Trileaflet aortic valve.  11. Mild-to-moderate aortic regurgitation.  12. Compared to the transthoracic echocardiogram performed on 7/15/2016 results are similar on today's study.    ________________________________________________________________________________________  FINDINGS:     Left Ventricle:  Severely dilated left ventricular cavitysize. The left ventricular wall thickness is normal. Abnormal (paradoxical) septal motion consistent with right ventricular pacemaker. There is mild (grade 1) left ventricular diastolic dysfunction. There is increased LV mass and eccentric hypertrophy. Left ventricle suboptimally visualized despite intravenous ultrasonic enhancing agent. Severe left ventricular systolic dysfunction. The septum and the apex are akinetic. Estimated LV ejection fraction approximately 20-25% by visual estimation. No left ventricular thrombus seen.     Right Ventricle:  The right ventricle is not well visualized. Grossly normal right ventricular cavity size. A device lead is visualized in the right heart.     Left Atrium:  The left atrium is moderately dilated.     Right Atrium:  The right atrium is normal with an indexed volume of 21.61 ml/m².     Aortic Valve:  The aortic valve was not well visualized. The aortic valve appears trileaflet. There is mild-to-moderate aortic regurgitation. AI VMax is 3.53 m/s. AI pressure half time is 661 msec. AI slope is 1.33 m/s².     Mitral Valve:  There is mild calcification of the mitral valve annulus. There is symmetric leaflet tethering. There is mild to moderate mitral regurgitation.     Tricuspid Valve:  Structurally normal tricuspid valve with normal leaflet excursion. There is trace tricuspid regurgitation. There is insufficient tricuspid regurgitation detected to calculate pulmonary artery systolic pressure.     Pulmonic Valve:  Structurally normal pulmonic valve with normal leaflet excursion.     Aorta:  The aortic annulus and aortic root appear normal in size.     Pericardium:  No pericardial effusion seen.     Systemic Veins:  The inferior vena cava was not well visualized. The inferior vena cava is dilated (dilated >2.1cm) with normal inspiratory collapse (normal >50%) consistent with mildly elevated right atrial pressure (~8, range 5-10mmHg).  ____________________________________________________________________  QUANTITATIVE DATA  Left VentricleMeasurements                         Indexed BSA  IVSd (2D):   0.5 cm  LVPWd (2D):  0.5 cm  LVIDd (2D):  8.1 cm  LVIDs (2D):  7.5 cm  LV Mass:     192 g  118.4 g/m²     MV E Vmax:    0.38 m/s  MV A Vmax:    0.91 m/s  MV E/A:       0.42  e' lateral:9.90 cm/s  e' medial:    4.24 cm/s  E/e' lateral: 3.81  E/e' medial:  8.89  E/e' Average: 5.33    Aorta Measurements     Ao Sinus: 3.1 cm       Left Atrium Measurements     LA Diam 2D: 4.70 cm    Right Atrial Measurements     RA Vol:       35.00 ml  RA Vol Index: 21.61 ml/m²       LVOT / RVOT/ Qp/Qs Data:  LVOT Diameter:   2.10 cm  LVOT Vmax:       0.64 m/s  LVOT VTI:        15.10 cm  LVOT SV:         52.3 ml  LVOT SV Indexed: 32.28 ml/m²    Aortic Valve Measurements     AV Vmax:          170.0 cm/s  AV Peak Gradient: 11.6 mmHg  AV Mean Gradient: 6.0 mmHg    Mitral Valve Measurements     MV E Vmax: 0.4 m/s  MV A Vmax: 0.9 m/s  MV E/A:    0.4       Tricuspid Valve Measurements     RA Pressure: 8 mmHg    ________________________________________________________________________________________  Diagnosing Physician: Miriam Milligan MD.  Electronically signed on 5/22/2023 at 2:26:01 PM by Miriam Milligan MD       < end of copied text >    [ ]  Catheterization:  [ ] Stress Test:    OTHER:

## 2023-05-23 NOTE — CONSULT NOTE ADULT - ASSESSMENT
82 y/o M with PMH of CAD (s/p PCI 2016, CABG 2002), CM with life vest x 3 months & AICD, HTN, HLD, former smoker, AAA (saw Dr. Tellez 5/17 in the office, CT A/P 5/19 with 7.1 cm infrarenal abdominal aortic aneurysm). Presents to the ED for preop monitoring and optimization prior to repair of AAA. Pulmonary called to consult for lung nodules seen on CT A/P.  84 y/o M with PMH of CAD (s/p PCI 2016, CABG 2002), CM with life vest x 3 months & AICD, HTN, HLD, former smoker, AAA (saw Dr. Tellez 5/17 in the office, CT A/P 5/19 with 7.1 cm infrarenal abdominal aortic aneurysm). Presents to the ED for preop monitoring and optimization prior to repair of AAA. Pulmonary called to consult for lung nodules seen on CT A/P.

## 2023-05-23 NOTE — PROGRESS NOTE ADULT - ASSESSMENT
Patient is an 83 year old male with a PMHx of HTN, HLD, CM, CAD ( S/P 3 vessel CABG 2002), PCI to RCA 04/11/2016, life vest x 3 months, former smoker ( 80 pack years),  AICD who presented to his outpatient vascular surgeon's office, Dr. Tellez, on 05/17/2023. Patient presented with a reports of a large abdominal aortic aneurysm. Per chart review, patient reported a pulsatile mass in his abdomen. Patient underwent a duplex study that demonstrated an 8.5 cm abdominal aortic aneurysm. Patient underwent CTA as an outpatient on 05/19/2023 and was found to have a 7.1 cm infrarenal aortic aneurysm containing a large mural thrombus. Patient presents to [facility] for elective repair with vascular surgery. Internal Medicine has been consulted on Mr. Nieto's care for medical management. Patient denies abdominal or back pain. Denies chest pain, palpitations, shortness of breath or dyspnea.         Infrarenal AAA associated with Mural Thrombus  - CTA with 7.1 cm infrarenal abdominal aortic aneurysm   - BP control   - Monitor on telemetry   - Planned for EVAR with Vascular Surgery on 05/24  - Care as per vascular surgery appreciated     CAD S/P CABG, Ischemic Cardiomyopathy   - S/P PCI to RCA in 2016, S/P AICD placement   - TTE from 2016 with Moderate to severe MR, Mild AR, Severe LV systolic dysfunction, akinesis, Stage 1 diastolic dysfunction, Minimal TR, Minimal ND, Mild Pulm HTN   - On ASA and Statin  - GDMT as per Cardio - On Lasix, Coreg, Lisinopril; Consider holding ACEI for 48 hours and starting Entresto following 48 hour washout period for further GDMT.   - Monitor on telemetry   - 05/22 TTE with EF of 20-25%, Severe LV Systolic dysfunction, akinesis to septum and apex, no LV thrombus, Severely dilated LV, Mild Grade 1 LV Dysfunction, Mild- Mod to MR, Mild to Mod AR --> F/u cardio recs    Moderate - Severe Mitral Regurgitation  - Seen on TTE from 2016  - F/u repeat TTE as above  - F/u cardio recs     Abnormal CT, Pulm nodule and ILD   - CTA with -- 8mm spiculated nodule in the right lower lobe worrisome for small primary lung cancer. --   - F/u dedicated CT Chest   - Pulmonary eval appreciated; F/u recs     Anemia  - Monitor H/H closely, monitor for gross bleeding  - Continue to monitor and trend levels  - Transfuse for Hgb <8.0 in view of CAD     HTN   - On Lisinopril 10, Imdur 30, Lasix 20 PO, Coreg 3.125 BID   - Monitor BP, VS and patient closely     HLD  - C/w Lipitor 80     Pre-OP Risk Stratification   - Pending repeat TTE   - AICD interrogation     PPX  - PPI and Hep 5K Q 8        Patient is an 83 year old male with a PMHx of HTN, HLD, CM, CAD ( S/P 3 vessel CABG 2002), PCI to RCA 04/11/2016, life vest x 3 months, former smoker ( 80 pack years),  AICD who presented to his outpatient vascular surgeon's office, Dr. Tellez, on 05/17/2023. Patient presented with a reports of a large abdominal aortic aneurysm. Per chart review, patient reported a pulsatile mass in his abdomen. Patient underwent a duplex study that demonstrated an 8.5 cm abdominal aortic aneurysm. Patient underwent CTA as an outpatient on 05/19/2023 and was found to have a 7.1 cm infrarenal aortic aneurysm containing a large mural thrombus. Patient presents to [facility] for elective repair with vascular surgery. Internal Medicine has been consulted on Mr. Nieto's care for medical management. Patient denies abdominal or back pain. Denies chest pain, palpitations, shortness of breath or dyspnea.         Infrarenal AAA associated with Mural Thrombus  - CTA with 7.1 cm infrarenal abdominal aortic aneurysm   - BP control   - Monitor on telemetry   - Planned for EVAR with Vascular Surgery on 05/24  - Care as per vascular surgery appreciated     CAD S/P CABG, Ischemic Cardiomyopathy   - S/P PCI to RCA in 2016, S/P AICD placement   - TTE from 2016 with Moderate to severe MR, Mild AR, Severe LV systolic dysfunction, akinesis, Stage 1 diastolic dysfunction, Minimal TR, Minimal SD, Mild Pulm HTN   - On ASA and Statin  - GDMT as per Cardio - On Lasix, Coreg, Lisinopril; Consider holding ACEI for 48 hours and starting Entresto following 48 hour washout period for further GDMT.   - Monitor on telemetry   - 05/22 TTE with EF of 20-25%, Severe LV Systolic dysfunction, akinesis to septum and apex, no LV thrombus, Severely dilated LV, Mild Grade 1 LV Dysfunction, Mild- Mod to MR, Mild to Mod AR --> F/u cardio recs    Moderate - Severe Mitral Regurgitation  - Seen on TTE from 2016  - F/u repeat TTE as above  - F/u cardio recs     Abnormal CT, Pulm nodule and ILD   - CTA with -- 8mm spiculated nodule in the right lower lobe worrisome for small primary lung cancer. --   - F/u dedicated CT Chest   - Pulmonary eval appreciated; F/u recs     Anemia  - Monitor H/H closely, monitor for gross bleeding  - Continue to monitor and trend levels  - Transfuse for Hgb <8.0 in view of CAD     HTN   - On Lisinopril 10, Imdur 30, Lasix 20 PO, Coreg 3.125 BID   - Monitor BP, VS and patient closely     HLD  - C/w Lipitor 80     Pre-OP Risk Stratification   - TTE noted, low EF, no improvement from prior studies 2016  - AICD interrogation pending   - patient is medically optimized for OR , high risk for vascular intervention     PPX  - PPI and Hep 5K Q 8

## 2023-05-23 NOTE — CONSULT NOTE ADULT - NS ATTEND AMEND GEN_ALL_CORE FT
Sub-Acute rehab
Pt care and plan discussed and reviewed with PA. Plan as outlined above edited by me to reflect our discussion. Advanced care planning/advanced directives discussed with patient/family. DNR status including forceful chest compressions to attempt to restart the heart, ventilator support/artificial breathing, electric shock, artificial nutrition, health care proxy, Molst form all discussed with pt.
pt with suspicious subcm spiculated nodule, likely malignant  suggest ct chest wo contrast  clinically no sob, breath sounds good  sat 100% on ra  suggest periop bdilators  no pulmonary contraindication to AAA repair  will address possible malignance after recovered from AAA surgery  estephania pt and family and md

## 2023-05-23 NOTE — CONSULT NOTE ADULT - PROBLEM SELECTOR RECOMMENDATION 2
CT A/P 5/19 with 7.1 cm infrarenal abdominal aortic aneurysm  -Per vascular  -OR planned for Thursday AM 5/25/23
[FreeTextEntry1] : LAURA BENZ  is a 79 year F  who presents today  with the above history and the following active issues. \par \par Lower extremity swelling.  Significant sulfa allergies.  Chlorthalidone recently changed to Ethacrynic acid 25mg QD. Bumex was not covered by insurance. Swelling is improved although not resolved.  Also follow recommendation by vascular surgery for venous insufficiency\par \par HTN -uncontrolled.  Recent significant elevation of blood pressure in your office.  Stable ventricular rate at rest.  Mild headache associated with hydralazine to 50 mg twice daily.  If continued headache.  We may need to decrease dose of hydralazine at 25 mg twice daily.  Continue irbesartan 300 mg and carvedilol 12.5 mg twice daily.  If needed to decrease hydralazine change carvedilol to labetalol.\par Goal less than 130/80.  Low-salt diet.  Risk benefits alternatives side effects reviewed.  She will contact me for any change in her symptoms.\par Lifestyle and risk factor modification\par No evidence of renal artery stenosis.\par Follow-up on labs which includes running out of and metanephrine.\par \par Multiple risk factors for atherosclerotic vascular disease.   Carotid Doppler with non-obstructive disease.\par \par Dyslipidemia.  A continue statin therapy.  Lifestyle modifications.  Low saturated fat carbohydrate intake\par \par Graves' disease. F/U PCP. \par \par Mild aortic insufficiency, borderline pulmonary hypertension.  Stable last echocardiogram\par \par Red flag symptoms which would warrant sooner emergent evaluation reviewed with the patient. \par Questions and concerns were addressed and answered. \par \par Counseling regarding low saturated fat, salt and carbohydrate intake was reviewed. Active lifestyle and regular. Exercise along with weight management is advised.\par All the above were at length reviewed. Answered all the questions. Thank you very much for this kind referral. Please do not hesitate to give me a call for any question.\par Part of this transcription was done with voice recognition software and phonetically similar errors are common. I apologize for that. Please do not hesitate to call for any questions due to above.\par \par Sincerely,\par Jalen Everett MD,FACC,FASEVELIO\par 
s/p ICD   cont with meds  appears compensated

## 2023-05-24 ENCOUNTER — APPOINTMENT (OUTPATIENT)
Dept: VASCULAR SURGERY | Facility: CLINIC | Age: 83
End: 2023-05-24

## 2023-05-24 ENCOUNTER — TRANSCRIPTION ENCOUNTER (OUTPATIENT)
Age: 83
End: 2023-05-24

## 2023-05-24 DIAGNOSIS — I51.3 INTRACARDIAC THROMBOSIS, NOT ELSEWHERE CLASSIFIED: ICD-10-CM

## 2023-05-24 LAB
ANION GAP SERPL CALC-SCNC: 11 MMOL/L — SIGNIFICANT CHANGE UP (ref 5–17)
BASE EXCESS BLDV CALC-SCNC: 1.7 MMOL/L — SIGNIFICANT CHANGE UP (ref -2–3)
BUN SERPL-MCNC: 17 MG/DL — SIGNIFICANT CHANGE UP (ref 7–23)
CALCIUM SERPL-MCNC: 9 MG/DL — SIGNIFICANT CHANGE UP (ref 8.4–10.5)
CHLORIDE SERPL-SCNC: 102 MMOL/L — SIGNIFICANT CHANGE UP (ref 96–108)
CO2 BLDV-SCNC: 30 MMOL/L — HIGH (ref 22–26)
CO2 SERPL-SCNC: 23 MMOL/L — SIGNIFICANT CHANGE UP (ref 22–31)
CREAT SERPL-MCNC: 1.02 MG/DL — SIGNIFICANT CHANGE UP (ref 0.5–1.3)
EGFR: 73 ML/MIN/1.73M2 — SIGNIFICANT CHANGE UP
GAS PNL BLDV: SIGNIFICANT CHANGE UP
GLUCOSE SERPL-MCNC: 90 MG/DL — SIGNIFICANT CHANGE UP (ref 70–99)
HCO3 BLDV-SCNC: 28 MMOL/L — SIGNIFICANT CHANGE UP (ref 22–29)
HCT VFR BLD CALC: 36.5 % — LOW (ref 39–50)
HGB BLD-MCNC: 11.5 G/DL — LOW (ref 13–17)
MAGNESIUM SERPL-MCNC: 1.9 MG/DL — SIGNIFICANT CHANGE UP (ref 1.6–2.6)
MCHC RBC-ENTMCNC: 28.9 PG — SIGNIFICANT CHANGE UP (ref 27–34)
MCHC RBC-ENTMCNC: 31.5 GM/DL — LOW (ref 32–36)
MCV RBC AUTO: 91.7 FL — SIGNIFICANT CHANGE UP (ref 80–100)
NRBC # BLD: 0 /100 WBCS — SIGNIFICANT CHANGE UP (ref 0–0)
PCO2 BLDV: 51 MMHG — SIGNIFICANT CHANGE UP (ref 42–55)
PH BLDV: 7.35 — SIGNIFICANT CHANGE UP (ref 7.32–7.43)
PHOSPHATE SERPL-MCNC: 3.7 MG/DL — SIGNIFICANT CHANGE UP (ref 2.5–4.5)
PLATELET # BLD AUTO: 166 K/UL — SIGNIFICANT CHANGE UP (ref 150–400)
PO2 BLDV: 26 MMHG — SIGNIFICANT CHANGE UP (ref 25–45)
POTASSIUM SERPL-MCNC: 4.2 MMOL/L — SIGNIFICANT CHANGE UP (ref 3.5–5.3)
POTASSIUM SERPL-SCNC: 4.2 MMOL/L — SIGNIFICANT CHANGE UP (ref 3.5–5.3)
RBC # BLD: 3.98 M/UL — LOW (ref 4.2–5.8)
RBC # FLD: 14.2 % — SIGNIFICANT CHANGE UP (ref 10.3–14.5)
SAO2 % BLDV: 31.8 % — LOW (ref 67–88)
SODIUM SERPL-SCNC: 136 MMOL/L — SIGNIFICANT CHANGE UP (ref 135–145)
WBC # BLD: 7.21 K/UL — SIGNIFICANT CHANGE UP (ref 3.8–10.5)
WBC # FLD AUTO: 7.21 K/UL — SIGNIFICANT CHANGE UP (ref 3.8–10.5)

## 2023-05-24 PROCEDURE — 71250 CT THORAX DX C-: CPT | Mod: 26

## 2023-05-24 RX ORDER — SODIUM CHLORIDE 9 MG/ML
1000 INJECTION, SOLUTION INTRAVENOUS
Refills: 0 | Status: DISCONTINUED | OUTPATIENT
Start: 2023-05-24 | End: 2023-05-25

## 2023-05-24 RX ADMIN — Medication 81 MILLIGRAM(S): at 11:37

## 2023-05-24 RX ADMIN — HEPARIN SODIUM 5000 UNIT(S): 5000 INJECTION INTRAVENOUS; SUBCUTANEOUS at 21:53

## 2023-05-24 RX ADMIN — ATORVASTATIN CALCIUM 80 MILLIGRAM(S): 80 TABLET, FILM COATED ORAL at 21:53

## 2023-05-24 RX ADMIN — PANTOPRAZOLE SODIUM 40 MILLIGRAM(S): 20 TABLET, DELAYED RELEASE ORAL at 05:47

## 2023-05-24 RX ADMIN — HEPARIN SODIUM 5000 UNIT(S): 5000 INJECTION INTRAVENOUS; SUBCUTANEOUS at 05:48

## 2023-05-24 RX ADMIN — HEPARIN SODIUM 5000 UNIT(S): 5000 INJECTION INTRAVENOUS; SUBCUTANEOUS at 13:07

## 2023-05-24 RX ADMIN — Medication 3 MILLILITER(S): at 11:38

## 2023-05-24 RX ADMIN — Medication 3 MILLILITER(S): at 23:17

## 2023-05-24 RX ADMIN — Medication 3 MILLILITER(S): at 05:48

## 2023-05-24 NOTE — PROGRESS NOTE ADULT - SUBJECTIVE AND OBJECTIVE BOX
Subjective: Patient seen and examined. No new events except as noted.     REVIEW OF SYSTEMS:    CONSTITUTIONAL: +weakness, fevers or chills  EYES/ENT: No visual changes;  No vertigo or throat pain   NECK: No pain or stiffness  RESPIRATORY: No cough, wheezing, hemoptysis; No shortness of breath  CARDIOVASCULAR: No chest pain or palpitations  GASTROINTESTINAL: No abdominal or epigastric pain. No nausea, vomiting, or hematemesis; No diarrhea or constipation. No melena or hematochezia.  GENITOURINARY: No dysuria, frequency or hematuria  NEUROLOGICAL: No numbness or weakness  SKIN: No itching, burning, rashes, or lesions   All other review of systems is negative unless indicated above.    MEDICATIONS:  MEDICATIONS  (STANDING):  albuterol/ipratropium for Nebulization 3 milliLiter(s) Nebulizer every 6 hours  aspirin  chewable 81 milliGRAM(s) Oral daily  atorvastatin 80 milliGRAM(s) Oral at bedtime  carvedilol 3.125 milliGRAM(s) Oral every 12 hours  dextrose 5% + sodium chloride 0.9%. 1000 milliLiter(s) (100 mL/Hr) IV Continuous <Continuous>  furosemide    Tablet 20 milliGRAM(s) Oral daily  heparin   Injectable 5000 Unit(s) SubCutaneous every 8 hours  isosorbide   mononitrate ER Tablet (IMDUR) 30 milliGRAM(s) Oral daily  lisinopril 10 milliGRAM(s) Oral daily  pantoprazole    Tablet 40 milliGRAM(s) Oral before breakfast      PHYSICAL EXAM:  T(C): 36.6 (05-24-23 @ 17:14), Max: 36.6 (05-23-23 @ 17:38)  HR: 60 (05-24-23 @ 17:14) (60 - 90)  BP: 97/60 (05-24-23 @ 17:14) (92/57 - 109/68)  RR: 18 (05-24-23 @ 17:14) (18 - 18)  SpO2: 96% (05-24-23 @ 17:14) (96% - 99%)  Wt(kg): --  I&O's Summary    23 May 2023 07:01  -  24 May 2023 07:00  --------------------------------------------------------  IN: 1130 mL / OUT: 2225 mL / NET: -1095 mL    24 May 2023 07:01  -  24 May 2023 17:30  --------------------------------------------------------  IN: 480 mL / OUT: 650 mL / NET: -170 mL              Appearance: Normal	  HEENT:   Normal oral mucosa, PERRL, EOMI	  Lymphatic: No lymphadenopathy , no edema  Cardiovascular: Normal S1 S2, No JVD, No murmurs , Peripheral pulses palpable 2+ bilaterally  Respiratory: Lungs clear to auscultation, normal effort 	  Gastrointestinal:  Soft, Non-tender, + BS	  Skin: No rashes, No ecchymoses, No cyanosis, warm to touch  Musculoskeletal: Normal range of motion, normal strength  Psychiatry:  Mood & affect appropriate  Ext: No edema        LABS:    CARDIAC MARKERS:                                11.5   7.21  )-----------( 166      ( 24 May 2023 07:12 )             36.5     05-24    136  |  102  |  17  ----------------------------<  90  4.2   |  23  |  1.02    Ca    9.0      24 May 2023 07:12  Phos  3.7     05-24  Mg     1.9     05-24      proBNP:   Lipid Profile:   HgA1c:   TSH:             TELEMETRY: 	    ECG:  	  RADIOLOGY:   DIAGNOSTIC TESTING:  [ ] Echocardiogram:  [ ]  Catheterization:  [ ] Stress Test:    OTHER:

## 2023-05-24 NOTE — PROGRESS NOTE ADULT - NS ATTEND AMEND GEN_ALL_CORE FT
Pt care and plan discussed and reviewed with PA. Plan as outlined above edited by me to reflect our discussion.     Discussed with vascular team
ct chest with suspicious nodules however this should not delay vascular surgery. pt ok for AAA repair from pulmonary perspective  suggest repeat ct chest in 2-3 weeks after recovery from AAA repair and will then do further lr regarding chest   dw pt and family

## 2023-05-24 NOTE — PROGRESS NOTE ADULT - ASSESSMENT
84 y/o M with PMH of CAD (s/p PCI 2016, CABG 2002), CM with life vest x 3 months & AICD, HTN, HLD, former smoker, AAA (saw Dr. Tellez 5/17 in the office, CT A/P 5/19 with 7.1 cm infrarenal abdominal aortic aneurysm). Presents to the ED for preop monitoring and optimization prior to repair of AAA. Pulmonary called to consult for lung nodules seen on CT A/P.

## 2023-05-24 NOTE — PROGRESS NOTE ADULT - SUBJECTIVE AND OBJECTIVE BOX
VASCULAR SURGERY DAILY PROGRESS NOTE    SUBJECTIVE:  Patient seen and examined at bedside during morning rounds. No overnight events. Patient feeling well without complaints. For OR tomorrow.     Vital Signs Last 24 Hrs  T(C): 36.4 (24 May 2023 05:51), Max: 36.6 (23 May 2023 13:05)  T(F): 97.6 (24 May 2023 05:51), Max: 97.9 (23 May 2023 13:05)  HR: 68 (24 May 2023 05:51) (60 - 90)  BP: 98/58 (24 May 2023 05:34) (92/60 - 109/68)  BP(mean): --  RR: 18 (24 May 2023 05:51) (16 - 18)  SpO2: 96% (24 May 2023 05:51) (96% - 99%)    Parameters below as of 24 May 2023 05:51  Patient On (Oxygen Delivery Method): room air      I&Os    05-23-23 @ 07:01  -  05-24-23 @ 07:00  --------------------------------------------------------  IN: 1130 mL / OUT: 2225 mL / NET: -1095 mL      PHYSICAL EXAM:  General: NAD, Lying in bed comfortably  Neuro: A+Ox3  Resp: Good effort, nonlabored breathing  GI/Abd: Soft, NT/ND, no rebound/guarding, palpable pulsatile mass in mid abd.   Vascular: All 4 extremities warm. b/l palpable fem/pop/dp, no wounds    MEDICATIONS:  albuterol/ipratropium for Nebulization 3 milliLiter(s) Nebulizer every 6 hours  aspirin  chewable 81 milliGRAM(s) Oral daily  atorvastatin 80 milliGRAM(s) Oral at bedtime  carvedilol 3.125 milliGRAM(s) Oral every 12 hours  furosemide    Tablet 20 milliGRAM(s) Oral daily  heparin   Injectable 5000 Unit(s) SubCutaneous every 8 hours  isosorbide   mononitrate ER Tablet (IMDUR) 30 milliGRAM(s) Oral daily  lisinopril 10 milliGRAM(s) Oral daily  pantoprazole    Tablet 40 milliGRAM(s) Oral before breakfast      LABS:                        11.5   7.21  )-----------( 166      ( 24 May 2023 07:12 )             36.5     05-24    136  |  102  |  17  ----------------------------<  90  4.2   |  23  |  1.02    Ca    9.0      24 May 2023 07:12  Phos  3.7     05-24  Mg     1.9     05-24

## 2023-05-24 NOTE — PROGRESS NOTE ADULT - ASSESSMENT
Patient is an 83 year old male with a PMHx of HTN, HLD, CM, CAD ( S/P 3 vessel CABG 2002), PCI to RCA 04/11/2016, life vest x 3 months, former smoker ( 80 pack years),  AICD who presented to his outpatient vascular surgeon's office, Dr. Tellez, on 05/17/2023. Patient presented with a reports of a large abdominal aortic aneurysm. Per chart review, patient reported a pulsatile mass in his abdomen. Patient underwent a duplex study that demonstrated an 8.5 cm abdominal aortic aneurysm. Patient underwent CTA as an outpatient on 05/19/2023 and was found to have a 7.1 cm infrarenal aortic aneurysm containing a large mural thrombus. Patient presents to [facility] for elective repair with vascular surgery. Internal Medicine has been consulted on Mr. Nieto's care for medical management. Patient denies abdominal or back pain. Denies chest pain, palpitations, shortness of breath or dyspnea.         Infrarenal AAA associated with Mural Thrombus  - CTA with 7.1 cm infrarenal abdominal aortic aneurysm   - BP control   - Monitor on telemetry   - Planned for EVAR with Vascular Surgery on 05/24  - Care as per vascular surgery appreciated     CAD S/P CABG, Ischemic Cardiomyopathy   - S/P PCI to RCA in 2016, S/P AICD placement   - TTE from 2016 with Moderate to severe MR, Mild AR, Severe LV systolic dysfunction, akinesis, Stage 1 diastolic dysfunction, Minimal TR, Minimal IL, Mild Pulm HTN   - On ASA and Statin  - GDMT as per Cardio - On Lasix, Coreg, Lisinopril; Consider holding ACEI for 48 hours and starting Entresto following 48 hour washout period for further GDMT.   - Monitor on telemetry   - 05/22 TTE with EF of 20-25%, Severe LV Systolic dysfunction, akinesis to septum and apex, no LV thrombus, Severely dilated LV, Mild Grade 1 LV Dysfunction, Mild- Mod to MR, Mild to Mod AR --> F/u cardio recs    Moderate - Severe Mitral Regurgitation  - Seen on TTE from 2016  - F/u repeat TTE as above  - F/u cardio recs     Abnormal CT, Pulm nodule and ILD   - CTA with -- 8mm spiculated nodule in the right lower lobe worrisome for small primary lung cancer. --   - F/u dedicated CT Chest   - Pulmonary eval appreciated; F/u recs     Anemia  - Monitor H/H closely, monitor for gross bleeding  - Continue to monitor and trend levels  - Transfuse for Hgb <8.0 in view of CAD     HTN   - On Lisinopril 10, Imdur 30, Lasix 20 PO, Coreg 3.125 BID   - Monitor BP, VS and patient closely     HLD  - C/w Lipitor 80     Pre-OP Risk Stratification   - TTE noted, low EF, no improvement from prior studies 2016  - AICD interrogation pending   - patient is medically optimized for OR , high risk for vascular intervention     PPX  - PPI and Hep 5K Q 8

## 2023-05-24 NOTE — PROGRESS NOTE ADULT - SUBJECTIVE AND OBJECTIVE BOX
Name of Patient : CATHY MARTINES  MRN: [id number]  Date of visit: 05-24-23      Subjective: Patient seen and examined. No new events except as noted.     REVIEW OF SYSTEMS:    CONSTITUTIONAL: No weakness, fevers or chills  EYES/ENT: No visual changes;  No vertigo or throat pain   NECK: No pain or stiffness  RESPIRATORY: No cough, wheezing, hemoptysis; No shortness of breath  CARDIOVASCULAR: No chest pain or palpitations  GASTROINTESTINAL: No abdominal or epigastric pain. No nausea, vomiting, or hematemesis; No diarrhea or constipation. No melena or hematochezia.  GENITOURINARY: No dysuria, frequency or hematuria  NEUROLOGICAL: No numbness or weakness  SKIN: No itching, burning, rashes, or lesions   All other review of systems is negative unless indicated above.    MEDICATIONS:  MEDICATIONS  (STANDING):  albuterol/ipratropium for Nebulization 3 milliLiter(s) Nebulizer every 6 hours  aspirin  chewable 81 milliGRAM(s) Oral daily  atorvastatin 80 milliGRAM(s) Oral at bedtime  carvedilol 3.125 milliGRAM(s) Oral every 12 hours  dextrose 5% + sodium chloride 0.9%. 1000 milliLiter(s) (100 mL/Hr) IV Continuous <Continuous>  furosemide    Tablet 20 milliGRAM(s) Oral daily  heparin   Injectable 5000 Unit(s) SubCutaneous every 8 hours  isosorbide   mononitrate ER Tablet (IMDUR) 30 milliGRAM(s) Oral daily  lisinopril 10 milliGRAM(s) Oral daily  pantoprazole    Tablet 40 milliGRAM(s) Oral before breakfast      PHYSICAL EXAM:  T(C): 36.4 (05-24-23 @ 21:56), Max: 36.6 (05-24-23 @ 09:09)  HR: 56 (05-24-23 @ 21:56) (56 - 90)  BP: 108/70 (05-24-23 @ 21:56) (92/57 - 108/70)  RR: 18 (05-24-23 @ 21:56) (18 - 18)  SpO2: 99% (05-24-23 @ 21:56) (96% - 99%)  Wt(kg): --  I&O's Summary    23 May 2023 07:01  -  24 May 2023 07:00  --------------------------------------------------------  IN: 1130 mL / OUT: 2225 mL / NET: -1095 mL    24 May 2023 07:01  -  24 May 2023 23:37  --------------------------------------------------------  IN: 720 mL / OUT: 1200 mL / NET: -480 mL          Appearance: Normal	  HEENT:  PERRLA   Lymphatic: No lymphadenopathy   Cardiovascular: Normal S1 S2, no JVD  Respiratory: normal effort , clear  Gastrointestinal:  Soft, Non-tender  Skin: No rashes,  warm to touch  Psychiatry:  Mood & affect appropriate  Musculuskeletal: No edema    recent labs, Imaging and EKGs personally reviewed     05-23-23 @ 07:01  -  05-24-23 @ 07:00  --------------------------------------------------------  IN: 1130 mL / OUT: 2225 mL / NET: -1095 mL    05-24-23 @ 07:01  -  05-24-23 @ 23:37  --------------------------------------------------------  IN: 720 mL / OUT: 1200 mL / NET: -480 mL                          11.5   7.21  )-----------( 166      ( 24 May 2023 07:12 )             36.5               05-24    136  |  102  |  17  ----------------------------<  90  4.2   |  23  |  1.02    Ca    9.0      24 May 2023 07:12  Phos  3.7     05-24  Mg     1.9     05-24

## 2023-05-24 NOTE — PROGRESS NOTE ADULT - ASSESSMENT
83-year-old gentleman PMH HTN, HLD, CM, CAD ( S/P 3 vessel CABG 2002), PCI to RCA 4/11/16, life vest x 3 months, former smoker ( 80 pack years),  AICD presented to the Dr. Tellez's office on 5/17/2032 with a report of a large abdominal aortic aneurysm. The patient's granddaughter stated that several weeks ago the patient was lying down and he felt a pulsatile mass in his abdomen. Patient went a duplex study which showed an 8.5 cm abdominal aortic aneurysm. Patient does not have complaints of abdominal or back pain. CTA outpatient on 5/19/2023 showed  infrarenal abdominal aortic aneurysm measuring 7.1 cm in diameter containing a large amount of mural thrombus. Patient presented to ER today for preop monitor and optimization.     Plan:  - Regular diet  - ASA, SQH  - Cardiology consult appreciated- will need AICD interrogation prior to OR  - TTE: Severe left ventricular systolic dysfunction with estimated LV EF ~20-25%  - Medicine consult appreciated  - Pulm recommendations appreciated, chest CT pending   - OR planned for Thursday AM 5/25/23  - Med/cardio cleared, will preop today    Vascular Surgery  [phone]

## 2023-05-24 NOTE — PROGRESS NOTE ADULT - SUBJECTIVE AND OBJECTIVE BOX
Follow-up Pulm Progress Note    No new respiratory events overnight.  Denies SOB/CP.     Medications:  MEDICATIONS  (STANDING):  albuterol/ipratropium for Nebulization 3 milliLiter(s) Nebulizer every 6 hours  aspirin  chewable 81 milliGRAM(s) Oral daily  atorvastatin 80 milliGRAM(s) Oral at bedtime  carvedilol 3.125 milliGRAM(s) Oral every 12 hours  dextrose 5% + sodium chloride 0.9%. 1000 milliLiter(s) (100 mL/Hr) IV Continuous <Continuous>  furosemide    Tablet 20 milliGRAM(s) Oral daily  heparin   Injectable 5000 Unit(s) SubCutaneous every 8 hours  isosorbide   mononitrate ER Tablet (IMDUR) 30 milliGRAM(s) Oral daily  lisinopril 10 milliGRAM(s) Oral daily  pantoprazole    Tablet 40 milliGRAM(s) Oral before breakfast    Vital Signs Last 24 Hrs  T(C): 36.4 (24 May 2023 05:51), Max: 36.6 (23 May 2023 13:05)  T(F): 97.6 (24 May 2023 05:51), Max: 97.9 (23 May 2023 13:05)  HR: 68 (24 May 2023 05:51) (60 - 90)  BP: 98/58 (24 May 2023 05:34) (92/60 - 109/68)  BP(mean): --  RR: 18 (24 May 2023 05:51) (17 - 18)  SpO2: 96% (24 May 2023 05:51) (96% - 99%)    Parameters below as of 24 May 2023 05:51  Patient On (Oxygen Delivery Method): room air    VBG pH 7.35 05-24 @ 07:17    VBG pCO2 51 05-24 @ 07:17    VBG O2 sat 31.8 05-24 @ 07:17    VBG lactate -- 05-24 @ 07:17      05-23 @ 07:01  -  05-24 @ 07:00  --------------------------------------------------------  IN: 1130 mL / OUT: 2225 mL / NET: -1095 mL    LABS:                        11.5   7.21  )-----------( 166      ( 24 May 2023 07:12 )             36.5     05-24    136  |  102  |  17  ----------------------------<  90  4.2   |  23  |  1.02    Ca    9.0      24 May 2023 07:12  Phos  3.7     05-24  Mg     1.9     05-24      Physical Examination:  PULM: Clear to auscultation bilaterally, no significant sputum production  CVS: S1, S2 heard    RADIOLOGY REVIEWED  < from: CT Angio Abdomen and Pelvis w/ IV Cont (05.19.23 @ 15:12) >  *** ADDENDUM # 1 ***    Upon further review, there is a small amount of low density at the left   ventricular apex suspect for thrombus. 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    --- End of Report ---    *** END OF ADDENDUM # 1 ***      PROCEDURE DATE:  05/19/2023          INTERPRETATION:  CLINICAL INFORMATION: Abdominal aortic aneurysm    COMPARISON: None.    CONTRAST/COMPLICATIONS:  IV Contrast: Omnipaque 350  110 cc administered   90 cc discarded  Oral Contrast: NONE  Complications: None reported at time of study completion    PROCEDURE:  CT Angiography of the Abdomen, pelvis and lower extremities.  Arterial phase images were acquired.  Sagittal and coronal reformats were performed as well as 3D (MIP)   reconstructions.    FINDINGS:  LOWER CHEST: 8 mm spiculated right lower lobe pulmonary nodule. A few   additional tiny nodules in both lungs, for example a 2 mm right lower   lobe pulmonary nodule (5; 62). Subpleural reticular opacities compatible   with interstitial lung disease. ICD leads noted. Status post sternotomy.   Cardiomegaly. Native coronary artery calcifications. Status post wedge   resection in the lingula.    LIVER: Within normal limits.  BILE DUCTS: Normal caliber.  GALLBLADDER: Within normal limits.  SPLEEN: Within normal limits.  PANCREAS: Within normal limits.  ADRENALS: Within normal limits.  KIDNEYS/URETERS: Bilateral renal cysts.    BLADDER: Within normal limits.  REPRODUCTIVE ORGANS: Prostate is moderately enlarged.    BOWEL: No bowel obstruction. Appendix is normal.  PERITONEUM: No ascites.  VESSELS: Infrarenal abdominal aortic aneurysm measuring 7.1 cm in   diameter containing a large amount of mural thrombus. There is a neck of   2 cm between the left renal artery origin and the takeoff of the   aneurysm. The aneurysm does not involve the iliac bifurcation or common   iliac arteries. The bilateral common, external and internal iliac   arteries demonstrate no significant stenosis. The common femoral,   profunda femoral and superficial femoral arteries are normal in caliber   and demonstrate no significant narrowing although note that the distal   femoral and popliteal arteries are not well visualized due to poor   contrast opacification.  RETROPERITONEUM/LYMPH NODES: No lymphadenopathy.  ABDOMINAL WALL: Within normal limits.  BONES: Within normal limits.    IMPRESSION:  7.1 cm infrarenal abdominal aortic aneurysm as detailed above.  8mm spiculated nodule in the right lower lobe worrisome for small primary   lung cancer.    Findings were discussed with Dr. ALFONSO 5/19/2023 3:58 PM by Dr. Link   with read back confirmation.    --- End of Report ---    ***Please see theaddendum at the top of this report. It may contain   additional important information or changes.****    < end of copied text >    CONCLUSIONS:      1. Technically difficult image quality.   2. Left ventricle suboptimally visualized despite intravenous ultrasonic enhancing agent. Severe left ventricular systolic dysfunction. The septum and the apex are akinetic. Estimated LV ejection fraction approximately 20-25% by visual estimation. No left ventricular thrombus seen.   3. Severely dilated left ventricular cavity size. Abnormal septal motion consistent with right ventricular pacemaker.   4. There is mild (grade 1) left ventricular diastolic dysfunction.   5. The right ventricle is not well visualized. grossly normal right ventricular cavity size.   6. Device lead is visualized in the right heart.   7. Symmetric mitral valve leaflet tethering.   8. Mild to moderate mitral regurgitation.   9. Aortic valve was not well visualized.  10. Trileaflet aortic valve.  11. Mild-to-moderate aortic regurgitation.  12. Compared to the transthoracic echocardiogram performed on 7/15/2016 results are similar on today's study.    < end of copied text >

## 2023-05-25 ENCOUNTER — APPOINTMENT (OUTPATIENT)
Dept: VASCULAR SURGERY | Facility: HOSPITAL | Age: 83
End: 2023-05-25

## 2023-05-25 LAB
ANION GAP SERPL CALC-SCNC: 10 MMOL/L — SIGNIFICANT CHANGE UP (ref 5–17)
ANION GAP SERPL CALC-SCNC: 13 MMOL/L — SIGNIFICANT CHANGE UP (ref 5–17)
ANION GAP SERPL CALC-SCNC: 16 MMOL/L — SIGNIFICANT CHANGE UP (ref 5–17)
APTT BLD: 36.8 SEC — HIGH (ref 27.5–35.5)
APTT BLD: 50.4 SEC — HIGH (ref 27.5–35.5)
BASOPHILS # BLD AUTO: 0.01 K/UL — SIGNIFICANT CHANGE UP (ref 0–0.2)
BASOPHILS NFR BLD AUTO: 0.2 % — SIGNIFICANT CHANGE UP (ref 0–2)
BLD GP AB SCN SERPL QL: POSITIVE — SIGNIFICANT CHANGE UP
BUN SERPL-MCNC: 10 MG/DL — SIGNIFICANT CHANGE UP (ref 7–23)
BUN SERPL-MCNC: 12 MG/DL — SIGNIFICANT CHANGE UP (ref 7–23)
BUN SERPL-MCNC: 14 MG/DL — SIGNIFICANT CHANGE UP (ref 7–23)
CALCIUM SERPL-MCNC: 8.2 MG/DL — LOW (ref 8.4–10.5)
CALCIUM SERPL-MCNC: 8.4 MG/DL — SIGNIFICANT CHANGE UP (ref 8.4–10.5)
CALCIUM SERPL-MCNC: 8.7 MG/DL — SIGNIFICANT CHANGE UP (ref 8.4–10.5)
CHLORIDE SERPL-SCNC: 101 MMOL/L — SIGNIFICANT CHANGE UP (ref 96–108)
CHLORIDE SERPL-SCNC: 103 MMOL/L — SIGNIFICANT CHANGE UP (ref 96–108)
CHLORIDE SERPL-SCNC: 106 MMOL/L — SIGNIFICANT CHANGE UP (ref 96–108)
CO2 SERPL-SCNC: 20 MMOL/L — LOW (ref 22–31)
CO2 SERPL-SCNC: 21 MMOL/L — LOW (ref 22–31)
CO2 SERPL-SCNC: 23 MMOL/L — SIGNIFICANT CHANGE UP (ref 22–31)
CREAT SERPL-MCNC: 0.83 MG/DL — SIGNIFICANT CHANGE UP (ref 0.5–1.3)
CREAT SERPL-MCNC: 0.84 MG/DL — SIGNIFICANT CHANGE UP (ref 0.5–1.3)
CREAT SERPL-MCNC: 0.88 MG/DL — SIGNIFICANT CHANGE UP (ref 0.5–1.3)
EGFR: 85 ML/MIN/1.73M2 — SIGNIFICANT CHANGE UP
EGFR: 87 ML/MIN/1.73M2 — SIGNIFICANT CHANGE UP
EGFR: 87 ML/MIN/1.73M2 — SIGNIFICANT CHANGE UP
EOSINOPHIL # BLD AUTO: 0.02 K/UL — SIGNIFICANT CHANGE UP (ref 0–0.5)
EOSINOPHIL NFR BLD AUTO: 0.3 % — SIGNIFICANT CHANGE UP (ref 0–6)
GAS PNL BLDA: SIGNIFICANT CHANGE UP
GLUCOSE BLDC GLUCOMTR-MCNC: 147 MG/DL — HIGH (ref 70–99)
GLUCOSE SERPL-MCNC: 102 MG/DL — HIGH (ref 70–99)
GLUCOSE SERPL-MCNC: 156 MG/DL — HIGH (ref 70–99)
GLUCOSE SERPL-MCNC: 192 MG/DL — HIGH (ref 70–99)
HCT VFR BLD CALC: 32 % — LOW (ref 39–50)
HCT VFR BLD CALC: 32.5 % — LOW (ref 39–50)
HCT VFR BLD CALC: 32.7 % — LOW (ref 39–50)
HCT VFR BLD CALC: 34.8 % — LOW (ref 39–50)
HGB BLD-MCNC: 10.3 G/DL — LOW (ref 13–17)
HGB BLD-MCNC: 10.7 G/DL — LOW (ref 13–17)
HGB BLD-MCNC: 10.9 G/DL — LOW (ref 13–17)
HGB BLD-MCNC: 11.1 G/DL — LOW (ref 13–17)
IMM GRANULOCYTES NFR BLD AUTO: 1 % — HIGH (ref 0–0.9)
INR BLD: 1.16 RATIO — SIGNIFICANT CHANGE UP (ref 0.88–1.16)
INR BLD: 1.17 RATIO — HIGH (ref 0.88–1.16)
LACTATE SERPL-SCNC: 2.5 MMOL/L — HIGH (ref 0.5–2)
LYMPHOCYTES # BLD AUTO: 0.36 K/UL — LOW (ref 1–3.3)
LYMPHOCYTES # BLD AUTO: 5.8 % — LOW (ref 13–44)
MAGNESIUM SERPL-MCNC: 1.8 MG/DL — SIGNIFICANT CHANGE UP (ref 1.6–2.6)
MAGNESIUM SERPL-MCNC: 1.9 MG/DL — SIGNIFICANT CHANGE UP (ref 1.6–2.6)
MAGNESIUM SERPL-MCNC: 2.6 MG/DL — SIGNIFICANT CHANGE UP (ref 1.6–2.6)
MCHC RBC-ENTMCNC: 28.8 PG — SIGNIFICANT CHANGE UP (ref 27–34)
MCHC RBC-ENTMCNC: 29.1 PG — SIGNIFICANT CHANGE UP (ref 27–34)
MCHC RBC-ENTMCNC: 29.1 PG — SIGNIFICANT CHANGE UP (ref 27–34)
MCHC RBC-ENTMCNC: 29.3 PG — SIGNIFICANT CHANGE UP (ref 27–34)
MCHC RBC-ENTMCNC: 31.9 GM/DL — LOW (ref 32–36)
MCHC RBC-ENTMCNC: 32.2 GM/DL — SIGNIFICANT CHANGE UP (ref 32–36)
MCHC RBC-ENTMCNC: 32.9 GM/DL — SIGNIFICANT CHANGE UP (ref 32–36)
MCHC RBC-ENTMCNC: 33.3 GM/DL — SIGNIFICANT CHANGE UP (ref 32–36)
MCV RBC AUTO: 87.4 FL — SIGNIFICANT CHANGE UP (ref 80–100)
MCV RBC AUTO: 89 FL — SIGNIFICANT CHANGE UP (ref 80–100)
MCV RBC AUTO: 90.4 FL — SIGNIFICANT CHANGE UP (ref 80–100)
MCV RBC AUTO: 90.4 FL — SIGNIFICANT CHANGE UP (ref 80–100)
MONOCYTES # BLD AUTO: 0.11 K/UL — SIGNIFICANT CHANGE UP (ref 0–0.9)
MONOCYTES NFR BLD AUTO: 1.8 % — LOW (ref 2–14)
NEUTROPHILS # BLD AUTO: 5.68 K/UL — SIGNIFICANT CHANGE UP (ref 1.8–7.4)
NEUTROPHILS NFR BLD AUTO: 90.9 % — HIGH (ref 43–77)
NRBC # BLD: 0 /100 WBCS — SIGNIFICANT CHANGE UP (ref 0–0)
PHOSPHATE SERPL-MCNC: 3.2 MG/DL — SIGNIFICANT CHANGE UP (ref 2.5–4.5)
PHOSPHATE SERPL-MCNC: 4.2 MG/DL — SIGNIFICANT CHANGE UP (ref 2.5–4.5)
PHOSPHATE SERPL-MCNC: 4.2 MG/DL — SIGNIFICANT CHANGE UP (ref 2.5–4.5)
PLATELET # BLD AUTO: 149 K/UL — LOW (ref 150–400)
PLATELET # BLD AUTO: 164 K/UL — SIGNIFICANT CHANGE UP (ref 150–400)
PLATELET # BLD AUTO: 169 K/UL — SIGNIFICANT CHANGE UP (ref 150–400)
PLATELET # BLD AUTO: 176 K/UL — SIGNIFICANT CHANGE UP (ref 150–400)
POTASSIUM SERPL-MCNC: 3.9 MMOL/L — SIGNIFICANT CHANGE UP (ref 3.5–5.3)
POTASSIUM SERPL-MCNC: 4.1 MMOL/L — SIGNIFICANT CHANGE UP (ref 3.5–5.3)
POTASSIUM SERPL-MCNC: 4.2 MMOL/L — SIGNIFICANT CHANGE UP (ref 3.5–5.3)
POTASSIUM SERPL-SCNC: 3.9 MMOL/L — SIGNIFICANT CHANGE UP (ref 3.5–5.3)
POTASSIUM SERPL-SCNC: 4.1 MMOL/L — SIGNIFICANT CHANGE UP (ref 3.5–5.3)
POTASSIUM SERPL-SCNC: 4.2 MMOL/L — SIGNIFICANT CHANGE UP (ref 3.5–5.3)
PROTHROM AB SERPL-ACNC: 13.5 SEC — HIGH (ref 10.5–13.4)
PROTHROM AB SERPL-ACNC: 13.5 SEC — HIGH (ref 10.5–13.4)
RBC # BLD: 3.54 M/UL — LOW (ref 4.2–5.8)
RBC # BLD: 3.65 M/UL — LOW (ref 4.2–5.8)
RBC # BLD: 3.74 M/UL — LOW (ref 4.2–5.8)
RBC # BLD: 3.85 M/UL — LOW (ref 4.2–5.8)
RBC # FLD: 14 % — SIGNIFICANT CHANGE UP (ref 10.3–14.5)
RBC # FLD: 14.1 % — SIGNIFICANT CHANGE UP (ref 10.3–14.5)
RH IG SCN BLD-IMP: POSITIVE — SIGNIFICANT CHANGE UP
SODIUM SERPL-SCNC: 136 MMOL/L — SIGNIFICANT CHANGE UP (ref 135–145)
SODIUM SERPL-SCNC: 138 MMOL/L — SIGNIFICANT CHANGE UP (ref 135–145)
SODIUM SERPL-SCNC: 139 MMOL/L — SIGNIFICANT CHANGE UP (ref 135–145)
WBC # BLD: 11.92 K/UL — HIGH (ref 3.8–10.5)
WBC # BLD: 14.15 K/UL — HIGH (ref 3.8–10.5)
WBC # BLD: 5.03 K/UL — SIGNIFICANT CHANGE UP (ref 3.8–10.5)
WBC # BLD: 6.24 K/UL — SIGNIFICANT CHANGE UP (ref 3.8–10.5)
WBC # FLD AUTO: 11.92 K/UL — HIGH (ref 3.8–10.5)
WBC # FLD AUTO: 14.15 K/UL — HIGH (ref 3.8–10.5)
WBC # FLD AUTO: 5.03 K/UL — SIGNIFICANT CHANGE UP (ref 3.8–10.5)
WBC # FLD AUTO: 6.24 K/UL — SIGNIFICANT CHANGE UP (ref 3.8–10.5)

## 2023-05-25 PROCEDURE — 93010 ELECTROCARDIOGRAM REPORT: CPT

## 2023-05-25 PROCEDURE — 99223 1ST HOSP IP/OBS HIGH 75: CPT

## 2023-05-25 DEVICE — CLIP APPLIER COVIDIEN SURGICLIP II 9.75" MEDIUM: Type: IMPLANTABLE DEVICE | Site: BILATERAL | Status: FUNCTIONAL

## 2023-05-25 DEVICE — SHEATH INTRODUCER TERUMO PINNACLE PERIPHERAL 6FR X 10CM: Type: IMPLANTABLE DEVICE | Site: BILATERAL | Status: FUNCTIONAL

## 2023-05-25 DEVICE — CATH BLLN RELIANT STENT 12X10-46MM: Type: IMPLANTABLE DEVICE | Site: BILATERAL | Status: FUNCTIONAL

## 2023-05-25 DEVICE — IMPLANTABLE DEVICE: Type: IMPLANTABLE DEVICE | Site: BILATERAL | Status: FUNCTIONAL

## 2023-05-25 DEVICE — GRAFT VASC PROPATEN 7MMX60X80CM TW REMOV RING: Type: IMPLANTABLE DEVICE | Site: BILATERAL | Status: FUNCTIONAL

## 2023-05-25 DEVICE — ARISTA 3GR: Type: IMPLANTABLE DEVICE | Site: BILATERAL | Status: FUNCTIONAL

## 2023-05-25 DEVICE — SHEATH INTRODUCER TERUMO PINNACLE PERIPHERAL 11FR X 10CM: Type: IMPLANTABLE DEVICE | Site: BILATERAL | Status: FUNCTIONAL

## 2023-05-25 DEVICE — SHEATH INTRODUCER CORDIS BRITE TIP 6FR X 23CM (GREEN): Type: IMPLANTABLE DEVICE | Site: BILATERAL | Status: FUNCTIONAL

## 2023-05-25 DEVICE — CATH PERFORMA PIGTAIL 5FX100CM: Type: IMPLANTABLE DEVICE | Site: BILATERAL | Status: FUNCTIONAL

## 2023-05-25 DEVICE — CATH ANG PIGVSC 0.035IN 5FR: Type: IMPLANTABLE DEVICE | Site: BILATERAL | Status: FUNCTIONAL

## 2023-05-25 DEVICE — GUIDEWIRE TERUMO GLIDEWIRE ANGLED .035" X 150CM STANDARD: Type: IMPLANTABLE DEVICE | Site: BILATERAL | Status: FUNCTIONAL

## 2023-05-25 DEVICE — INTRODUCER  SENTRANT SHEATH WITH HYDRO COATING: Type: IMPLANTABLE DEVICE | Site: BILATERAL | Status: FUNCTIONAL

## 2023-05-25 DEVICE — GUIDEWIRE LUNDERQUIST EXTRA-STIFF STRAIGHT 0.035" X 260CM: Type: IMPLANTABLE DEVICE | Site: BILATERAL | Status: FUNCTIONAL

## 2023-05-25 DEVICE — CATH ANGIO GLIDECATH ANGLE TAPER 5FR X 65CM: Type: IMPLANTABLE DEVICE | Site: BILATERAL | Status: FUNCTIONAL

## 2023-05-25 DEVICE — KIT A-LINE 1LUM 20G X 12CM SAFE KIT: Type: IMPLANTABLE DEVICE | Site: BILATERAL | Status: FUNCTIONAL

## 2023-05-25 DEVICE — CLIP APPLIER COVIDIEN SURGICLIP III 9" SM: Type: IMPLANTABLE DEVICE | Site: BILATERAL | Status: FUNCTIONAL

## 2023-05-25 DEVICE — GUIDEWIRE BENTSON 0.035" X 145CM: Type: IMPLANTABLE DEVICE | Site: BILATERAL | Status: FUNCTIONAL

## 2023-05-25 DEVICE — SHEATH INTRODUCER TERUMO PINNACLE PERIPHERAL 5FR X 10CM: Type: IMPLANTABLE DEVICE | Site: BILATERAL | Status: FUNCTIONAL

## 2023-05-25 DEVICE — SURGIFOAM PAD 8CM X 12.5CM X 10MM (100): Type: IMPLANTABLE DEVICE | Site: BILATERAL | Status: FUNCTIONAL

## 2023-05-25 DEVICE — AMPLATZER VASCULAR PLUG II AVP II 12MM X 9MM: Type: IMPLANTABLE DEVICE | Site: BILATERAL | Status: FUNCTIONAL

## 2023-05-25 RX ORDER — PANTOPRAZOLE SODIUM 20 MG/1
40 TABLET, DELAYED RELEASE ORAL
Refills: 0 | Status: DISCONTINUED | OUTPATIENT
Start: 2023-05-25 | End: 2023-05-28

## 2023-05-25 RX ORDER — SODIUM CHLORIDE 9 MG/ML
500 INJECTION, SOLUTION INTRAVENOUS ONCE
Refills: 0 | Status: COMPLETED | OUTPATIENT
Start: 2023-05-25 | End: 2023-05-25

## 2023-05-25 RX ORDER — MAGNESIUM SULFATE 500 MG/ML
1 VIAL (ML) INJECTION ONCE
Refills: 0 | Status: DISCONTINUED | OUTPATIENT
Start: 2023-05-25 | End: 2023-05-25

## 2023-05-25 RX ORDER — LISINOPRIL 2.5 MG/1
10 TABLET ORAL DAILY
Refills: 0 | Status: DISCONTINUED | OUTPATIENT
Start: 2023-05-25 | End: 2023-05-25

## 2023-05-25 RX ORDER — INSULIN LISPRO 100/ML
VIAL (ML) SUBCUTANEOUS AT BEDTIME
Refills: 0 | Status: DISCONTINUED | OUTPATIENT
Start: 2023-05-25 | End: 2023-05-28

## 2023-05-25 RX ORDER — ACETAMINOPHEN 500 MG
650 TABLET ORAL EVERY 6 HOURS
Refills: 0 | Status: DISCONTINUED | OUTPATIENT
Start: 2023-05-25 | End: 2023-05-28

## 2023-05-25 RX ORDER — GLUCAGON INJECTION, SOLUTION 0.5 MG/.1ML
1 INJECTION, SOLUTION SUBCUTANEOUS ONCE
Refills: 0 | Status: DISCONTINUED | OUTPATIENT
Start: 2023-05-25 | End: 2023-05-26

## 2023-05-25 RX ORDER — DEXTROSE 50 % IN WATER 50 %
15 SYRINGE (ML) INTRAVENOUS ONCE
Refills: 0 | Status: DISCONTINUED | OUTPATIENT
Start: 2023-05-25 | End: 2023-05-26

## 2023-05-25 RX ORDER — ATORVASTATIN CALCIUM 80 MG/1
80 TABLET, FILM COATED ORAL AT BEDTIME
Refills: 0 | Status: DISCONTINUED | OUTPATIENT
Start: 2023-05-25 | End: 2023-05-28

## 2023-05-25 RX ORDER — FUROSEMIDE 40 MG
20 TABLET ORAL DAILY
Refills: 0 | Status: DISCONTINUED | OUTPATIENT
Start: 2023-05-25 | End: 2023-05-25

## 2023-05-25 RX ORDER — SODIUM CHLORIDE 9 MG/ML
1000 INJECTION, SOLUTION INTRAVENOUS
Refills: 0 | Status: DISCONTINUED | OUTPATIENT
Start: 2023-05-25 | End: 2023-05-26

## 2023-05-25 RX ORDER — ENOXAPARIN SODIUM 100 MG/ML
40 INJECTION SUBCUTANEOUS EVERY 24 HOURS
Refills: 0 | Status: DISCONTINUED | OUTPATIENT
Start: 2023-05-26 | End: 2023-05-28

## 2023-05-25 RX ORDER — ASPIRIN/CALCIUM CARB/MAGNESIUM 324 MG
81 TABLET ORAL DAILY
Refills: 0 | Status: DISCONTINUED | OUTPATIENT
Start: 2023-05-25 | End: 2023-05-28

## 2023-05-25 RX ORDER — ONDANSETRON 8 MG/1
4 TABLET, FILM COATED ORAL ONCE
Refills: 0 | Status: DISCONTINUED | OUTPATIENT
Start: 2023-05-25 | End: 2023-05-25

## 2023-05-25 RX ORDER — SODIUM CHLORIDE 9 MG/ML
1000 INJECTION, SOLUTION INTRAVENOUS
Refills: 0 | Status: DISCONTINUED | OUTPATIENT
Start: 2023-05-25 | End: 2023-05-27

## 2023-05-25 RX ORDER — CARVEDILOL PHOSPHATE 80 MG/1
3.12 CAPSULE, EXTENDED RELEASE ORAL EVERY 12 HOURS
Refills: 0 | Status: DISCONTINUED | OUTPATIENT
Start: 2023-05-25 | End: 2023-05-25

## 2023-05-25 RX ORDER — OXYCODONE HYDROCHLORIDE 5 MG/1
10 TABLET ORAL ONCE
Refills: 0 | Status: DISCONTINUED | OUTPATIENT
Start: 2023-05-25 | End: 2023-05-25

## 2023-05-25 RX ORDER — ISOSORBIDE MONONITRATE 60 MG/1
30 TABLET, EXTENDED RELEASE ORAL DAILY
Refills: 0 | Status: DISCONTINUED | OUTPATIENT
Start: 2023-05-25 | End: 2023-05-25

## 2023-05-25 RX ORDER — INSULIN LISPRO 100/ML
VIAL (ML) SUBCUTANEOUS
Refills: 0 | Status: DISCONTINUED | OUTPATIENT
Start: 2023-05-25 | End: 2023-05-28

## 2023-05-25 RX ORDER — OXYCODONE HYDROCHLORIDE 5 MG/1
2.5 TABLET ORAL EVERY 4 HOURS
Refills: 0 | Status: DISCONTINUED | OUTPATIENT
Start: 2023-05-25 | End: 2023-05-28

## 2023-05-25 RX ORDER — DEXTROSE 50 % IN WATER 50 %
12.5 SYRINGE (ML) INTRAVENOUS ONCE
Refills: 0 | Status: DISCONTINUED | OUTPATIENT
Start: 2023-05-25 | End: 2023-05-26

## 2023-05-25 RX ORDER — OXYCODONE HYDROCHLORIDE 5 MG/1
5 TABLET ORAL EVERY 4 HOURS
Refills: 0 | Status: DISCONTINUED | OUTPATIENT
Start: 2023-05-25 | End: 2023-05-28

## 2023-05-25 RX ORDER — FENTANYL CITRATE 50 UG/ML
25 INJECTION INTRAVENOUS
Refills: 0 | Status: DISCONTINUED | OUTPATIENT
Start: 2023-05-25 | End: 2023-05-25

## 2023-05-25 RX ORDER — DEXTROSE 50 % IN WATER 50 %
25 SYRINGE (ML) INTRAVENOUS ONCE
Refills: 0 | Status: DISCONTINUED | OUTPATIENT
Start: 2023-05-25 | End: 2023-05-26

## 2023-05-25 RX ORDER — MAGNESIUM SULFATE 500 MG/ML
2 VIAL (ML) INJECTION ONCE
Refills: 0 | Status: COMPLETED | OUTPATIENT
Start: 2023-05-25 | End: 2023-05-25

## 2023-05-25 RX ORDER — IPRATROPIUM/ALBUTEROL SULFATE 18-103MCG
3 AEROSOL WITH ADAPTER (GRAM) INHALATION EVERY 6 HOURS
Refills: 0 | Status: DISCONTINUED | OUTPATIENT
Start: 2023-05-25 | End: 2023-05-28

## 2023-05-25 RX ORDER — PHENYLEPHRINE HYDROCHLORIDE 10 MG/ML
0.5 INJECTION INTRAVENOUS
Qty: 40 | Refills: 0 | Status: DISCONTINUED | OUTPATIENT
Start: 2023-05-25 | End: 2023-05-25

## 2023-05-25 RX ORDER — CALCIUM GLUCONATE 100 MG/ML
1 VIAL (ML) INTRAVENOUS ONCE
Refills: 0 | Status: COMPLETED | OUTPATIENT
Start: 2023-05-25 | End: 2023-05-25

## 2023-05-25 RX ORDER — PHENYLEPHRINE HYDROCHLORIDE 10 MG/ML
0.7 INJECTION INTRAVENOUS
Qty: 40 | Refills: 0 | Status: DISCONTINUED | OUTPATIENT
Start: 2023-05-25 | End: 2023-05-27

## 2023-05-25 RX ADMIN — PANTOPRAZOLE SODIUM 40 MILLIGRAM(S): 20 TABLET, DELAYED RELEASE ORAL at 05:24

## 2023-05-25 RX ADMIN — SODIUM CHLORIDE 100 MILLILITER(S): 9 INJECTION, SOLUTION INTRAVENOUS at 00:00

## 2023-05-25 RX ADMIN — Medication 25 GRAM(S): at 16:04

## 2023-05-25 RX ADMIN — Medication 20 MILLIGRAM(S): at 05:24

## 2023-05-25 RX ADMIN — SODIUM CHLORIDE 500 MILLILITER(S): 9 INJECTION, SOLUTION INTRAVENOUS at 12:59

## 2023-05-25 RX ADMIN — Medication 25 GRAM(S): at 13:19

## 2023-05-25 RX ADMIN — ATORVASTATIN CALCIUM 80 MILLIGRAM(S): 80 TABLET, FILM COATED ORAL at 21:30

## 2023-05-25 RX ADMIN — HEPARIN SODIUM 5000 UNIT(S): 5000 INJECTION INTRAVENOUS; SUBCUTANEOUS at 05:24

## 2023-05-25 RX ADMIN — SODIUM CHLORIDE 50 MILLILITER(S): 9 INJECTION, SOLUTION INTRAVENOUS at 21:30

## 2023-05-25 RX ADMIN — PHENYLEPHRINE HYDROCHLORIDE 10.7 MICROGRAM(S)/KG/MIN: 10 INJECTION INTRAVENOUS at 15:06

## 2023-05-25 RX ADMIN — SODIUM CHLORIDE 50 MILLILITER(S): 9 INJECTION, SOLUTION INTRAVENOUS at 15:07

## 2023-05-25 RX ADMIN — Medication 100 GRAM(S): at 13:19

## 2023-05-25 RX ADMIN — SODIUM CHLORIDE 1000 MILLILITER(S): 9 INJECTION, SOLUTION INTRAVENOUS at 23:04

## 2023-05-25 NOTE — CONSULT NOTE ADULT - ASSESSMENT
83-year-old gentleman PMH HTN, HLD, CM, CAD ( S/P 3 vessel CABG 2002), PCI to RCA 4/11/16, life vest x 3 months, former smoker ( 80 pack years),  EF on ECHO 20-25%, AICD  nows s/p EVAR with fem-fem bypass requiring vasopressor support with phenylephrine 0.3 to maintain MAP within goal of 65-70mmHg. SICU consulted for hemodynamic support with vasopressors.      NEUROLOGIC   - Pain control: oxycodone, tylenol   - AOx3    RESPIRATORY   - Monitor SpO2 goal >92%  - Incentive spirometry   - RA    CARDIOVASCULAR   - Slick 0.4-->0.3  - Monitor hemodynamics   - Maintain MAP 65-70  - Wean pressors as tolerated  - Hold home blood pressure medications while on pressors    GASTROINTESTINAL   - Diet: DASH diet (restrictions as per order)  - Monitor bowel function    /RENAL   - IV fluids: LR @ 50cc/hr  - Strict I/Os  - Monitor BMP qd  - strict Is/Os  - Monitor electrolytes, replete PRN  - Hold home lasix 20 while on pressors    HEMATOLOGIC  - Monitor H/H   - DVT ppx: Lovenox  - ASA 81 qd    INFECTIOUS DISEASE  - Monitor fever / WBC  - No antibiotics    ENDOCRINE  - Monitor gluc    LINES  - PIV     DISPO: SICU

## 2023-05-25 NOTE — PRE-ANESTHESIA EVALUATION ADULT - NSANTHPEFT_GEN_ALL_CORE
PHYSICAL EXAM:  GENERAL: NAD, well-developed  CHEST/LUNG: Grossly clear  HEART: Regular rate  PSYCH/NEURO: AAOx3

## 2023-05-25 NOTE — BRIEF OPERATIVE NOTE - NSICDXBRIEFPROCEDURE_GEN_ALL_CORE_FT
PROCEDURES:  Endovascular repair of unruptured aortoiliac vessel using [id number] stent graft 25-May-2023 19:40:40  Alirio Reyes

## 2023-05-25 NOTE — PRE-OP CHECKLIST - IV STARTED
in place from 9monti/yes Deteriorating patient status - Patient was clinically upgraded due to deteriorating patient status.

## 2023-05-25 NOTE — CONSULT NOTE ADULT - ATTENDING COMMENTS
83-year-old gentleman PMH HTN, HLD, CM, CAD ( S/P 3 vessel CABG 2002), PCI to RCA 4/11/16, life vest x 3 months, former smoker ( 80 pack years),  EF on ECHO 20-25%, AICD  nows s/p EVAR with fem-fem bypass requiring vasopressor support with phenylephrine 0.3 to maintain MAP within goal of 65-70mmHg.     Evaluated in multidisciplinary round in SICU. in SICU  Awake and alert, pain control with Tylenol and Oxy  Saturating well on RA, on Duoneb for pul clearance in view of h/o smoking  Worsening of hypotension with increasing vasopressor requirement,  lactate with oliguria, POCUS consistent with intravascular depletion.. A conservative fluid bolus ordered in view of HF with EF 20%. Continue to monitor lactate, I/O, watch for fluid overload.  PO  B/L LE warm, continue vascular checks  Not on abx  Start ISS  Hb stable 10.6, start pharm DVT ppx with sq Heparin, change to Lovenox    Spoke to his daughter

## 2023-05-25 NOTE — PROGRESS NOTE ADULT - SUBJECTIVE AND OBJECTIVE BOX
Subjective: Patient seen and examined. No new events except as noted.   seen this am   for OR today     REVIEW OF SYSTEMS:    CONSTITUTIONAL: No weakness, fevers or chills  EYES/ENT: No visual changes;  No vertigo or throat pain   NECK: No pain or stiffness  RESPIRATORY: No cough, wheezing, hemoptysis; No shortness of breath  CARDIOVASCULAR: No chest pain or palpitations  GASTROINTESTINAL: No abdominal or epigastric pain. No nausea, vomiting, or hematemesis; No diarrhea or constipation. No melena or hematochezia.  GENITOURINARY: No dysuria, frequency or hematuria  NEUROLOGICAL: No numbness or weakness  SKIN: No itching, burning, rashes, or lesions   All other review of systems is negative unless indicated above.    MEDICATIONS:  MEDICATIONS  (STANDING):      PHYSICAL EXAM:  T(C): 36.4 (05-25-23 @ 07:21), Max: 36.6 (05-24-23 @ 14:01)  HR: 64 (05-25-23 @ 07:21) (56 - 76)  BP: 119/66 (05-25-23 @ 07:21) (92/57 - 119/66)  RR: 17 (05-25-23 @ 07:21) (17 - 18)  SpO2: 98% (05-25-23 @ 07:21) (96% - 99%)  Wt(kg): --  I&O's Summary    24 May 2023 07:01  -  25 May 2023 07:00  --------------------------------------------------------  IN: 720 mL / OUT: 2350 mL / NET: -1630 mL      Height (cm): 167.6 (05-25 @ 07:21)  Weight (kg): 57 (05-25 @ 07:21)  BMI (kg/m2): 20.3 (05-25 @ 07:21)  BSA (m2): 1.64 (05-25 @ 07:21)    Appearance: Normal	  HEENT:   Normal oral mucosa, PERRL, EOMI	  Lymphatic: No lymphadenopathy , no edema  Cardiovascular: Normal S1 S2, No JVD, No murmurs , Peripheral pulses palpable 2+ bilaterally  Respiratory: Lungs clear to auscultation, normal effort 	  Gastrointestinal:  Soft, Non-tender, + BS	  Skin: No rashes, No ecchymoses, No cyanosis, warm to touch  Musculoskeletal: Normal range of motion, normal strength  Psychiatry:  Mood & affect appropriate  Ext: No edema      LABS:    CARDIAC MARKERS:                                11.1   5.03  )-----------( 164      ( 25 May 2023 01:52 )             34.8     05-25    139  |  106  |  14  ----------------------------<  102<H>  4.1   |  23  |  0.88    Ca    8.7      25 May 2023 01:52  Phos  3.2     05-25  Mg     1.9     05-25          TELEMETRY: 	    ECG:  	  RADIOLOGY:   DIAGNOSTIC TESTING:  [ ] Echocardiogram:  [ ]  Catheterization:  [ ] Stress Test:    OTHER:

## 2023-05-25 NOTE — CONSULT NOTE ADULT - REASON FOR ADMISSION
elective AAA 7.1cm repair

## 2023-05-25 NOTE — PRE-ANESTHESIA EVALUATION ADULT - NSANTHPMHFT_GEN_ALL_CORE
83M pmhx HTN, HLD, CAD s/p CABG, PCI to RCA, 80py smoker, AICD, lung nodule presenting for elective endovascular repair of AAA. Patient denies chest pain, SOB, exercise intolerance. Cardiac workup reviewedPlan for general anesthesia.

## 2023-05-25 NOTE — CONSULT NOTE ADULT - SUBJECTIVE AND OBJECTIVE BOX
83-year-old gentleman PMH HTN, HLD, CM, CAD ( S/P 3 vessel CABG 2002), PCI to RCA 4/11/16, life vest x 3 months, former smoker ( 80 pack years),  AICD presented to the Dr. Tellez's office on 5/17/2032 with a report of a large abdominal aortic aneurysm. The patient's granddaughter stated that several weeks ago the patient was lying down and he felt a pulsatile mass in his abdomen. Patient went a duplex study which showed an 8.5 cm abdominal aortic aneurysm. Patient does not have complaints of abdominal or back pain. CTA outpatient on 5/19/2023 showed  infrarenal abdominal aortic aneurysm measuring 7.1 cm in diameter containing a large amount of mural thrombus. Patient presented to ER today for preop monitor and optimization.     Recent ECHO 5/22/23 with EF 20-25%.    Patient is now s/p EVAR with fem-fem bypass requiring vasopressor support with phenylephrine 0.4-->0.3 in PACU to maintain MAP within goal of 65-70mmHg. MAPs at 60s in PACU.    SICU consulted for hemodynamic support with vasopressors.      PAST MEDICAL & SURGICAL HISTORY:  Hypertension      HLD (hyperlipidemia)      Systolic dysfunction      BEE (dyspnea on exertion)      Former cigarette smoker      S/P CABG x 3  s/p NAWAF procedure (LV endoaneurysmorraphy      Stented coronary artery  PCI RCA 4/2016      AICD (automatic cardioverter/defibrillator) present      ICU Vital Signs Last 24 Hrs  T(C): 36.1 (25 May 2023 18:00), Max: 36.6 (25 May 2023 12:05)  T(F): 97 (25 May 2023 18:00), Max: 97.9 (25 May 2023 12:05)  HR: 60 (25 May 2023 19:00) (56 - 101)  BP: 110/56 (25 May 2023 19:00) (70/34 - 135/53)  BP(mean): 81 (25 May 2023 19:00) (46 - 85)  ABP: 115/40 (25 May 2023 19:00) (82/45 - 130/56)  ABP(mean): 65 (25 May 2023 19:00) (51 - 84)  RR: 18 (25 May 2023 19:00) (16 - 18)  SpO2: 100% (25 May 2023 19:00) (95% - 100%)    O2 Parameters below as of 25 May 2023 16:00  Patient On (Oxygen Delivery Method): room air    PHYSICAL EXAM:  Constitutional: NAD, lying flat  Neuro: AOx3  Respiratory:  Normal respiratory effort on RA  Cardiovascular: Warm and well-perfused. EKG bedside shows mild ectopy.  Gastrointestinal: Abdomen soft, non distended, non tender  : Soft, Aquacell dressing c/d/i.    Extremities: No edema, no calf or thigh tenderness.  Vascular: B/l Palp PT, Dopplerable DPs      LABS:                           10.3   6.24  )-----------( 149      ( 25 May 2023 12:58 )             32.0     05-25    138  |  101  |  10  ----------------------------<  156<H>  4.2   |  21<L>  |  0.83    Ca    8.2<L>      25 May 2023 12:58  Phos  4.2     05-25  Mg     1.8     05-25      PT/INR - ( 25 May 2023 01:52 )   PT: 13.5 sec;   INR: 1.17 ratio         PTT - ( 25 May 2023 01:52 )  PTT:50.4 sec      MEDICATIONS  (STANDING):  albuterol/ipratropium for Nebulization 3 milliLiter(s) Nebulizer every 6 hours  aspirin  chewable 81 milliGRAM(s) Oral daily  atorvastatin 80 milliGRAM(s) Oral at bedtime  carvedilol 3.125 milliGRAM(s) Oral every 12 hours  furosemide    Tablet 20 milliGRAM(s) Oral daily  isosorbide   mononitrate ER Tablet (IMDUR) 30 milliGRAM(s) Oral daily  lactated ringers. 1000 milliLiter(s) (50 mL/Hr) IV Continuous <Continuous>  lisinopril 10 milliGRAM(s) Oral daily  pantoprazole    Tablet 40 milliGRAM(s) Oral before breakfast  phenylephrine    Infusion 0.5 MICROgram(s)/kG/Min (10.7 mL/Hr) IV Continuous <Continuous>    MEDICATIONS  (PRN):  fentaNYL    Injectable 25 MICROGram(s) IV Push every 5 minutes PRN Moderate Pain (4 - 6)  ondansetron Injectable 4 milliGRAM(s) IV Push once PRN Nausea and/or Vomiting  oxyCODONE    IR 10 milliGRAM(s) Oral once PRN Severe Pain (7 - 10)      Home Medications:  acetaminophen 325 mg oral tablet: 2 tab(s) orally every 6 hours, As needed, Mild Pain (1 - 3) (21 May 2023 21:50)  atorvastatin 80 mg oral tablet: 1 tab(s) orally once a day (at bedtime) (21 May 2023 21:50)  carvedilol 3.125 mg oral tablet: 1 tab(s) orally 2 times a day (21 May 2023 21:50)  ezetimibe 10 mg oral tablet: 1 orally once a day (21 May 2023 21:46)  fluticasone 50 mcg inhalation powder: 1 puff(s) inhaled 2 times a day (21 May 2023 21:50)  furosemide 20 mg oral tablet: 1 tab(s) orally once a day (21 May 2023 21:50)  isosorbide mononitrate 30 mg oral tablet, extended release: 1 orally once a day (21 May 2023 21:47)  lisinopril 10 mg oral tablet: 1 orally once a day (21 May 2023 21:45)  omega-3 polyunsaturated fatty acids 500 mg oral capsule: 1 orally once a day (21 May 2023 21:49)  omeprazole 40 mg oral delayed release capsule: 1 cap(s) orally once a day (21 May 2023 21:50)  oxyBUTYnin 5 mg oral tablet: 1 orally 2 times a day (21 May 2023 21:47)          TRANSTHORACIC ECHOCARDIOGRAM REPORT  ________________________________________________________________________________                                      _______       Pt. Name:       CTAHY MARTINES    Study Date:    5/22/2023  MRN:            [id number]   YOB: 1940  Accession #:    [id number]    Age:           83 years  Account#:       [id number] Gender:        M  Heart Rate:                  Height:        65.00 in (165.10 cm)  Rhythm:                      Weight:        125.00 lb(56.70 kg)  Blood Pressure: 104/62 mmHg  BSA/BMI:       1.62 m² / 20.80 kg/m²  ________________________________________________________________________________________  Referring Physician:    [id number] Raoul Tellez  Interpreting Physician: Miriam Milligan MD  Primary Sonographer:    [facility]    CPT:                ECHO TTE WITH CON COMP W DOPP - .m; DEFINITY ECHO                      CONTRAST PER ML - .m  Indication(s):      Cardiac murmur, unspecified - R01.1  Procedure:          Transthoracic echocardiogram with 2-D, M-mode and complete                      spectral and color flow Doppler.  Ordering Location:  [facility]  Admission Status:   Inpatient  Contrast Injection: Verbal consent was obtained for injection of Ultrasonic                      Enhancing Agent following a discussion of risks and                      benefits.                      Endocardial visualization enhanced with 2 ml of Definity                      Ultrasound enhancing agent (Lot#:[id number] Exp.Date:[id number]                      Discarded Dose:8ml).  UEA Reaction:       Patient had no adverse reaction after injection of                      Ultrasound Enhancing Agent.  Study Information:  Image quality for this study is technically difficult.    _______________________________________________________________________________________  CONCLUSIONS:      1. Technically difficult image quality.   2. Left ventricle suboptimally visualized despite intravenous ultrasonic enhancing agent. Severe left ventricular systolic dysfunction. The septum and the apex are akinetic. Estimated LV ejection fraction approximately 20-25% by visual estimation. No left ventricular thrombus seen.   3. Severely dilated left ventricular cavity size. Abnormal septal motion consistent with right ventricular pacemaker.   4. There is mild (grade 1) left ventricular diastolic dysfunction.   5. The right ventricle is not well visualized. grossly normal right ventricular cavity size.   6. Device lead is visualized in the right heart.   7. Symmetric mitral valve leaflet tethering.   8. Mild to moderate mitral regurgitation.   9. Aortic valve was not well visualized.  10. Trileaflet aortic valve.  11. Mild-to-moderate aortic regurgitation.  12. Compared to the transthoracic echocardiogram performed on 7/15/2016 results are similar on today's study.    ________________________________________________________________________________________  FINDINGS:     Left Ventricle:  Severely dilated left ventricular cavitysize. The left ventricular wall thickness is normal. Abnormal (paradoxical) septal motion consistent with right ventricular pacemaker. There is mild (grade 1) left ventricular diastolic dysfunction. There is increased LV mass and eccentric hypertrophy. Left ventricle suboptimally visualized despite intravenous ultrasonic enhancing agent. Severe left ventricular systolic dysfunction. The septum and the apex are akinetic. Estimated LV ejection fraction approximately 20-25% by visual estimation. No left ventricular thrombus seen.     Right Ventricle:  The right ventricle is not well visualized. Grossly normal right ventricular cavity size. A device lead is visualized in the right heart.     Left Atrium:  The left atrium is moderately dilated.     Right Atrium:  The right atrium is normal with an indexed volume of 21.61 ml/m².     Aortic Valve:  The aortic valve was not well visualized. The aortic valve appears trileaflet. There is mild-to-moderate aortic regurgitation. AI VMax is 3.53 m/s. AI pressure half time is 661 msec. AI slope is 1.33 m/s².     Mitral Valve:  There is mild calcification of the mitral valve annulus. There is symmetric leaflet tethering. There is mild to moderate mitral regurgitation.     Tricuspid Valve:  Structurally normal tricuspid valve with normal leaflet excursion. There is trace tricuspid regurgitation. There is insufficient tricuspid regurgitation detected to calculate pulmonary artery systolic pressure.     Pulmonic Valve:  Structurally normal pulmonic valve with normal leaflet excursion.     Aorta:  The aortic annulus and aortic root appear normal in size.     Pericardium:  No pericardial effusion seen.     Systemic Veins:  The inferior vena cava was not well visualized. The inferior vena cava is dilated (dilated >2.1cm) with normal inspiratory collapse (normal >50%) consistent with mildly elevated right atrial pressure (~8, range 5-10mmHg).  ____________________________________________________________________  QUANTITATIVE DATA  Left VentricleMeasurements                         Indexed BSA  IVSd (2D):   0.5 cm  LVPWd (2D):  0.5 cm  LVIDd (2D):  8.1 cm  LVIDs (2D):  7.5 cm  LV Mass:     192 g  118.4 g/m²     MV E Vmax:    0.38 m/s  MV A Vmax:    0.91 m/s  MV E/A:       0.42  e' lateral:9.90 cm/s  e' medial:    4.24 cm/s  E/e' lateral: 3.81  E/e' medial:  8.89  E/e' Average: 5.33    Aorta Measurements     Ao Sinus: 3.1 cm       Left Atrium Measurements     LA Diam 2D: 4.70 cm    Right Atrial Measurements     RA Vol:       35.00 ml  RA Vol Index: 21.61 ml/m²       LVOT / RVOT/ Qp/Qs Data:  LVOT Diameter:   2.10 cm  LVOT Vmax:       0.64 m/s  LVOT VTI:        15.10 cm  LVOT SV:         52.3 ml  LVOT SV Indexed: 32.28 ml/m²    Aortic Valve Measurements     AV Vmax:          170.0 cm/s  AV Peak Gradient: 11.6 mmHg  AV Mean Gradient: 6.0 mmHg    Mitral Valve Measurements     MV E Vmax: 0.4 m/s  MV A Vmax: 0.9 m/s  MV E/A:    0.4       Tricuspid Valve Measurements     RA Pressure: 8 mmHg    ________________________________________________________________________________________  Diagnosing Physician: Miriam Milligan MD.  Electronically signed on 5/22/2023 at 2:26:01 PM by Miriam Milligan MD         *** Final ***

## 2023-05-26 LAB
A1C WITH ESTIMATED AVERAGE GLUCOSE RESULT: 5.6 % — SIGNIFICANT CHANGE UP (ref 4–5.6)
ANION GAP SERPL CALC-SCNC: 11 MMOL/L — SIGNIFICANT CHANGE UP (ref 5–17)
ANION GAP SERPL CALC-SCNC: 11 MMOL/L — SIGNIFICANT CHANGE UP (ref 5–17)
ANION GAP SERPL CALC-SCNC: 12 MMOL/L — SIGNIFICANT CHANGE UP (ref 5–17)
APTT BLD: 30.5 SEC — SIGNIFICANT CHANGE UP (ref 27.5–35.5)
APTT BLD: 31.9 SEC — SIGNIFICANT CHANGE UP (ref 27.5–35.5)
BUN SERPL-MCNC: 12 MG/DL — SIGNIFICANT CHANGE UP (ref 7–23)
BUN SERPL-MCNC: 14 MG/DL — SIGNIFICANT CHANGE UP (ref 7–23)
BUN SERPL-MCNC: 14 MG/DL — SIGNIFICANT CHANGE UP (ref 7–23)
CALCIUM SERPL-MCNC: 7.7 MG/DL — LOW (ref 8.4–10.5)
CALCIUM SERPL-MCNC: 8.2 MG/DL — LOW (ref 8.4–10.5)
CALCIUM SERPL-MCNC: 8.4 MG/DL — SIGNIFICANT CHANGE UP (ref 8.4–10.5)
CHLORIDE SERPL-SCNC: 104 MMOL/L — SIGNIFICANT CHANGE UP (ref 96–108)
CO2 SERPL-SCNC: 20 MMOL/L — LOW (ref 22–31)
CREAT SERPL-MCNC: 0.75 MG/DL — SIGNIFICANT CHANGE UP (ref 0.5–1.3)
CREAT SERPL-MCNC: 0.77 MG/DL — SIGNIFICANT CHANGE UP (ref 0.5–1.3)
CREAT SERPL-MCNC: 0.8 MG/DL — SIGNIFICANT CHANGE UP (ref 0.5–1.3)
EGFR: 88 ML/MIN/1.73M2 — SIGNIFICANT CHANGE UP
EGFR: 89 ML/MIN/1.73M2 — SIGNIFICANT CHANGE UP
EGFR: 90 ML/MIN/1.73M2 — SIGNIFICANT CHANGE UP
ESTIMATED AVERAGE GLUCOSE: 114 MG/DL — SIGNIFICANT CHANGE UP (ref 68–114)
GAS PNL BLDA: SIGNIFICANT CHANGE UP
GLUCOSE BLDC GLUCOMTR-MCNC: 138 MG/DL — HIGH (ref 70–99)
GLUCOSE BLDC GLUCOMTR-MCNC: 138 MG/DL — HIGH (ref 70–99)
GLUCOSE BLDC GLUCOMTR-MCNC: 153 MG/DL — HIGH (ref 70–99)
GLUCOSE BLDC GLUCOMTR-MCNC: 183 MG/DL — HIGH (ref 70–99)
GLUCOSE SERPL-MCNC: 129 MG/DL — HIGH (ref 70–99)
GLUCOSE SERPL-MCNC: 129 MG/DL — HIGH (ref 70–99)
GLUCOSE SERPL-MCNC: 169 MG/DL — HIGH (ref 70–99)
GLUCOSE SERPL-MCNC: 193 MG/DL — HIGH (ref 70–99)
HCT VFR BLD CALC: 24.3 % — LOW (ref 39–50)
HCT VFR BLD CALC: 29.7 % — LOW (ref 39–50)
HCT VFR BLD CALC: 30.6 % — LOW (ref 39–50)
HGB BLD-MCNC: 10.1 G/DL — LOW (ref 13–17)
HGB BLD-MCNC: 7.6 G/DL — LOW (ref 13–17)
HGB BLD-MCNC: 9.8 G/DL — LOW (ref 13–17)
INR BLD: 1.21 RATIO — HIGH (ref 0.88–1.16)
INR BLD: 1.21 RATIO — HIGH (ref 0.88–1.16)
MAGNESIUM SERPL-MCNC: 1.8 MG/DL — SIGNIFICANT CHANGE UP (ref 1.6–2.6)
MAGNESIUM SERPL-MCNC: 2 MG/DL — SIGNIFICANT CHANGE UP (ref 1.6–2.6)
MAGNESIUM SERPL-MCNC: 2.2 MG/DL — SIGNIFICANT CHANGE UP (ref 1.6–2.6)
MCHC RBC-ENTMCNC: 28.7 PG — SIGNIFICANT CHANGE UP (ref 27–34)
MCHC RBC-ENTMCNC: 29.2 PG — SIGNIFICANT CHANGE UP (ref 27–34)
MCHC RBC-ENTMCNC: 29.2 PG — SIGNIFICANT CHANGE UP (ref 27–34)
MCHC RBC-ENTMCNC: 31.3 GM/DL — LOW (ref 32–36)
MCHC RBC-ENTMCNC: 33 GM/DL — SIGNIFICANT CHANGE UP (ref 32–36)
MCHC RBC-ENTMCNC: 33 GM/DL — SIGNIFICANT CHANGE UP (ref 32–36)
MCV RBC AUTO: 88.4 FL — SIGNIFICANT CHANGE UP (ref 80–100)
MCV RBC AUTO: 88.4 FL — SIGNIFICANT CHANGE UP (ref 80–100)
MCV RBC AUTO: 91.7 FL — SIGNIFICANT CHANGE UP (ref 80–100)
NRBC # BLD: 0 /100 WBCS — SIGNIFICANT CHANGE UP (ref 0–0)
PHOSPHATE SERPL-MCNC: 2.6 MG/DL — SIGNIFICANT CHANGE UP (ref 2.5–4.5)
PHOSPHATE SERPL-MCNC: 2.9 MG/DL — SIGNIFICANT CHANGE UP (ref 2.5–4.5)
PHOSPHATE SERPL-MCNC: 3.4 MG/DL — SIGNIFICANT CHANGE UP (ref 2.5–4.5)
PLATELET # BLD AUTO: 176 K/UL — SIGNIFICANT CHANGE UP (ref 150–400)
PLATELET # BLD AUTO: 177 K/UL — SIGNIFICANT CHANGE UP (ref 150–400)
POTASSIUM SERPL-MCNC: 3.9 MMOL/L — SIGNIFICANT CHANGE UP (ref 3.5–5.3)
POTASSIUM SERPL-MCNC: 4 MMOL/L — SIGNIFICANT CHANGE UP (ref 3.5–5.3)
POTASSIUM SERPL-MCNC: 4.1 MMOL/L — SIGNIFICANT CHANGE UP (ref 3.5–5.3)
POTASSIUM SERPL-SCNC: 3.9 MMOL/L — SIGNIFICANT CHANGE UP (ref 3.5–5.3)
POTASSIUM SERPL-SCNC: 4 MMOL/L — SIGNIFICANT CHANGE UP (ref 3.5–5.3)
POTASSIUM SERPL-SCNC: 4.1 MMOL/L — SIGNIFICANT CHANGE UP (ref 3.5–5.3)
PROTHROM AB SERPL-ACNC: 13.9 SEC — HIGH (ref 10.5–13.4)
PROTHROM AB SERPL-ACNC: 14.1 SEC — HIGH (ref 10.5–13.4)
RBC # BLD: 2.65 M/UL — LOW (ref 4.2–5.8)
RBC # BLD: 3.36 M/UL — LOW (ref 4.2–5.8)
RBC # BLD: 3.46 M/UL — LOW (ref 4.2–5.8)
RBC # FLD: 14.2 % — SIGNIFICANT CHANGE UP (ref 10.3–14.5)
RBC # FLD: 14.3 % — SIGNIFICANT CHANGE UP (ref 10.3–14.5)
RBC # FLD: 14.3 % — SIGNIFICANT CHANGE UP (ref 10.3–14.5)
SODIUM SERPL-SCNC: 135 MMOL/L — SIGNIFICANT CHANGE UP (ref 135–145)
SODIUM SERPL-SCNC: 135 MMOL/L — SIGNIFICANT CHANGE UP (ref 135–145)
SODIUM SERPL-SCNC: 136 MMOL/L — SIGNIFICANT CHANGE UP (ref 135–145)
TROPONIN T, HIGH SENSITIVITY RESULT: 19 NG/L — SIGNIFICANT CHANGE UP (ref 0–51)
WBC # BLD: 13.73 K/UL — HIGH (ref 3.8–10.5)
WBC # BLD: 14.5 K/UL — HIGH (ref 3.8–10.5)
WBC # BLD: 6.96 K/UL — SIGNIFICANT CHANGE UP (ref 3.8–10.5)
WBC # FLD AUTO: 13.73 K/UL — HIGH (ref 3.8–10.5)
WBC # FLD AUTO: 14.5 K/UL — HIGH (ref 3.8–10.5)
WBC # FLD AUTO: 6.96 K/UL — SIGNIFICANT CHANGE UP (ref 3.8–10.5)

## 2023-05-26 RX ORDER — SENNA PLUS 8.6 MG/1
2 TABLET ORAL AT BEDTIME
Refills: 0 | Status: DISCONTINUED | OUTPATIENT
Start: 2023-05-26 | End: 2023-05-28

## 2023-05-26 RX ORDER — POLYETHYLENE GLYCOL 3350 17 G/17G
17 POWDER, FOR SOLUTION ORAL EVERY 24 HOURS
Refills: 0 | Status: DISCONTINUED | OUTPATIENT
Start: 2023-05-26 | End: 2023-05-28

## 2023-05-26 RX ORDER — ALBUMIN HUMAN 25 %
50 VIAL (ML) INTRAVENOUS ONCE
Refills: 0 | Status: COMPLETED | OUTPATIENT
Start: 2023-05-26 | End: 2023-05-26

## 2023-05-26 RX ADMIN — OXYCODONE HYDROCHLORIDE 5 MILLIGRAM(S): 5 TABLET ORAL at 15:00

## 2023-05-26 RX ADMIN — Medication 3 MILLILITER(S): at 11:07

## 2023-05-26 RX ADMIN — Medication 255 MILLIMOLE(S): at 04:26

## 2023-05-26 RX ADMIN — SODIUM CHLORIDE 50 MILLILITER(S): 9 INJECTION, SOLUTION INTRAVENOUS at 18:52

## 2023-05-26 RX ADMIN — POLYETHYLENE GLYCOL 3350 17 GRAM(S): 17 POWDER, FOR SOLUTION ORAL at 06:03

## 2023-05-26 RX ADMIN — Medication 3 MILLILITER(S): at 17:28

## 2023-05-26 RX ADMIN — Medication 50 MILLILITER(S): at 10:43

## 2023-05-26 RX ADMIN — OXYCODONE HYDROCHLORIDE 5 MILLIGRAM(S): 5 TABLET ORAL at 14:29

## 2023-05-26 RX ADMIN — ENOXAPARIN SODIUM 40 MILLIGRAM(S): 100 INJECTION SUBCUTANEOUS at 06:02

## 2023-05-26 RX ADMIN — ATORVASTATIN CALCIUM 80 MILLIGRAM(S): 80 TABLET, FILM COATED ORAL at 21:26

## 2023-05-26 RX ADMIN — SENNA PLUS 2 TABLET(S): 8.6 TABLET ORAL at 21:26

## 2023-05-26 RX ADMIN — PHENYLEPHRINE HYDROCHLORIDE 15 MICROGRAM(S)/KG/MIN: 10 INJECTION INTRAVENOUS at 18:51

## 2023-05-26 RX ADMIN — PHENYLEPHRINE HYDROCHLORIDE 15 MICROGRAM(S)/KG/MIN: 10 INJECTION INTRAVENOUS at 02:11

## 2023-05-26 RX ADMIN — Medication 81 MILLIGRAM(S): at 12:19

## 2023-05-26 RX ADMIN — OXYCODONE HYDROCHLORIDE 5 MILLIGRAM(S): 5 TABLET ORAL at 23:37

## 2023-05-26 RX ADMIN — Medication 1: at 16:30

## 2023-05-26 RX ADMIN — PANTOPRAZOLE SODIUM 40 MILLIGRAM(S): 20 TABLET, DELAYED RELEASE ORAL at 06:03

## 2023-05-26 NOTE — PROGRESS NOTE ADULT - ATTENDING COMMENTS
as above   for EVAR
Patient seen and examined and agree with above.   83 year old male with PMH was HTN, HLD, CM, CAD, EF 20-25% s/p EVAR with fem -fem bypass.  Current management includes:  Neuro- continue current pain regimen  CV- Hypotension- on phenylephrine 0.7 mcg/kg/min.  -unclear etiololgy  -will POCUS  Pulm - CXR with hyperinflated lungs.   -will continue nebs       -goal SpO2 92%  GI- regular diet  Endocrine - glucose controlled  Dispo- out of bed to chair

## 2023-05-26 NOTE — PHYSICAL THERAPY INITIAL EVALUATION ADULT - ADDITIONAL COMMENTS
Pt lives with family in private home, few steps to enter and 1 flight to bedroom; pt was independent prior to admission; family states they can assist him upon d/c home.

## 2023-05-26 NOTE — PHYSICAL THERAPY INITIAL EVALUATION ADULT - PERTINENT HX OF CURRENT PROBLEM, REHAB EVAL
83-year-old gentleman PMH HTN, HLD, CM, CAD ( S/P 3 vessel CABG 2002), PCI to RCA 4/11/16, life vest x 3 months, former smoker ( 80 pack years),  AICD presented to the Dr. Tellez's office on 5/17/2032 with a report of a large abdominal aortic aneurysm. The patient's granddaughter stated that several weeks ago the patient was lying down and he felt a pulsatile mass in his abdomen. Patient went a duplex study which showed an 8.5 cm abdominal aortic aneurysm. Patient does not have complaints of abdominal or back pain. CTA outpatient on 5/19/2023 showed  infrarenal abdominal aortic aneurysm measuring 7.1 cm in diameter containing a large amount of mural thrombus. Patient presented to ER today for preop monitor and optimization. Recent ECHO 5/22/23 with EF 20-25%. Patient is now s/p EVAR 5/25,, with fem-fem bypass requiring vasopressor support with phenylephrine

## 2023-05-26 NOTE — PROGRESS NOTE ADULT - ASSESSMENT
Patient is an 83 year old male with a PMHx of HTN, HLD, CM, CAD ( S/P 3 vessel CABG 2002), PCI to RCA 04/11/2016, life vest x 3 months, former smoker ( 80 pack years),  AICD who presented to his outpatient vascular surgeon's office, Dr. Tellez, on 05/17/2023. Patient presented with a reports of a large abdominal aortic aneurysm. Per chart review, patient reported a pulsatile mass in his abdomen. Patient underwent a duplex study that demonstrated an 8.5 cm abdominal aortic aneurysm. Patient underwent CTA as an outpatient on 05/19/2023 and was found to have a 7.1 cm infrarenal aortic aneurysm containing a large mural thrombus. Patient presents to [facility] for elective repair with vascular surgery. Internal Medicine has been consulted on Mr. Nieto's care for medical management. Patient denies abdominal or back pain. Denies chest pain, palpitations, shortness of breath or dyspnea.         Infrarenal AAA associated with Mural Thrombus  - CTA with 7.1 cm infrarenal abdominal aortic aneurysm   - BP control   - Monitor on telemetry   - S/P  EVAR with Vascular Surgery on 05/24  - Care as per vascular surgery/ SICU appreciated     CAD S/P CABG, Ischemic Cardiomyopathy   - S/P PCI to RCA in 2016, S/P AICD placement   - TTE from 2016 with Moderate to severe MR, Mild AR, Severe LV systolic dysfunction, akinesis, Stage 1 diastolic dysfunction, Minimal TR, Minimal MS, Mild Pulm HTN   - On ASA and Statin  - GDMT as per Cardio - On Lasix, Coreg, Lisinopril; Consider holding ACEI for 48 hours and starting Entresto following 48 hour washout period for further GDMT, once off of pressors. Home BP medications on hold as patient on pressors.   - Monitor on telemetry   - 05/22 TTE with EF of 20-25%, Severe LV Systolic dysfunction, akinesis to septum and apex, no LV thrombus, Severely dilated LV, Mild Grade 1 LV Dysfunction, Mild- Mod to MR, Mild to Mod AR --> F/u cardio recs  - Episode of Vtach 05/25 Post-OR; Received Magnesium. In SICU on Phenylephrine. Monitor on tele. F/u cardio recs    Moderate - Severe Mitral Regurgitation  - Seen on TTE from 2016  - F/u repeat TTE as above  - F/u cardio recs     Leukocytosis   - Likely due to reactivity, continue to monitor and trend  - If febrile, check pan cultures    Abnormal CT, Pulm nodule and ILD   - CTA with -- 8mm spiculated nodule in the right lower lobe worrisome for small primary lung cancer. --   - F/u dedicated CT Chest --> With 0.8 CM nodule F/u pulm recs and repeat CT Chest within 1 month.   - Pulmonary eval appreciated; F/u recs     Anemia  - Monitor H/H closely, monitor for gross bleeding  - Continue to monitor and trend levels  - Transfuse for Hgb <8.0 in view of CAD     HTN   - On Lisinopril 10, Imdur 30, Lasix 20 PO, Coreg 3.125 BID   - Monitor BP, VS and patient closely     HLD  - C/w Lipitor 80     Pre-OP Risk Stratification   - TTE noted, low EF, no improvement from prior studies 2016  - AICD interrogation      PPX  - PPI and Hep 5K Q 8   Discussed with attending.     Discussed with Family at the bedside. Care per SICU appreciated.

## 2023-05-26 NOTE — PROGRESS NOTE ADULT - SUBJECTIVE AND OBJECTIVE BOX
HISTORY  83y Male    24 HOUR EVENTS:    SUBJECTIVE/ROS:  [ ] A ten-point review of systems was otherwise negative except as noted.  [ ] Due to altered mental status/intubation, subjective information were not able to be obtained from the patient. History was obtained, to the extent possible, from review of the chart and collateral sources of information.      NEURO  RASS:     GCS:     CAM ICU:  Exam: awake, alert, oriented  Meds: acetaminophen     Tablet .. 650 milliGRAM(s) Oral every 6 hours PRN Mild Pain (1 - 3)  oxyCODONE    IR 2.5 milliGRAM(s) Oral every 4 hours PRN Moderate Pain (4 - 6)  oxyCODONE    IR 5 milliGRAM(s) Oral every 4 hours PRN Severe Pain (7 - 10)    [x] Adequacy of sedation and pain control has been assessed and adjusted      RESPIRATORY  RR: 17 (05-26-23 @ 02:00) (16 - 30)  SpO2: 98% (05-26-23 @ 02:00) (95% - 100%)  Wt(kg): --  Exam: unlabored, clear to auscultation bilaterally  Mechanical Ventilation:   ABG - ( 26 May 2023 00:05 )  pH: 7.43  /  pCO2: 37    /  pO2: 104   / HCO3: 25    / Base Excess: 0.4   /  SaO2: 98.1    Lactate: x                [N/A] Extubation Readiness Assessed  Meds: albuterol/ipratropium for Nebulization 3 milliLiter(s) Nebulizer every 6 hours        CARDIOVASCULAR  HR: 59 (05-26-23 @ 02:00) (59 - 101)  BP: 123/58 (05-25-23 @ 20:22) (70/34 - 135/53)  BP(mean): 83 (05-25-23 @ 20:22) (46 - 85)  ABP: 113/46 (05-26-23 @ 02:00) (82/45 - 130/56)  ABP(mean): 70 (05-26-23 @ 02:00) (51 - 84)  Wt(kg): --  CVP(cm H2O): --  VBG - ( 24 May 2023 07:17 )  pH: 7.35  /  pCO2: 51    /  pO2: 26    / HCO3: 28    / Base Excess: 1.7   /  SaO2: 31.8   Lactate: x                Lactate, Blood: 2.5 mmol/L (05-25 @ 19:14)    Exam: regular rate and rhythm  Cardiac Rhythm: sinus  Perfusion     [x]Adequate   [ ]Inadequate  Mentation   [x]Normal       [ ]Reduced  Extremities  [x]Warm         [ ]Cool  Volume Status [ ]Hypervolemic [x]Euvolemic [ ]Hypovolemic  Meds: phenylephrine    Infusion 0.7 MICROgram(s)/kG/Min IV Continuous <Continuous>        GI/NUTRITION  Exam: soft, nontender, nondistended, incision C/D/I  Diet:  Meds: pantoprazole    Tablet 40 milliGRAM(s) Oral before breakfast      GENITOURINARY  I&O's Detail    05-24 @ 07:01  -  05-25 @ 07:00  --------------------------------------------------------  IN:    Oral Fluid: 720 mL  Total IN: 720 mL    OUT:    Voided (mL): 2350 mL  Total OUT: 2350 mL    Total NET: -1630 mL      05-25 @ 07:01  -  05-26 @ 02:36  --------------------------------------------------------  IN:    IV PiggyBack: 50 mL    IV PiggyBack: 100 mL    Lactated Ringers: 500 mL    Lactated Ringers Bolus: 500 mL    Oral Fluid: 30 mL    Phenylephrine: 97.8 mL    Phenylephrine: 64 mL  Total IN: 1341.8 mL    OUT:    Indwelling Catheter - Urethral (mL): 1540 mL  Total OUT: 1540 mL    Total NET: -198.2 mL        Weight (kg): 57 (05-25 @ 07:21)  05-26    136  |  104  |  12  ----------------------------<  129<H>  4.1   |  20<L>  |  0.80    Ca    8.2<L>      26 May 2023 00:19  Phos  2.9     05-26  Mg     2.2     05-26      [ ] Mcguire catheter, indication: N/A  Meds: lactated ringers. 1000 milliLiter(s) IV Continuous <Continuous>  sodium phosphate 15 milliMole(s)/250 mL IVPB 15 milliMole(s) IV Intermittent once        HEMATOLOGIC  Meds: aspirin  chewable 81 milliGRAM(s) Oral daily  enoxaparin Injectable 40 milliGRAM(s) SubCutaneous every 24 hours    [x] VTE Prophylaxis                        10.1   14.50 )-----------( 176      ( 26 May 2023 00:19 )             30.6     PT/INR - ( 26 May 2023 00:19 )   PT: 14.1 sec;   INR: 1.21 ratio         PTT - ( 26 May 2023 00:19 )  PTT:30.5 sec  Transfusion     [ ] PRBC   [ ] Platelets   [ ] FFP   [ ] Cryoprecipitate      INFECTIOUS DISEASES  WBC Count: 14.50 K/uL (05-26 @ 00:19)  WBC Count: 14.15 K/uL (05-25 @ 20:52)  WBC Count: 11.92 K/uL (05-25 @ 19:14)  WBC Count: 6.24 K/uL (05-25 @ 12:58)    RECENT CULTURES:    Meds:       ENDOCRINE  CAPILLARY BLOOD GLUCOSE      POCT Blood Glucose.: 147 mg/dL (25 May 2023 23:02)    Meds: atorvastatin 80 milliGRAM(s) Oral at bedtime  insulin lispro (ADMELOG) corrective regimen sliding scale   SubCutaneous three times a day before meals  insulin lispro (ADMELOG) corrective regimen sliding scale   SubCutaneous at bedtime        ACCESS DEVICES:  [ ] Peripheral IV  [ ] Central Venous Line	[ ] R	[ ] L	[ ] IJ	[ ] Fem	[ ] SC	Placed:   [ ] Arterial Line		[ ] R	[ ] L	[ ] Fem	[ ] Rad	[ ] Ax	Placed:   [ ] PICC:					[ ] Mediport  [ ] Urinary Catheter, Date Placed:   [x] Necessity of urinary, arterial, and venous catheters discussed    OTHER MEDICATIONS:      CODE STATUS:      IMAGING: SUBJECTIVE/ROS:  [ ] A ten-point review of systems was otherwise negative except as noted.  [ ] Due to altered mental status/intubation, subjective information were not able to be obtained from the patient. History was obtained, to the extent possible, from review of the chart and collateral sources of information.      NEURO  Exam: awake, alert, oriented  Meds: acetaminophen     Tablet .. 650 milliGRAM(s) Oral every 6 hours PRN Mild Pain (1 - 3)  oxyCODONE    IR 2.5 milliGRAM(s) Oral every 4 hours PRN Moderate Pain (4 - 6)  oxyCODONE    IR 5 milliGRAM(s) Oral every 4 hours PRN Severe Pain (7 - 10)  [x] Adequacy of sedation and pain control has been assessed and adjusted      RESPIRATORY  RR: 17 (05-26-23 @ 02:00) (16 - 30)  SpO2: 98% (05-26-23 @ 02:00) (95% - 100%)  Exam: unlabored, clear to auscultation bilaterally  Mechanical Ventilation:   ABG - ( 26 May 2023 00:05 )  pH: 7.43  /  pCO2: 37    /  pO2: 104   / HCO3: 25    / Base Excess: 0.4   /  SaO2: 98.1    Meds: albuterol/ipratropium for Nebulization 3 milliLiter(s) Nebulizer every 6 hours      CARDIOVASCULAR  HR: 59 (05-26-23 @ 02:00) (59 - 101)  BP: 123/58 (05-25-23 @ 20:22) (70/34 - 135/53)  BP(mean): 83 (05-25-23 @ 20:22) (46 - 85)  ABP: 113/46 (05-26-23 @ 02:00) (82/45 - 130/56)  ABP(mean): 70 (05-26-23 @ 02:00) (51 - 84)  VBG - ( 24 May 2023 07:17 )  pH: 7.35  /  pCO2: 51    /  pO2: 26    / HCO3: 28    / Base Excess: 1.7   /  SaO2: 31.8   Lactate, Blood: 2.5 mmol/L (05-25 @ 19:14)    Exam: regular rate and rhythm  Cardiac Rhythm: sinus  Perfusion     [x]Adequate   [ ]Inadequate  Mentation   [x]Normal       [ ]Reduced  Extremities  [x]Warm         [ ]Cool  Volume Status [ ]Hypervolemic [x]Euvolemic [ ]Hypovolemic  Meds: phenylephrine    Infusion 0.7 MICROgram(s)/kG/Min IV Continuous <Continuous>      GI/NUTRITION  Exam: soft, nontender, nondistended, incision C/D/I  Diet: DASH diet  Meds: pantoprazole    Tablet 40 milliGRAM(s) Oral before breakfast      GENITOURINARY  I&O's Detail    05-24 @ 07:01  -  05-25 @ 07:00  --------------------------------------------------------  IN:    Oral Fluid: 720 mL  Total IN: 720 mL    OUT:    Voided (mL): 2350 mL  Total OUT: 2350 mL    Total NET: -1630 mL      05-25 @ 07:01  -  05-26 @ 02:36  --------------------------------------------------------  IN:    IV PiggyBack: 50 mL    IV PiggyBack: 100 mL    Lactated Ringers: 500 mL    Lactated Ringers Bolus: 500 mL    Oral Fluid: 30 mL    Phenylephrine: 97.8 mL    Phenylephrine: 64 mL  Total IN: 1341.8 mL    OUT:    Indwelling Catheter - Urethral (mL): 1540 mL  Total OUT: 1540 mL    Total NET: -198.2 mL        Weight (kg): 57 (05-25 @ 07:21)  05-26    136  |  104  |  12  ----------------------------<  129<H>  4.1   |  20<L>  |  0.80    Ca    8.2<L>      26 May 2023 00:19  Phos  2.9     05-26  Mg     2.2     05-26    Meds: lactated ringers. 1000 milliLiter(s) IV Continuous <Continuous>  sodium phosphate 15 milliMole(s)/250 mL IVPB 15 milliMole(s) IV Intermittent once      HEMATOLOGIC  Meds: aspirin  chewable 81 milliGRAM(s) Oral daily  enoxaparin Injectable 40 milliGRAM(s) SubCutaneous every 24 hours    [x] VTE Prophylaxis                        10.1   14.50 )-----------( 176      ( 26 May 2023 00:19 )             30.6     PT/INR - ( 26 May 2023 00:19 )   PT: 14.1 sec;   INR: 1.21 ratio         PTT - ( 26 May 2023 00:19 )  PTT:30.5 sec  Transfusion     [ ] PRBC   [ ] Platelets   [ ] FFP   [ ] Cryoprecipitate      INFECTIOUS DISEASES  WBC Count: 14.50 K/uL (05-26 @ 00:19)  WBC Count: 14.15 K/uL (05-25 @ 20:52)  WBC Count: 11.92 K/uL (05-25 @ 19:14)  WBC Count: 6.24 K/uL (05-25 @ 12:58)    ENDOCRINE  CAPILLARY BLOOD GLUCOSE  POCT Blood Glucose.: 147 mg/dL (25 May 2023 23:02)    Meds: atorvastatin 80 milliGRAM(s) Oral at bedtime  insulin lispro (ADMELOG) corrective regimen sliding scale   SubCutaneous three times a day before meals  insulin lispro (ADMELOG) corrective regimen sliding scale   SubCutaneous at bedtime      CODE STATUS: full code

## 2023-05-26 NOTE — PROGRESS NOTE ADULT - ASSESSMENT
A/P: 83y Male POD 1 s/p EVAR with Fem-Fem Bypass. Intra-op patient with vtach. Cardiology on board and aware. Post-op, patient received total 4g magnesium and is on Slick 0.4 for low SPB/MAP.    Plan:  - continue care per SICU  - Pain control PRN  - Regular diet  - DVT ppx  - PT/OOB  - d/c rockwell  - wean slick    Vascular Surgery  [phone].

## 2023-05-26 NOTE — OCCUPATIONAL THERAPY INITIAL EVALUATION ADULT - PERTINENT HX OF CURRENT PROBLEM, REHAB EVAL
83-year-old gentleman PMH HTN, HLD, CM, CAD ( S/P 3 vessel CABG 2002), PCI to RCA 4/11/16, life vest x 3 months, former smoker ( 80 pack years),  AICD presented to the Dr. Tellez's office on 5/17/2032 with a report of a large abdominal aortic aneurysm. The patient's granddaughter stated that several weeks ago the patient was lying down and he felt a pulsatile mass in his abdomen. Patient went a duplex study which showed an 8.5 cm abdominal aortic aneurysm. Patient does not have complaints of abdominal or back pain. CTA outpatient on 5/19/2023 showed  infrarenal abdominal aortic aneurysm measuring 7.1 cm in diameter containing a large amount of mural thrombus. Patient presented to ER today for preop monitor and optimization. Recent ECHO 5/22/23 with EF 20-25%. Patient is now s/p EVAR 5/25,, with fem-fem bypass requiring vasopressor support with phenylephrine.

## 2023-05-26 NOTE — PROGRESS NOTE ADULT - SUBJECTIVE AND OBJECTIVE BOX
VASCULAR SURGERY DAILY PROGRESS NOTE:       Subjective / Overnight events:  c/o pain controlled with medications.  Remains on scottie.  Pt without complaints.      Objective:      MEDICATIONS  (STANDING):  albuterol/ipratropium for Nebulization 3 milliLiter(s) Nebulizer every 6 hours  aspirin  chewable 81 milliGRAM(s) Oral daily  atorvastatin 80 milliGRAM(s) Oral at bedtime  enoxaparin Injectable 40 milliGRAM(s) SubCutaneous every 24 hours  insulin lispro (ADMELOG) corrective regimen sliding scale   SubCutaneous three times a day before meals  insulin lispro (ADMELOG) corrective regimen sliding scale   SubCutaneous at bedtime  lactated ringers. 1000 milliLiter(s) (50 mL/Hr) IV Continuous <Continuous>  pantoprazole    Tablet 40 milliGRAM(s) Oral before breakfast  phenylephrine    Infusion 0.7 MICROgram(s)/kG/Min (15 mL/Hr) IV Continuous <Continuous>  polyethylene glycol 3350 17 Gram(s) Oral every 24 hours  senna 2 Tablet(s) Oral at bedtime    MEDICATIONS  (PRN):  acetaminophen     Tablet .. 650 milliGRAM(s) Oral every 6 hours PRN Mild Pain (1 - 3)  oxyCODONE    IR 2.5 milliGRAM(s) Oral every 4 hours PRN Moderate Pain (4 - 6)  oxyCODONE    IR 5 milliGRAM(s) Oral every 4 hours PRN Severe Pain (7 - 10)      Vital Signs Last 24 Hrs  T(C): 36.6 (26 May 2023 07:00), Max: 36.7 (26 May 2023 03:00)  T(F): 97.8 (26 May 2023 07:00), Max: 98.1 (26 May 2023 03:00)  HR: 61 (26 May 2023 08:15) (59 - 101)  BP: 112/53 (26 May 2023 07:15) (70/34 - 135/53)  BP(mean): 77 (26 May 2023 07:15) (46 - 85)  RR: 32 (26 May 2023 08:15) (16 - 32)  SpO2: 99% (26 May 2023 08:15) (95% - 100%)    Parameters below as of 26 May 2023 07:00  Patient On (Oxygen Delivery Method): room air        I&O's Detail    25 May 2023 07:01  -  26 May 2023 07:00  --------------------------------------------------------  IN:    IV PiggyBack: 50 mL    IV PiggyBack: 350 mL    Lactated Ringers: 750 mL    Lactated Ringers Bolus: 500 mL    Oral Fluid: 30 mL    Phenylephrine: 97.8 mL    Phenylephrine: 145.2 mL  Total IN: 1923 mL    OUT:    Indwelling Catheter - Urethral (mL): 2140 mL  Total OUT: 2140 mL    Total NET: -217 mL      26 May 2023 07:01  -  26 May 2023 08:25  --------------------------------------------------------  IN:    Lactated Ringers: 50 mL    Phenylephrine: 12.8 mL  Total IN: 62.8 mL    OUT:    Indwelling Catheter - Urethral (mL): 125 mL  Total OUT: 125 mL    Total NET: -62.2 mL          Daily     Daily Weight in k.9 (26 May 2023 00:00)    LABS:                        10.1   14.50 )-----------( 176      ( 26 May 2023 00:19 )             30.6         136  |  104  |  12  ----------------------------<  129<H>  4.1   |  20<L>  |  0.80    Ca    8.2<L>      26 May 2023 00:19  Phos  2.9       Mg     2.2           PT/INR - ( 26 May 2023 00:19 )   PT: 14.1 sec;   INR: 1.21 ratio         PTT - ( 26 May 2023 00:19 )  PTT:30.5 sec          PHYSICAL EXAM:  Constitutional: NAD, lying flat  Neuro: AOx3,   Respiratory:  Normal respiratory effort on RA  Cardiovascular: Warm and well-perfused. EKG bedside shows mild ectopy.  Gastrointestinal: Abdomen soft, non distended, non tender  : Soft, Aquacell dressing c/d/i. L>R edema  Extremities: No edema, no calf or thigh tenderness.  Vascular: B/l Palp PT, Dopplerable DPs

## 2023-05-26 NOTE — OCCUPATIONAL THERAPY INITIAL EVALUATION ADULT - DIAGNOSIS, OT EVAL
Pt presents with pain, decreased strength, endurance, & balance, all impacting ability to perform ADLs and functional mobility.

## 2023-05-26 NOTE — OCCUPATIONAL THERAPY INITIAL EVALUATION ADULT - LIVES WITH, PROFILE
Pt lives in a house with his daughter, 3 steps to enter, 1 flight of stairs to bedroom, IND PTA without AD.

## 2023-05-26 NOTE — PHYSICAL THERAPY INITIAL EVALUATION ADULT - DISCHARGE PLANNER MADE AWARE
Parents attempting to feed infant.  
Patient presents with parent for concern for not eating well and low urine output. Parents state the patient has not eaten well since leaving the hospital yesterday. State no dirty diapers since leaving the hospital. Full term infant born at 7.9 lbs via vaginal delivery without complication. ABC intact, age appropriate size and behaviors.  
yes

## 2023-05-26 NOTE — PROGRESS NOTE ADULT - ASSESSMENT
83-year-old gentleman PMH HTN, HLD, CM, CAD ( S/P 3 vessel CABG 2002), PCI to RCA 4/11/16, life vest x 3 months, former smoker ( 80 pack years),  EF on ECHO 20-25%, AICD  nows s/p EVAR with fem-fem bypass requiring vasopressor support with phenylephrine 0.3 to maintain MAP within goal of 65-70mmHg. SICU consulted for hemodynamic support with vasopressors.      NEUROLOGIC   - Pain control: oxycodone, tylenol   - AOx3    RESPIRATORY   - Monitor SpO2 goal >92%  - Incentive spirometry   - RA    CARDIOVASCULAR   - Slick 0.4-->0.3  - Monitor hemodynamics   - Maintain MAP 65-70  - Wean pressors as tolerated  - Hold home blood pressure medications while on pressors    GASTROINTESTINAL   - Diet: DASH diet (restrictions as per order)  - Monitor bowel function    /RENAL   - IV fluids: LR @ 50cc/hr  - Strict I/Os  - Monitor BMP qd  - strict Is/Os  - Monitor electrolytes, replete PRN  - Hold home lasix 20 while on pressors    HEMATOLOGIC  - Monitor H/H   - DVT ppx: Lovenox  - ASA 81 qd    INFECTIOUS DISEASE  - Monitor fever / WBC  - No antibiotics    ENDOCRINE  - Monitor gluc    LINES  - PIV    83-year-old gentleman PMH HTN, HLD, CM, CAD ( S/P 3 vessel CABG 2002), PCI to RCA 4/11/16, life vest x 3 months, former smoker ( 80 pack years),  EF on ECHO 20-25%, AICD now s/p EVAR with fem-fem bypass requiring vasopressor support with phenylephrine to maintain MAP within goal of >65mmHg. SICU consulted for hemodynamic support with vasopressors.    PLAN:  NEUROLOGIC   - Pain control: oxycodone, tylenol   - AOx3    RESPIRATORY   - Monitor SpO2 goal >92%  - Incentive spirometry   - RA    CARDIOVASCULAR   - Slick 0.7-->0.3  - Monitor hemodynamics   - Maintain MAP >65  - Wean pressors as tolerated  - Hold home blood pressure medications while on pressors    GASTROINTESTINAL   - Diet: DASH diet (restrictions as per order)  - Monitor bowel function    /RENAL   - IV fluids: LR @ 50cc/hr  - Strict I/Os  - Monitor electrolytes, replete PRN  - Hold home lasix 20 while on pressors    HEMATOLOGIC  - Monitor H/H   - DVT ppx: Lovenox  - ASA 81 qd    INFECTIOUS DISEASE  - Monitor fever / WBC  - No antibiotics    ENDOCRINE  - Monitor gluc    LINES  - PIV

## 2023-05-26 NOTE — PROGRESS NOTE ADULT - SUBJECTIVE AND OBJECTIVE BOX
Name of Patient : CATHY MARTINES  MRN: [id number]  Date of visit: 05-26-23 @ 15:05      Subjective: Patient seen and examined. No new events except as noted.   Patient seen earlier this AM. In SICU   S/P OR for Femoral to femoral bypass.  Post-Op found to be in Vtach. Received magnesium and now on Phenyephrine in SICU  Reports pain controlled  Family at the bedside.    REVIEW OF SYSTEMS:    CONSTITUTIONAL: Generalized weakness   EYES/ENT: No visual changes;  No vertigo or throat pain   NECK: No pain or stiffness  RESPIRATORY: No cough, wheezing, hemoptysis; No shortness of breath  CARDIOVASCULAR: No chest pain or palpitations  GASTROINTESTINAL: No abdominal or epigastric pain. No nausea, vomiting, or hematemesis; No diarrhea or constipation. No melena or hematochezia.  GENITOURINARY: No dysuria, frequency or hematuria  NEUROLOGICAL: No numbness or weakness  SKIN: S/P Fem-Fem bypass  All other review of systems is negative unless indicated above.    MEDICATIONS:  MEDICATIONS  (STANDING):  albuterol/ipratropium for Nebulization 3 milliLiter(s) Nebulizer every 6 hours  aspirin  chewable 81 milliGRAM(s) Oral daily  atorvastatin 80 milliGRAM(s) Oral at bedtime  enoxaparin Injectable 40 milliGRAM(s) SubCutaneous every 24 hours  insulin lispro (ADMELOG) corrective regimen sliding scale   SubCutaneous three times a day before meals  insulin lispro (ADMELOG) corrective regimen sliding scale   SubCutaneous at bedtime  lactated ringers. 1000 milliLiter(s) (50 mL/Hr) IV Continuous <Continuous>  pantoprazole    Tablet 40 milliGRAM(s) Oral before breakfast  phenylephrine    Infusion 0.7 MICROgram(s)/kG/Min (15 mL/Hr) IV Continuous <Continuous>  polyethylene glycol 3350 17 Gram(s) Oral every 24 hours  senna 2 Tablet(s) Oral at bedtime      PHYSICAL EXAM:  T(C): 36.7 (05-26-23 @ 11:00), Max: 36.7 (05-26-23 @ 03:00)  HR: 63 (05-26-23 @ 15:00) (59 - 81)  BP: 115/53 (05-26-23 @ 15:00) (87/42 - 135/53)  RR: 20 (05-26-23 @ 15:00) (13 - 34)  SpO2: 98% (05-26-23 @ 15:00) (97% - 100%)  Wt(kg): --  I&O's Summary    25 May 2023 07:01  -  26 May 2023 07:00  --------------------------------------------------------  IN: 1923 mL / OUT: 2140 mL / NET: -217 mL    26 May 2023 07:01  -  26 May 2023 15:05  --------------------------------------------------------  IN: 548.3 mL / OUT: 395 mL / NET: 153.3 mL          Appearance: Normal; OOB TC   HEENT: Eyes are open   Lymphatic: No lymphadenopathy   Cardiovascular: Normal    Respiratory: normal effort , clear  Gastrointestinal:  Soft, Non-tender  Skin: No rashes,  warm to touch  Psychiatry:  Mood & affect appropriate  Musculoskeletal: No edema        05-25-23 @ 07:01  -  05-26-23 @ 07:00  --------------------------------------------------------  IN: 1923 mL / OUT: 2140 mL / NET: -217 mL    05-26-23 @ 07:01  -  05-26-23 @ 15:05  --------------------------------------------------------  IN: 548.3 mL / OUT: 395 mL / NET: 153.3 mL                                9.8    13.73 )-----------( 177      ( 26 May 2023 10:37 )             29.7               05-26    135  |  104  |  14  ----------------------------<  193<H>  4.0   |  20<L>  |  0.75    Ca    8.4      26 May 2023 10:37  Phos  3.4     05-26  Mg     2.0     05-26      PT/INR - ( 26 May 2023 00:19 )   PT: 14.1 sec;   INR: 1.21 ratio         PTT - ( 26 May 2023 00:19 )  PTT:30.5 sec                 ABG - ( 26 May 2023 10:30 )  pH, Arterial: 7.45  pH, Blood: x     /  pCO2: 33    /  pO2: 101   / HCO3: 23    / Base Excess: -0.7  /  SaO2: 97.7                < from: CT Chest No Cont (05.24.23 @ 09:30) >  IMPRESSION:    Right lower lobe nodule with irregular border measuring 0.8 cm which may   represent infection vs malignancy. Short interval followup is recommended   in 3 months to differentiate between the two possibilities.    < end of copied text >

## 2023-05-27 ENCOUNTER — TRANSCRIPTION ENCOUNTER (OUTPATIENT)
Age: 83
End: 2023-05-27

## 2023-05-27 LAB
ANION GAP SERPL CALC-SCNC: 9 MMOL/L — SIGNIFICANT CHANGE UP (ref 5–17)
BUN SERPL-MCNC: 14 MG/DL — SIGNIFICANT CHANGE UP (ref 7–23)
CALCIUM SERPL-MCNC: 7.8 MG/DL — LOW (ref 8.4–10.5)
CHLORIDE SERPL-SCNC: 103 MMOL/L — SIGNIFICANT CHANGE UP (ref 96–108)
CO2 SERPL-SCNC: 21 MMOL/L — LOW (ref 22–31)
CREAT SERPL-MCNC: 0.83 MG/DL — SIGNIFICANT CHANGE UP (ref 0.5–1.3)
EGFR: 87 ML/MIN/1.73M2 — SIGNIFICANT CHANGE UP
GAS PNL BLDA: SIGNIFICANT CHANGE UP
GLUCOSE BLDC GLUCOMTR-MCNC: 135 MG/DL — HIGH (ref 70–99)
GLUCOSE BLDC GLUCOMTR-MCNC: 92 MG/DL — SIGNIFICANT CHANGE UP (ref 70–99)
GLUCOSE BLDC GLUCOMTR-MCNC: 99 MG/DL — SIGNIFICANT CHANGE UP (ref 70–99)
GLUCOSE SERPL-MCNC: 113 MG/DL — HIGH (ref 70–99)
HCT VFR BLD CALC: 26.8 % — LOW (ref 39–50)
HCT VFR BLD CALC: 28.5 % — LOW (ref 39–50)
HGB BLD-MCNC: 8.9 G/DL — LOW (ref 13–17)
HGB BLD-MCNC: 9.4 G/DL — LOW (ref 13–17)
MAGNESIUM SERPL-MCNC: 1.6 MG/DL — SIGNIFICANT CHANGE UP (ref 1.6–2.6)
MCHC RBC-ENTMCNC: 29.5 PG — SIGNIFICANT CHANGE UP (ref 27–34)
MCHC RBC-ENTMCNC: 29.6 PG — SIGNIFICANT CHANGE UP (ref 27–34)
MCHC RBC-ENTMCNC: 33 GM/DL — SIGNIFICANT CHANGE UP (ref 32–36)
MCHC RBC-ENTMCNC: 33.2 GM/DL — SIGNIFICANT CHANGE UP (ref 32–36)
MCV RBC AUTO: 88.7 FL — SIGNIFICANT CHANGE UP (ref 80–100)
MCV RBC AUTO: 89.6 FL — SIGNIFICANT CHANGE UP (ref 80–100)
NRBC # BLD: 0 /100 WBCS — SIGNIFICANT CHANGE UP (ref 0–0)
NRBC # BLD: 0 /100 WBCS — SIGNIFICANT CHANGE UP (ref 0–0)
PHOSPHATE SERPL-MCNC: 2.5 MG/DL — SIGNIFICANT CHANGE UP (ref 2.5–4.5)
PLATELET # BLD AUTO: 100 K/UL — LOW (ref 150–400)
PLATELET # BLD AUTO: 108 K/UL — LOW (ref 150–400)
PLATELET # BLD AUTO: 95 K/UL — LOW (ref 150–400)
POTASSIUM SERPL-MCNC: 4.1 MMOL/L — SIGNIFICANT CHANGE UP (ref 3.5–5.3)
POTASSIUM SERPL-SCNC: 4.1 MMOL/L — SIGNIFICANT CHANGE UP (ref 3.5–5.3)
RBC # BLD: 3.02 M/UL — LOW (ref 4.2–5.8)
RBC # BLD: 3.18 M/UL — LOW (ref 4.2–5.8)
RBC # FLD: 14.4 % — SIGNIFICANT CHANGE UP (ref 10.3–14.5)
RBC # FLD: 14.6 % — HIGH (ref 10.3–14.5)
SODIUM SERPL-SCNC: 133 MMOL/L — LOW (ref 135–145)
WBC # BLD: 7.45 K/UL — SIGNIFICANT CHANGE UP (ref 3.8–10.5)
WBC # BLD: 8.22 K/UL — SIGNIFICANT CHANGE UP (ref 3.8–10.5)
WBC # FLD AUTO: 7.45 K/UL — SIGNIFICANT CHANGE UP (ref 3.8–10.5)
WBC # FLD AUTO: 8.22 K/UL — SIGNIFICANT CHANGE UP (ref 3.8–10.5)

## 2023-05-27 RX ORDER — MAGNESIUM SULFATE 500 MG/ML
2 VIAL (ML) INJECTION ONCE
Refills: 0 | Status: COMPLETED | OUTPATIENT
Start: 2023-05-27 | End: 2023-05-27

## 2023-05-27 RX ORDER — SODIUM,POTASSIUM PHOSPHATES 278-250MG
1 POWDER IN PACKET (EA) ORAL ONCE
Refills: 0 | Status: COMPLETED | OUTPATIENT
Start: 2023-05-27 | End: 2023-05-27

## 2023-05-27 RX ADMIN — OXYCODONE HYDROCHLORIDE 5 MILLIGRAM(S): 5 TABLET ORAL at 16:06

## 2023-05-27 RX ADMIN — Medication 3 MILLILITER(S): at 00:49

## 2023-05-27 RX ADMIN — ENOXAPARIN SODIUM 40 MILLIGRAM(S): 100 INJECTION SUBCUTANEOUS at 05:48

## 2023-05-27 RX ADMIN — Medication 81 MILLIGRAM(S): at 13:18

## 2023-05-27 RX ADMIN — POLYETHYLENE GLYCOL 3350 17 GRAM(S): 17 POWDER, FOR SOLUTION ORAL at 05:48

## 2023-05-27 RX ADMIN — OXYCODONE HYDROCHLORIDE 5 MILLIGRAM(S): 5 TABLET ORAL at 15:06

## 2023-05-27 RX ADMIN — SENNA PLUS 2 TABLET(S): 8.6 TABLET ORAL at 22:20

## 2023-05-27 RX ADMIN — Medication 10 MILLIGRAM(S): at 15:07

## 2023-05-27 RX ADMIN — Medication 3 MILLILITER(S): at 05:37

## 2023-05-27 RX ADMIN — Medication 25 GRAM(S): at 13:17

## 2023-05-27 RX ADMIN — PANTOPRAZOLE SODIUM 40 MILLIGRAM(S): 20 TABLET, DELAYED RELEASE ORAL at 05:48

## 2023-05-27 RX ADMIN — OXYCODONE HYDROCHLORIDE 5 MILLIGRAM(S): 5 TABLET ORAL at 00:07

## 2023-05-27 RX ADMIN — Medication 255 MILLIMOLE(S): at 14:43

## 2023-05-27 RX ADMIN — Medication 1 PACKET(S): at 05:48

## 2023-05-27 RX ADMIN — Medication 255 MILLIMOLE(S): at 13:33

## 2023-05-27 RX ADMIN — ATORVASTATIN CALCIUM 80 MILLIGRAM(S): 80 TABLET, FILM COATED ORAL at 22:20

## 2023-05-27 RX ADMIN — Medication 3 MILLILITER(S): at 17:50

## 2023-05-27 RX ADMIN — Medication 3 MILLILITER(S): at 12:09

## 2023-05-27 NOTE — DISCHARGE NOTE PROVIDER - HOSPITAL COURSE
AS CONRAD is a 83y Male who was admitted to [facility] for an elective 7.1cm abdominal aortic aneurism repair. Patient was admitted for preoperative optimization prior to scheduled OR date. Cardiology and Internal Medicine consulted for preoperative optimizations and risk stratification prior to taking the patient to the OR. Patient also with a pulmonary nodule noted on CTA to assess the aneurysm, so pulmonology called to initiate workup.     4 days after admission, patient was optimized for OR and had his AICD interrogated. Patient underwent a Aorto-unilateral Iliac stent placement with a femoral to femoral bypass. Post-operatively, patient did well with an appropriate exam and without symptoms. However, patient's blood pressure was low (80s systolic) with a MAP in the 50s. Patient was started on Slick in the PACU to raise his blood pressure and continued to require it after his 6 hour PACU stay was over. SICU consulted at that time for continued monitoring and a Slick drip and was accepted.    Patient remained on a Slick drip until POD #2 when patient's blood pressure responded after 1 unit of packed red blood cells. Patient continued to be closely monitored in SICU until he was downgraded on POD# .......     After downgrade from SICU, patient recovered well. Patient was ready for discharge on POD # ....... Physical therapy recommended home physical therapy and patient ready to go home.    At time of discharge, pt was tolerating a regular diet, voiding/stooling independently, ambulating, and pain was well-controlled. Patient and family felt ready for discharge.

## 2023-05-27 NOTE — PROGRESS NOTE ADULT - ASSESSMENT
Patient is an 83 year old male with a PMHx of HTN, HLD, CM, CAD ( S/P 3 vessel CABG 2002), PCI to RCA 04/11/2016, life vest x 3 months, former smoker ( 80 pack years),  AICD who presented to his outpatient vascular surgeon's office, Dr. Tellez, on 05/17/2023. Patient presented with a reports of a large abdominal aortic aneurysm. Per chart review, patient reported a pulsatile mass in his abdomen. Patient underwent a duplex study that demonstrated an 8.5 cm abdominal aortic aneurysm. Patient underwent CTA as an outpatient on 05/19/2023 and was found to have a 7.1 cm infrarenal aortic aneurysm containing a large mural thrombus. Patient presents to [facility] for elective repair with vascular surgery. Internal Medicine has been consulted on Mr. Nieto's care for medical management. Patient denies abdominal or back pain. Denies chest pain, palpitations, shortness of breath or dyspnea.         Infrarenal AAA associated with Mural Thrombus  - CTA with 7.1 cm infrarenal abdominal aortic aneurysm   - BP control   - Monitor on telemetry   - S/P  EVAR with Vascular Surgery on 05/24  - Care as per vascular surgery/ SICU appreciated     CAD S/P CABG, Ischemic Cardiomyopathy   - S/P PCI to RCA in 2016, S/P AICD placement   - TTE from 2016 with Moderate to severe MR, Mild AR, Severe LV systolic dysfunction, akinesis, Stage 1 diastolic dysfunction, Minimal TR, Minimal ID, Mild Pulm HTN   - On ASA and Statin  - GDMT as per Cardio - On Lasix, Coreg, Lisinopril; Consider holding ACEI for 48 hours and starting Entresto following 48 hour washout period for further GDMT, once off of pressors. Home BP medications on hold as patient on pressors.   - Monitor on telemetry   - 05/22 TTE with EF of 20-25%, Severe LV Systolic dysfunction, akinesis to septum and apex, no LV thrombus, Severely dilated LV, Mild Grade 1 LV Dysfunction, Mild- Mod to MR, Mild to Mod AR --> F/u cardio recs  - Episode of Vtach 05/25 Post-OR; Received Magnesium. In SICU on Phenylephrine. Monitor on tele. F/u cardio recs    Moderate - Severe Mitral Regurgitation  - Seen on TTE from 2016  - F/u repeat TTE as above  - F/u cardio recs     Leukocytosis   - Likely due to reactivity, continue to monitor and trend  - If febrile, check pan cultures    Abnormal CT, Pulm nodule and ILD   - CTA with -- 8mm spiculated nodule in the right lower lobe worrisome for small primary lung cancer. --   - F/u dedicated CT Chest --> With 0.8 CM nodule F/u pulm recs and repeat CT Chest within 1 month.   - Pulmonary eval appreciated; F/u recs     Anemia  - Monitor H/H closely, monitor for gross bleeding  - Continue to monitor and trend levels  - Transfuse for Hgb <8.0 in view of CAD     HTN   - On Lisinopril 10, Imdur 30, Lasix 20 PO, Coreg 3.125 BID   - Monitor BP, VS and patient closely     HLD  - C/w Lipitor 80     Pre-OP Risk Stratification   - TTE noted, low EF, no improvement from prior studies 2016  - AICD interrogation      PPX  - PPI and Hep 5K Q 8   Discussed with attending.     Discussed with Family at the bedside. Care per SICU appreciated.

## 2023-05-27 NOTE — PROGRESS NOTE ADULT - SUBJECTIVE AND OBJECTIVE BOX
Subjective: Patient seen and examined. No new events except as noted.   OVERNIGHT EVENTS:   - s/p 1 unit pRBCs  - Off Slick  - Mcguire out, passed TOV    REVIEW OF SYSTEMS:    CONSTITUTIONAL: + weakness, fevers or chills  EYES/ENT: No visual changes;  No vertigo or throat pain   NECK: No pain or stiffness  RESPIRATORY: No cough, wheezing, hemoptysis; No shortness of breath  CARDIOVASCULAR: No chest pain or palpitations  GASTROINTESTINAL: No abdominal or epigastric pain. No nausea, vomiting, or hematemesis; No diarrhea or constipation. No melena or hematochezia.  GENITOURINARY: No dysuria, frequency or hematuria  NEUROLOGICAL: No numbness or weakness  SKIN: No itching, burning, rashes, or lesions   All other review of systems is negative unless indicated above.    MEDICATIONS:  MEDICATIONS  (STANDING):  albuterol/ipratropium for Nebulization 3 milliLiter(s) Nebulizer every 6 hours  aspirin  chewable 81 milliGRAM(s) Oral daily  atorvastatin 80 milliGRAM(s) Oral at bedtime  enoxaparin Injectable 40 milliGRAM(s) SubCutaneous every 24 hours  insulin lispro (ADMELOG) corrective regimen sliding scale   SubCutaneous at bedtime  insulin lispro (ADMELOG) corrective regimen sliding scale   SubCutaneous three times a day before meals  pantoprazole    Tablet 40 milliGRAM(s) Oral before breakfast  polyethylene glycol 3350 17 Gram(s) Oral every 24 hours  senna 2 Tablet(s) Oral at bedtime      PHYSICAL EXAM:  T(C): 37.6 (05-27-23 @ 21:01), Max: 37.7 (05-27-23 @ 17:25)  HR: 78 (05-27-23 @ 21:01) (59 - 78)  BP: 106/58 (05-27-23 @ 21:01) (91/46 - 135/64)  RR: 18 (05-27-23 @ 21:01) (10 - 34)  SpO2: 95% (05-27-23 @ 21:01) (94% - 99%)  Wt(kg): --  I&O's Summary    26 May 2023 07:01  -  27 May 2023 07:00  --------------------------------------------------------  IN: 1310.9 mL / OUT: 1155 mL / NET: 155.9 mL    27 May 2023 07:01  -  27 May 2023 22:17  --------------------------------------------------------  IN: 790 mL / OUT: 900 mL / NET: -110 mL          Appearance: Normal	  HEENT:   Normal oral mucosa, PERRL, EOMI	  Lymphatic: No lymphadenopathy , no edema  Cardiovascular: Normal S1 S2, No JVD, No murmurs , Peripheral pulses palpable 2+ bilaterally  Respiratory: Lungs clear to auscultation, normal effort 	  Gastrointestinal:  Soft, Non-tender, + BS	  Skin: No rashes, No ecchymoses, No cyanosis, warm to touch  Musculoskeletal: Normal range of motion, normal strength  : Soft, Aquacell dressing c/d/i. L>R edema  Extremities: No edema, no calf or thigh tenderness.  Vascular: B/l Palp PT      LABS:    CARDIAC MARKERS:                                9.4    8.22  )-----------( 100      ( 27 May 2023 11:08 )             28.5     05-27    133<L>  |  103  |  14  ----------------------------<  113<H>  4.1   |  21<L>  |  0.83    Ca    7.8<L>      27 May 2023 11:08  Phos  2.5     05-27  Mg     1.6     05-27      proBNP:   Lipid Profile:   HgA1c:   TSH:             TELEMETRY: 	    ECG:  	  RADIOLOGY:   DIAGNOSTIC TESTING:  [ ] Echocardiogram:  [ ]  Catheterization:  [ ] Stress Test:    OTHER:

## 2023-05-27 NOTE — PROGRESS NOTE ADULT - SUBJECTIVE AND OBJECTIVE BOX
24 HOUR EVENTS:  - Slick gtt titrated off  - Will hold off on IVF for now as patient full on POCUS and EF low  - 1 PRBC for downtrending H&H  - Mcguire removed, patient passed TOV    SUBJECTIVE/ROS:  [ X ] A ten-point review of systems was otherwise negative except as noted.  [ ] Due to altered mental status/intubation, subjective information were not able to be obtained from the patient. History was obtained, to the extent possible, from review of the chart and collateral sources of information.      NEURO  Exam: AOx3. NAD. Follows commands. Moves all extremities. Strength and sensation intact.   Meds: acetaminophen     Tablet .. 650 milliGRAM(s) Oral every 6 hours PRN Mild Pain (1 - 3)  oxyCODONE    IR 2.5 milliGRAM(s) Oral every 4 hours PRN Moderate Pain (4 - 6)  oxyCODONE    IR 5 milliGRAM(s) Oral every 4 hours PRN Severe Pain (7 - 10)    [x] Adequacy of sedation and pain control has been assessed and adjusted      RESPIRATORY  RR: 14 (05-27-23 @ 04:00) (10 - 34)  SpO2: 97% (05-27-23 @ 04:00) (90% - 100%)  Wt(kg): --  Exam: CTA b/l. No murmurs, rubs, gallops appreciated.   Mechanical Ventilation:   ABG - ( 26 May 2023 22:55 )  pH: 7.42  /  pCO2: 36    /  pO2: 125   / HCO3: 23    / Base Excess: -0.9  /  SaO2: 98.3    Lactate: x      [ ] Extubation Readiness Assessed  Meds: albuterol/ipratropium for Nebulization 3 milliLiter(s) Nebulizer every 6 hours        CARDIOVASCULAR  HR: 63 (05-27-23 @ 04:00) (59 - 77)  BP: 95/51 (05-27-23 @ 04:00) (87/49 - 166/51)  BP(mean): 70 (05-27-23 @ 04:00) (59 - 88)  ABP: 96/36 (05-27-23 @ 04:00) (82/37 - 132/56)  ABP(mean): 54 (05-27-23 @ 04:00) (53 - 81)  Wt(kg): --  CVP(cm H2O): --  Exam: S1S2. No murmurs, rubs, gallops appreciated.  Cardiac Rhythm: Paced rhythm with good capture rate 60s  Meds:       GI/NUTRITION  Exam: Soft, non-distended, non-tender.   Diet: DASH  Meds: pantoprazole    Tablet 40 milliGRAM(s) Oral before breakfast  polyethylene glycol 3350 17 Gram(s) Oral every 24 hours  senna 2 Tablet(s) Oral at bedtime      GENITOURINARY  I&O's Detail    05-25 @ 07:01 - 05-26 @ 07:00  --------------------------------------------------------  IN:    IV PiggyBack: 50 mL    IV PiggyBack: 350 mL    Lactated Ringers: 750 mL    Lactated Ringers Bolus: 500 mL    Oral Fluid: 30 mL    Phenylephrine: 97.8 mL    Phenylephrine: 145.2 mL  Total IN: 1923 mL    OUT:    Indwelling Catheter - Urethral (mL): 2140 mL  Total OUT: 2140 mL    Total NET: -217 mL      05-26 @ 07:01 - 05-27 @ 04:32  --------------------------------------------------------  IN:    IV PiggyBack: 50 mL    Lactated Ringers: 850 mL    Phenylephrine: 110.9 mL    PRBCs (Packed Red Blood Cells): 300 mL  Total IN: 1310.9 mL    OUT:    Indwelling Catheter - Urethral (mL): 455 mL    Voided (mL): 400 mL  Total OUT: 855 mL    Total NET: 455.9 mL          05-26    135  |  104  |  14  ----------------------------<  169<H>  3.9   |  20<L>  |  0.77    Ca    7.7<L>      26 May 2023 23:06  Phos  2.6     05-26  Mg     1.8     05-26      [ ] Mcguire catheter, indication: N/A  Meds:       HEMATOLOGIC  Meds: aspirin  chewable 81 milliGRAM(s) Oral daily  enoxaparin Injectable 40 milliGRAM(s) SubCutaneous every 24 hours    [x] VTE Prophylaxis                        7.6    6.96  )-----------( 108      ( 26 May 2023 23:05 )             24.3     PT/INR - ( 26 May 2023 23:05 )   PT: 13.9 sec;   INR: 1.21 ratio         PTT - ( 26 May 2023 23:05 )  PTT:31.9 sec  Transfusion     [ ] PRBC   [ ] Platelets   [ ] FFP   [ ] Cryoprecipitate      INFECTIOUS DISEASES  T(C): 36.7 (05-27-23 @ 03:00), Max: 36.9 (05-26-23 @ 23:00)  Wt(kg): --  WBC Count: 6.96 K/uL (05-26 @ 23:05)  WBC Count: 13.73 K/uL (05-26 @ 10:37)    Recent Cultures:    Meds:       ENDOCRINE  Capillary Blood Glucose    Meds: atorvastatin 80 milliGRAM(s) Oral at bedtime  insulin lispro (ADMELOG) corrective regimen sliding scale   SubCutaneous three times a day before meals  insulin lispro (ADMELOG) corrective regimen sliding scale   SubCutaneous at bedtime        ACCESS DEVICES:  [ X ] Peripheral IV  [ ] Central Venous Line	[ ] R	[ ] L	[ ] IJ	[ ] Fem	[ ] SC	Placed:   [ ] Arterial Line		[ ] R	[ ] L	[ ] Fem	[ ] Rad	[ ] Ax	Placed:   [ ] PICC:					[ ] Mediport  [ ] Urinary Catheter, Date Placed:   [ ] Necessity of urinary, arterial, and venous catheters discussed    OTHER MEDICATIONS:      CODE STATUS: Full    IMAGING:

## 2023-05-27 NOTE — PROGRESS NOTE ADULT - SUBJECTIVE AND OBJECTIVE BOX
Name of Patient : CATHY MARTINES  MRN: [id number]      Subjective: Patient seen and examined. No new events except as noted.     REVIEW OF SYSTEMS:    CONSTITUTIONAL: No weakness, fevers or chills  EYES/ENT: No visual changes;  No vertigo or throat pain   NECK: No pain or stiffness  RESPIRATORY: No cough, wheezing, hemoptysis; No shortness of breath  CARDIOVASCULAR: No chest pain or palpitations  GASTROINTESTINAL: No abdominal or epigastric pain. No nausea, vomiting, or hematemesis; No diarrhea or constipation. No melena or hematochezia.  GENITOURINARY: No dysuria, frequency or hematuria  NEUROLOGICAL: No numbness or weakness  SKIN: No itching, burning, rashes, or lesions   All other review of systems is negative unless indicated above.    MEDICATIONS:  MEDICATIONS  (STANDING):  albuterol/ipratropium for Nebulization 3 milliLiter(s) Nebulizer every 6 hours  aspirin  chewable 81 milliGRAM(s) Oral daily  atorvastatin 80 milliGRAM(s) Oral at bedtime  enoxaparin Injectable 40 milliGRAM(s) SubCutaneous every 24 hours  insulin lispro (ADMELOG) corrective regimen sliding scale   SubCutaneous three times a day before meals  insulin lispro (ADMELOG) corrective regimen sliding scale   SubCutaneous at bedtime  pantoprazole    Tablet 40 milliGRAM(s) Oral before breakfast  polyethylene glycol 3350 17 Gram(s) Oral every 24 hours  senna 2 Tablet(s) Oral at bedtime      PHYSICAL EXAM:  T(C): 37.6 (05-27-23 @ 21:01), Max: 37.7 (05-27-23 @ 17:25)  HR: 78 (05-27-23 @ 21:01) (60 - 78)  BP: 106/58 (05-27-23 @ 21:01) (91/46 - 135/64)  RR: 18 (05-27-23 @ 21:01) (14 - 34)  SpO2: 95% (05-27-23 @ 21:01) (95% - 99%)  Wt(kg): --  I&O's Summary    26 May 2023 07:01  -  27 May 2023 07:00  --------------------------------------------------------  IN: 1310.9 mL / OUT: 1155 mL / NET: 155.9 mL    27 May 2023 07:01  -  28 May 2023 00:00  --------------------------------------------------------  IN: 890 mL / OUT: 1175 mL / NET: -285 mL          Appearance: Normal	  HEENT:  PERRLA   Lymphatic: No lymphadenopathy   Cardiovascular: Normal S1 S2, no JVD  Respiratory: normal effort , clear  Gastrointestinal:  Soft, Non-tender  Skin: No rashes,  warm to touch  Psychiatry:  Mood & affect appropriate  Musculuskeletal: No edema    recent labs, Imaging and EKGs personally reviewed     05-26-23 @ 07:01  -  05-27-23 @ 07:00  --------------------------------------------------------  IN: 1310.9 mL / OUT: 1155 mL / NET: 155.9 mL    05-27-23 @ 07:01 - 05-28-23 @ 00:00  --------------------------------------------------------  IN: 890 mL / OUT: 1175 mL / NET: -285 mL

## 2023-05-27 NOTE — DISCHARGE NOTE PROVIDER - NSDCCPCAREPLAN_GEN_ALL_CORE_FT
PRINCIPAL DISCHARGE DIAGNOSIS  Diagnosis: AAA (abdominal aortic aneurysm) without rupture  Assessment and Plan of Treatment:

## 2023-05-27 NOTE — PROGRESS NOTE ADULT - SUBJECTIVE AND OBJECTIVE BOX
TEAM VASCULAR Surgery Daily Progress Note  =====================================================    OVERNIGHT EVENTS:   - s/p 1 unit pRBCs  - Off Slick  - Mcguire out, passed TOV    SUBJECTIVE: Patient seen and examined at bedside on AM rounds. NAD. Having some butt pain from sitting in the chair.    --------------------------------------------------------------------------------------  OBJECTIVE:    VITAL SIGNS:  Vital Signs Last 24 Hrs  T(C): 37.1 (27 May 2023 07:00), Max: 37.1 (27 May 2023 07:00)  T(F): 98.8 (27 May 2023 07:00), Max: 98.8 (27 May 2023 07:00)  HR: 60 (27 May 2023 08:00) (59 - 77)  BP: 104/49 (27 May 2023 08:00) (87/49 - 166/51)  BP(mean): 71 (27 May 2023 08:00) (59 - 88)  RR: 24 (27 May 2023 08:00) (10 - 34)  SpO2: 97% (27 May 2023 08:00) (90% - 100%)    Parameters below as of 27 May 2023 07:00  Patient On (Oxygen Delivery Method): room air      --------------------------------------------------------------------------------------    EXAM:  Constitutional: NAD, lying flat  Neuro: AOx3  Respiratory:  Normal respiratory effort on RA  Cardiovascular: Warm and well-perfused.  Gastrointestinal: Abdomen soft, non distended, non tender  : Soft, Aquacell dressing c/d/i. L>R edema  Extremities: No edema, no calf or thigh tenderness.  Vascular: B/l Palp PT    --------------------------------------------------------------------------------------    LABS:                        8.9    7.45  )-----------( 95       ( 27 May 2023 05:59 )             26.8     05-26    135  |  104  |  14  ----------------------------<  169<H>  3.9   |  20<L>  |  0.77    Ca    7.7<L>      26 May 2023 23:06  Phos  2.6     05-26  Mg     1.8     05-26      PT/INR - ( 26 May 2023 23:05 )   PT: 13.9 sec;   INR: 1.21 ratio         PTT - ( 26 May 2023 23:05 )  PTT:31.9 sec      --------------------------------------------------------------------------------------    INS AND OUTS:    05-26-23 @ 07:01  -  05-27-23 @ 07:00  --------------------------------------------------------  IN: 1310.9 mL / OUT: 1155 mL / NET: 155.9 mL        --------------------------------------------------------------------------------------    MEDICATIONS:  MEDICATIONS  (STANDING):  albuterol/ipratropium for Nebulization 3 milliLiter(s) Nebulizer every 6 hours  aspirin  chewable 81 milliGRAM(s) Oral daily  atorvastatin 80 milliGRAM(s) Oral at bedtime  enoxaparin Injectable 40 milliGRAM(s) SubCutaneous every 24 hours  insulin lispro (ADMELOG) corrective regimen sliding scale   SubCutaneous three times a day before meals  insulin lispro (ADMELOG) corrective regimen sliding scale   SubCutaneous at bedtime  pantoprazole    Tablet 40 milliGRAM(s) Oral before breakfast  polyethylene glycol 3350 17 Gram(s) Oral every 24 hours  senna 2 Tablet(s) Oral at bedtime    MEDICATIONS  (PRN):  acetaminophen     Tablet .. 650 milliGRAM(s) Oral every 6 hours PRN Mild Pain (1 - 3)  oxyCODONE    IR 5 milliGRAM(s) Oral every 4 hours PRN Severe Pain (7 - 10)  oxyCODONE    IR 2.5 milliGRAM(s) Oral every 4 hours PRN Moderate Pain (4 - 6)    --------------------------------------------------------------------------------------

## 2023-05-27 NOTE — PROGRESS NOTE ADULT - ASSESSMENT
83y Male POD 1 s/p EVAR with Fem-Fem Bypass. Intra-op patient with vtach. Cardiology on board and aware. Post-op, patient received total 4g magnesium and is on Slick 0.4 for low SPB/MAP.    Plan:  - Pain control PRN  - Regular diet  - DVT ppx  - PT/OOB    Vascular Surgery  [id number]. 83y Male POD 1 s/p EVAR with Fem-Fem Bypass. Intra-op patient with vtach. Cardiology on board and aware. Post-op, patient received total 4g magnesium and is on Slick 0.4 for low SPB/MAP.    Plan:  - Would recommend PM CBC to trend downtrending CBC and PLTs  - Pain control PRN  - Regular diet  - DVT ppx  - PT/OOB    Vascular Surgery  [id number]. 83y Male POD 1 s/p EVAR with Fem-Fem Bypass. Intra-op patient with vtach. Cardiology on board and aware. Post-op, patient received total 4g magnesium and is on Slick 0.4 for low SPB/MAP.    Plan:  - Would recommend PM CBC to trend downtrending CBC and PLTs  - Would hold Lovenox PPx  - Pain control PRN  - Regular diet  - DVT ppx  - PT/OOB    Vascular Surgery  [id number].

## 2023-05-27 NOTE — DISCHARGE NOTE PROVIDER - NSDCMRMEDTOKEN_GEN_ALL_CORE_FT
acetaminophen 325 mg oral tablet: 2 tab(s) orally every 6 hours, As needed, Mild Pain (1 - 3)  aspirin 81 mg oral delayed release tablet: 1 tab(s) orally once a day  atorvastatin 80 mg oral tablet: 1 tab(s) orally once a day (at bedtime)  carvedilol 3.125 mg oral tablet: 1 tab(s) orally 2 times a day  ezetimibe 10 mg oral tablet: 1 orally once a day  fluticasone 50 mcg inhalation powder: 1 puff(s) inhaled 2 times a day  furosemide 20 mg oral tablet: 1 tab(s) orally once a day  isosorbide mononitrate 30 mg oral tablet, extended release: 1 orally once a day  lisinopril 10 mg oral tablet: 1 orally once a day  omega-3 polyunsaturated fatty acids 500 mg oral capsule: 1 orally once a day  omeprazole 40 mg oral delayed release capsule: 1 cap(s) orally once a day  oxyBUTYnin 5 mg oral tablet: 1 orally 2 times a day   acetaminophen 325 mg oral tablet: 2 tab(s) orally every 6 hours, As needed, Mild Pain (1 - 3)  aspirin 81 mg oral delayed release tablet: 1 tab(s) orally once a day  atorvastatin 80 mg oral tablet: 1 tab(s) orally once a day (at bedtime)  carvedilol 3.125 mg oral tablet: 1 tab(s) orally 2 times a day  ezetimibe 10 mg oral tablet: 1 orally once a day  fluticasone 50 mcg inhalation powder: 1 puff(s) inhaled 2 times a day  furosemide 20 mg oral tablet: 1 tab(s) orally once a day  isosorbide mononitrate 30 mg oral tablet, extended release: 1 orally once a day  lisinopril 10 mg oral tablet: 1 orally once a day  omega-3 polyunsaturated fatty acids 500 mg oral capsule: 1 orally once a day  omeprazole 40 mg oral delayed release capsule: 1 cap(s) orally once a day  oxyBUTYnin 5 mg oral tablet: 1 orally 2 times a day  pantoprazole 40 mg oral delayed release tablet: 1 tab(s) orally once a day (before a meal)  Chastity Walker: Please provide with Chastity Walker based off of Physical Therapy recommendations post-operatively

## 2023-05-27 NOTE — DISCHARGE NOTE PROVIDER - CARE PROVIDER_API CALL
Raoul Tellez  Vascular Surgery  [address] [facility], [address]  [place] [address]  Phone: [phone]  Fax: [phone]  Follow Up Time: 2 weeks

## 2023-05-27 NOTE — DISCHARGE NOTE PROVIDER - NSDCCPTREATMENT_GEN_ALL_CORE_FT
PRINCIPAL PROCEDURE  Procedure: Endovascular repair of unruptured aortoiliac vessel using [id number] stent graft  Findings and Treatment:

## 2023-05-27 NOTE — CHART NOTE - NSCHARTNOTEFT_GEN_A_CORE
Incidental findings are findings on history, physical examination, lab work, and imaging that are not directly related to the patient's chief complaint and cause for hospital admission.     1. The incidental findings for this patient are (please list):  Right lower lobe nodule with irregular border measuring 0.8 cm on CT scan which may represent infection vs malignancy.       2. Were these findings explained to the patient and/or their family (in the event that either the patient requests communication with their family or the patient is unable to understand or remember the findings and plan)?    The findings were explained to the patient and son and questions were answered.      3. Was a copy of the lab work/ imaging report given to the patient and/or their family?    Yes, a copy of the imaging report was provided.      4. Was the patient asked whether they can follow up with their PMD regarding this finding? If the patient says no, or does not have a PMD, you must identify a provider (i.e. Medicine Clinic or specialist) with whom the patient will follow up.    The patient will follow up with their PCP, confirmed with son.    5. Was the finding documented on the discharge summary?    The finding will be documented at discharge on the discharge summary.
POST-OPERATIVE CHECK    Patient is s/p EVAR with Fem-Fem Bypass    Subjective:  Patient reports pain more in the arm with the radial A-line than in the groins or abdomen/chest  Denies chest pain, shortness of breath, nausea, vomiting  Is currently still lying flat  Patient is asymptomatic, but intermittently having ectopy    PAST MEDICAL & SURGICAL HISTORY:  Hypertension  HLD (hyperlipidemia)  Systolic dysfunction  BEE (dyspnea on exertion)  Former cigarette smoker  S/P CABG x 3  s/p NAWAF procedure (LV endoaneurysmorraphy  Stented coronary artery  PCI RCA 4/2016  AICD (automatic cardioverter/defibrillator) present      Objective:  Vital Signs Last 24 Hrs  T(C): 36.6 (25 May 2023 12:05), Max: 36.6 (24 May 2023 17:14)  T(F): 97.9 (25 May 2023 12:05), Max: 97.9 (25 May 2023 12:05)  HR: 62 (25 May 2023 15:00) (56 - 101)  BP: 100/50 (25 May 2023 15:00) (70/34 - 119/66)  BP(mean): 72 (25 May 2023 15:00) (46 - 85)  RR: 17 (25 May 2023 15:00) (16 - 18)  SpO2: 99% (25 May 2023 15:00) (95% - 100%)    Parameters below as of 25 May 2023 14:30  Patient On (Oxygen Delivery Method): nasal cannula      PHYSICAL EXAM:  Constitutional: NAD, lying flat  Neuro: AOx3,   Respiratory:  Normal respiratory effort on RA  Cardiovascular: Warm and well-perfused. EKG bedside shows mild ectopy.  Gastrointestinal: Abdomen soft, non distended, non tender  : Soft, Aquacell dressing c/d/i. L>R edema  Extremities: No edema, no calf or thigh tenderness.  Vascular: B/l Palp PT, Dopplerable DPs      I&O's Detail    24 May 2023 07:01  -  25 May 2023 07:00  --------------------------------------------------------  IN:    Oral Fluid: 720 mL  Total IN: 720 mL    OUT:    Voided (mL): 2350 mL  Total OUT: 2350 mL    Total NET: -1630 mL      25 May 2023 07:01  -  25 May 2023 15:43  --------------------------------------------------------  IN:    IV PiggyBack: 50 mL    IV PiggyBack: 50 mL    Lactated Ringers: 100 mL    Phenylephrine: 10 mL  Total IN: 210 mL    OUT:    Indwelling Catheter - Urethral (mL): 600 mL  Total OUT: 600 mL    Total NET: -390 mL            LABS:                        10.3   6.24  )-----------( 149      ( 25 May 2023 12:58 )             32.0     05-25    138  |  101  |  10  ----------------------------<  156<H>  4.2   |  21<L>  |  0.83    Ca    8.2<L>      25 May 2023 12:58  Phos  4.2     05-25  Mg     1.8     05-25      PT/INR - ( 25 May 2023 01:52 )   PT: 13.5 sec;   INR: 1.17 ratio         PTT - ( 25 May 2023 01:52 )  PTT:50.4 sec    CAPILLARY BLOOD GLUCOSE      aspirin  chewable 81  carvedilol 3.125  furosemide    Tablet 20  isosorbide   mononitrate ER Tablet (IMDUR) 30  lisinopril 10  phenylephrine    Infusion 0.5      Assessment:  The patient is a 83y Male who is now several hours post-op from an EVAR with Fem-Fem Bypass. Intra-op patient with vtach. Cardiology on board and aware. Post-op, patient received total 4g magnesium and is on Slick 0.4 for low SPB/MAP.    Plan:  - Continue lying flat for 6 hours total  - Currently on Slick 0.4 for low SBP/MAP. Will titrate as needed while in PACU.  - Follow closely with cardiology -> Dr. Chung  - Pain control PRN  - Regular diet  - DVT ppx  - PT and Mcguire out tomorrow    Vascular Surgery  [phone]
SICU Transfer Note    Transfer from: SICU  Transfer to: 9M [id number]  Accepting physican: Geoff      SICU COURSE:  83-year-old gentleman PMH HTN, HLD, CM, CAD ( S/P 3 vessel CABG 2002), PCI to RCA 4/11/16, life vest x 3 months, former smoker ( 80 pack years),  EF on ECHO 20-25%, AICD now s/p EVAR with fem-fem bypass requiring vasopressor support with phenylephrine to maintain MAP within goal of >65mmHg. SICU consulted for hemodynamic support with vasopressors.      ASSESSMENT & PLAN:   - Regular diet  - DVT ppx  - PT/OOB  - Bowel Regimen  - D/C home in the AM    For Follow-up:  - Monitor CBC  - Monitor blood pressure    Vital Signs Last 24 Hrs  T(C): 36.7 (27 May 2023 14:55), Max: 37.2 (27 May 2023 11:00)  T(F): 98 (27 May 2023 14:55), Max: 99 (27 May 2023 11:00)  HR: 64 (27 May 2023 14:55) (59 - 78)  BP: 123/53 (27 May 2023 14:55) (87/49 - 166/51)  BP(mean): 92 (27 May 2023 14:00) (59 - 92)  RR: 22 (27 May 2023 14:55) (10 - 34)  SpO2: 97% (27 May 2023 14:55) (90% - 100%)    Parameters below as of 27 May 2023 14:55  Patient On (Oxygen Delivery Method): room air      I&O's Summary    26 May 2023 07:01  -  27 May 2023 07:00  --------------------------------------------------------  IN: 1310.9 mL / OUT: 1155 mL / NET: 155.9 mL    27 May 2023 07:01  -  27 May 2023 15:02  --------------------------------------------------------  IN: 300 mL / OUT: 300 mL / NET: 0 mL          MEDICATIONS  (STANDING):  albuterol/ipratropium for Nebulization 3 milliLiter(s) Nebulizer every 6 hours  aspirin  chewable 81 milliGRAM(s) Oral daily  atorvastatin 80 milliGRAM(s) Oral at bedtime  bisacodyl Suppository 10 milliGRAM(s) Rectal once  enoxaparin Injectable 40 milliGRAM(s) SubCutaneous every 24 hours  insulin lispro (ADMELOG) corrective regimen sliding scale   SubCutaneous three times a day before meals  insulin lispro (ADMELOG) corrective regimen sliding scale   SubCutaneous at bedtime  pantoprazole    Tablet 40 milliGRAM(s) Oral before breakfast  polyethylene glycol 3350 17 Gram(s) Oral every 24 hours  senna 2 Tablet(s) Oral at bedtime    MEDICATIONS  (PRN):  acetaminophen     Tablet .. 650 milliGRAM(s) Oral every 6 hours PRN Mild Pain (1 - 3)  oxyCODONE    IR 2.5 milliGRAM(s) Oral every 4 hours PRN Moderate Pain (4 - 6)  oxyCODONE    IR 5 milliGRAM(s) Oral every 4 hours PRN Severe Pain (7 - 10)        LABS                                            9.4                   Neurophils% (auto):   x      (05-27 @ 11:08):    8.22 )-----------(100          Lymphocytes% (auto):  x                                             28.5                   Eosinphils% (auto):   x        Manual%: Neutrophils x    ; Lymphocytes x    ; Eosinophils x    ; Bands%: x    ; Blasts x                                    133    |  103    |  14                  Calcium: 7.8   / iCa: x      (05-27 @ 11:08)    ----------------------------<  113       Magnesium: 1.6                              4.1     |  21     |  0.83             Phosphorous: 2.5        ( 05-26 @ 23:05 )   PT: 13.9 sec;   INR: 1.21 ratio  aPTT: 31.9 sec

## 2023-05-28 ENCOUNTER — TRANSCRIPTION ENCOUNTER (OUTPATIENT)
Age: 83
End: 2023-05-28

## 2023-05-28 VITALS
DIASTOLIC BLOOD PRESSURE: 66 MMHG | RESPIRATION RATE: 18 BRPM | TEMPERATURE: 99 F | SYSTOLIC BLOOD PRESSURE: 110 MMHG | OXYGEN SATURATION: 95 % | HEART RATE: 77 BPM

## 2023-05-28 LAB
ALBUMIN SERPL ELPH-MCNC: 2.9 G/DL — LOW (ref 3.3–5)
ALP SERPL-CCNC: 48 U/L — SIGNIFICANT CHANGE UP (ref 40–120)
ALT FLD-CCNC: 19 U/L — SIGNIFICANT CHANGE UP (ref 10–45)
ANION GAP SERPL CALC-SCNC: 11 MMOL/L — SIGNIFICANT CHANGE UP (ref 5–17)
APPEARANCE UR: CLEAR — SIGNIFICANT CHANGE UP
AST SERPL-CCNC: 25 U/L — SIGNIFICANT CHANGE UP (ref 10–40)
BACTERIA # UR AUTO: NEGATIVE — SIGNIFICANT CHANGE UP
BILIRUB SERPL-MCNC: 1.2 MG/DL — SIGNIFICANT CHANGE UP (ref 0.2–1.2)
BILIRUB UR-MCNC: NEGATIVE — SIGNIFICANT CHANGE UP
BUN SERPL-MCNC: 12 MG/DL — SIGNIFICANT CHANGE UP (ref 7–23)
CALCIUM SERPL-MCNC: 7.8 MG/DL — LOW (ref 8.4–10.5)
CHLORIDE SERPL-SCNC: 100 MMOL/L — SIGNIFICANT CHANGE UP (ref 96–108)
CO2 SERPL-SCNC: 22 MMOL/L — SIGNIFICANT CHANGE UP (ref 22–31)
COLOR SPEC: SIGNIFICANT CHANGE UP
CREAT SERPL-MCNC: 0.95 MG/DL — SIGNIFICANT CHANGE UP (ref 0.5–1.3)
DIFF PNL FLD: ABNORMAL
EGFR: 79 ML/MIN/1.73M2 — SIGNIFICANT CHANGE UP
EPI CELLS # UR: 0 /HPF — SIGNIFICANT CHANGE UP
GLUCOSE BLDC GLUCOMTR-MCNC: 117 MG/DL — HIGH (ref 70–99)
GLUCOSE BLDC GLUCOMTR-MCNC: 120 MG/DL — HIGH (ref 70–99)
GLUCOSE BLDC GLUCOMTR-MCNC: 157 MG/DL — HIGH (ref 70–99)
GLUCOSE SERPL-MCNC: 119 MG/DL — HIGH (ref 70–99)
GLUCOSE UR QL: NEGATIVE — SIGNIFICANT CHANGE UP
HCT VFR BLD CALC: 29 % — LOW (ref 39–50)
HGB BLD-MCNC: 9.8 G/DL — LOW (ref 13–17)
KETONES UR-MCNC: NEGATIVE — SIGNIFICANT CHANGE UP
LEUKOCYTE ESTERASE UR-ACNC: NEGATIVE — SIGNIFICANT CHANGE UP
MAGNESIUM SERPL-MCNC: 2 MG/DL — SIGNIFICANT CHANGE UP (ref 1.6–2.6)
MCHC RBC-ENTMCNC: 29.6 PG — SIGNIFICANT CHANGE UP (ref 27–34)
MCHC RBC-ENTMCNC: 33.8 GM/DL — SIGNIFICANT CHANGE UP (ref 32–36)
MCV RBC AUTO: 87.6 FL — SIGNIFICANT CHANGE UP (ref 80–100)
NITRITE UR-MCNC: NEGATIVE — SIGNIFICANT CHANGE UP
NRBC # BLD: 0 /100 WBCS — SIGNIFICANT CHANGE UP (ref 0–0)
PH UR: 7 — SIGNIFICANT CHANGE UP (ref 5–8)
PHOSPHATE SERPL-MCNC: 2.8 MG/DL — SIGNIFICANT CHANGE UP (ref 2.5–4.5)
PLATELET # BLD AUTO: 116 K/UL — LOW (ref 150–400)
POTASSIUM SERPL-MCNC: 3.8 MMOL/L — SIGNIFICANT CHANGE UP (ref 3.5–5.3)
POTASSIUM SERPL-SCNC: 3.8 MMOL/L — SIGNIFICANT CHANGE UP (ref 3.5–5.3)
PROT SERPL-MCNC: 5.6 G/DL — LOW (ref 6–8.3)
PROT UR-MCNC: NEGATIVE — SIGNIFICANT CHANGE UP
RBC # BLD: 3.31 M/UL — LOW (ref 4.2–5.8)
RBC # FLD: 14.4 % — SIGNIFICANT CHANGE UP (ref 10.3–14.5)
RBC CASTS # UR COMP ASSIST: 3 /HPF — SIGNIFICANT CHANGE UP (ref 0–4)
SODIUM SERPL-SCNC: 133 MMOL/L — LOW (ref 135–145)
SP GR SPEC: 1.01 — LOW (ref 1.01–1.02)
UROBILINOGEN FLD QL: NEGATIVE — SIGNIFICANT CHANGE UP
WBC # BLD: 8.87 K/UL — SIGNIFICANT CHANGE UP (ref 3.8–10.5)
WBC # FLD AUTO: 8.87 K/UL — SIGNIFICANT CHANGE UP (ref 3.8–10.5)
WBC UR QL: 0 /HPF — SIGNIFICANT CHANGE UP (ref 0–5)

## 2023-05-28 PROCEDURE — 86902 BLOOD TYPE ANTIGEN DONOR EA: CPT

## 2023-05-28 PROCEDURE — 80053 COMPREHEN METABOLIC PANEL: CPT

## 2023-05-28 PROCEDURE — 86850 RBC ANTIBODY SCREEN: CPT

## 2023-05-28 PROCEDURE — 81001 URINALYSIS AUTO W/SCOPE: CPT

## 2023-05-28 PROCEDURE — 85610 PROTHROMBIN TIME: CPT

## 2023-05-28 PROCEDURE — 84100 ASSAY OF PHOSPHORUS: CPT

## 2023-05-28 PROCEDURE — 87040 BLOOD CULTURE FOR BACTERIA: CPT

## 2023-05-28 PROCEDURE — 83735 ASSAY OF MAGNESIUM: CPT

## 2023-05-28 PROCEDURE — 82947 ASSAY GLUCOSE BLOOD QUANT: CPT

## 2023-05-28 PROCEDURE — C1874: CPT

## 2023-05-28 PROCEDURE — 36430 TRANSFUSION BLD/BLD COMPNT: CPT

## 2023-05-28 PROCEDURE — 82803 BLOOD GASES ANY COMBINATION: CPT

## 2023-05-28 PROCEDURE — C1768: CPT

## 2023-05-28 PROCEDURE — 84295 ASSAY OF SERUM SODIUM: CPT

## 2023-05-28 PROCEDURE — 71250 CT THORAX DX C-: CPT

## 2023-05-28 PROCEDURE — 86880 COOMBS TEST DIRECT: CPT

## 2023-05-28 PROCEDURE — 85018 HEMOGLOBIN: CPT

## 2023-05-28 PROCEDURE — 82435 ASSAY OF BLOOD CHLORIDE: CPT

## 2023-05-28 PROCEDURE — 82565 ASSAY OF CREATININE: CPT

## 2023-05-28 PROCEDURE — C1769: CPT

## 2023-05-28 PROCEDURE — 86905 BLOOD TYPING RBC ANTIGENS: CPT

## 2023-05-28 PROCEDURE — 76000 FLUOROSCOPY <1 HR PHYS/QHP: CPT

## 2023-05-28 PROCEDURE — C9399: CPT

## 2023-05-28 PROCEDURE — 85025 COMPLETE CBC W/AUTO DIFF WBC: CPT

## 2023-05-28 PROCEDURE — 83605 ASSAY OF LACTIC ACID: CPT

## 2023-05-28 PROCEDURE — 82962 GLUCOSE BLOOD TEST: CPT

## 2023-05-28 PROCEDURE — 86900 BLOOD TYPING SEROLOGIC ABO: CPT

## 2023-05-28 PROCEDURE — 94640 AIRWAY INHALATION TREATMENT: CPT

## 2023-05-28 PROCEDURE — 83036 HEMOGLOBIN GLYCOSYLATED A1C: CPT

## 2023-05-28 PROCEDURE — C1894: CPT

## 2023-05-28 PROCEDURE — 86922 COMPATIBILITY TEST ANTIGLOB: CPT

## 2023-05-28 PROCEDURE — 85027 COMPLETE CBC AUTOMATED: CPT

## 2023-05-28 PROCEDURE — C8929: CPT

## 2023-05-28 PROCEDURE — P9016: CPT

## 2023-05-28 PROCEDURE — 85014 HEMATOCRIT: CPT

## 2023-05-28 PROCEDURE — 97162 PT EVAL MOD COMPLEX 30 MIN: CPT

## 2023-05-28 PROCEDURE — P9047: CPT

## 2023-05-28 PROCEDURE — 71045 X-RAY EXAM CHEST 1 VIEW: CPT

## 2023-05-28 PROCEDURE — 80048 BASIC METABOLIC PNL TOTAL CA: CPT

## 2023-05-28 PROCEDURE — 84484 ASSAY OF TROPONIN QUANT: CPT

## 2023-05-28 PROCEDURE — 97166 OT EVAL MOD COMPLEX 45 MIN: CPT

## 2023-05-28 PROCEDURE — 93005 ELECTROCARDIOGRAM TRACING: CPT

## 2023-05-28 PROCEDURE — 84132 ASSAY OF SERUM POTASSIUM: CPT

## 2023-05-28 PROCEDURE — C1725: CPT

## 2023-05-28 PROCEDURE — 86901 BLOOD TYPING SEROLOGIC RH(D): CPT

## 2023-05-28 PROCEDURE — 85730 THROMBOPLASTIN TIME PARTIAL: CPT

## 2023-05-28 PROCEDURE — 82330 ASSAY OF CALCIUM: CPT

## 2023-05-28 PROCEDURE — 99285 EMERGENCY DEPT VISIT HI MDM: CPT

## 2023-05-28 PROCEDURE — 86870 RBC ANTIBODY IDENTIFICATION: CPT

## 2023-05-28 PROCEDURE — 71045 X-RAY EXAM CHEST 1 VIEW: CPT | Mod: 26

## 2023-05-28 PROCEDURE — C1887: CPT

## 2023-05-28 PROCEDURE — 36415 COLL VENOUS BLD VENIPUNCTURE: CPT

## 2023-05-28 PROCEDURE — C1889: CPT

## 2023-05-28 RX ORDER — ACETAMINOPHEN 500 MG
1000 TABLET ORAL ONCE
Refills: 0 | Status: COMPLETED | OUTPATIENT
Start: 2023-05-28 | End: 2023-05-28

## 2023-05-28 RX ORDER — VANCOMYCIN HCL 1 G
1000 VIAL (EA) INTRAVENOUS EVERY 12 HOURS
Refills: 0 | Status: DISCONTINUED | OUTPATIENT
Start: 2023-05-28 | End: 2023-05-28

## 2023-05-28 RX ORDER — PIPERACILLIN AND TAZOBACTAM 4; .5 G/20ML; G/20ML
3.38 INJECTION, POWDER, LYOPHILIZED, FOR SOLUTION INTRAVENOUS ONCE
Refills: 0 | Status: COMPLETED | OUTPATIENT
Start: 2023-05-28 | End: 2023-05-28

## 2023-05-28 RX ORDER — PIPERACILLIN AND TAZOBACTAM 4; .5 G/20ML; G/20ML
3.38 INJECTION, POWDER, LYOPHILIZED, FOR SOLUTION INTRAVENOUS ONCE
Refills: 0 | Status: DISCONTINUED | OUTPATIENT
Start: 2023-05-28 | End: 2023-05-28

## 2023-05-28 RX ORDER — PANTOPRAZOLE SODIUM 20 MG/1
1 TABLET, DELAYED RELEASE ORAL
Qty: 0 | Refills: 0 | DISCHARGE
Start: 2023-05-28

## 2023-05-28 RX ADMIN — PANTOPRAZOLE SODIUM 40 MILLIGRAM(S): 20 TABLET, DELAYED RELEASE ORAL at 05:17

## 2023-05-28 RX ADMIN — Medication 3 MILLILITER(S): at 05:17

## 2023-05-28 RX ADMIN — Medication 3 MILLILITER(S): at 11:25

## 2023-05-28 RX ADMIN — Medication 81 MILLIGRAM(S): at 11:25

## 2023-05-28 RX ADMIN — Medication 400 MILLIGRAM(S): at 03:03

## 2023-05-28 RX ADMIN — Medication 1: at 12:45

## 2023-05-28 RX ADMIN — POLYETHYLENE GLYCOL 3350 17 GRAM(S): 17 POWDER, FOR SOLUTION ORAL at 05:17

## 2023-05-28 RX ADMIN — PIPERACILLIN AND TAZOBACTAM 200 GRAM(S): 4; .5 INJECTION, POWDER, LYOPHILIZED, FOR SOLUTION INTRAVENOUS at 08:06

## 2023-05-28 RX ADMIN — ENOXAPARIN SODIUM 40 MILLIGRAM(S): 100 INJECTION SUBCUTANEOUS at 05:17

## 2023-05-28 RX ADMIN — Medication 1000 MILLIGRAM(S): at 04:05

## 2023-05-28 NOTE — PROGRESS NOTE ADULT - PROBLEM SELECTOR PLAN 1
s/p EVAR with Fem-Fem Bypass  Orders per vascular  Monitor Hgb and platelets
planning for EVAR on thursday (5/25/2023)  Vascular surgery follow up   Intermediate to high risk to proceed.  RCRI 3 with 30 day 15% risk of death, MI or cardiac arrest
planning for EVAR on today (5/25/2023)  Vascular surgery follow up   Intermediate to high risk to proceed.  RCRI 3 with 30 day 15% risk of death, MI or cardiac arrest
CT A/P with 8mm RLL spiculated nodule, few additional tiny nodules in both lungs  -Former smoker (at least 80 pack years); concern for malignancy   -CT A/P report also notes prior wedge resection in the lingula - pt denies any lung procedures in the past, may be 2nd to prior CABG   -F/u CT chest.
planning for EVAR on thursday (5/25/2023)  Vascular surgery follow up   Intermediate to high risk to proceed.  RCRI 3 with 30 day 15% risk of death, MI or cardiac arrest
s/p EVAR with Fem-Fem Bypass  Orders per vascular  Monitor Hgb and platelets

## 2023-05-28 NOTE — PROGRESS NOTE ADULT - PROBLEM SELECTOR PROBLEM 2
Ischemic cardiomyopathy
Ischemic cardiomyopathy
AAA (abdominal aortic aneurysm)
Ischemic cardiomyopathy

## 2023-05-28 NOTE — PROGRESS NOTE ADULT - PROBLEM SELECTOR PROBLEM 5
S/P implantation of automatic cardioverter/defibrillator (AICD)
LV (left ventricular) mural thrombus
S/P implantation of automatic cardioverter/defibrillator (AICD)

## 2023-05-28 NOTE — PROVIDER CONTACT NOTE (OTHER) - ASSESSMENT
Pt has 101.5 fever and a 106 HR. Other VSS. Pt denies any chills, sweating or feeling hot. Pt denies any pain. Pt stated he felt weak. Pt blood sugar was 120.

## 2023-05-28 NOTE — PROVIDER CONTACT NOTE (OTHER) - ACTION/TREATMENT ORDERED:
MD Berta Poe at bedside to assess patient. MD Poe ordered labs, blood cultures, urine analysis, chest X ray and IV tylenol.

## 2023-05-28 NOTE — DISCHARGE NOTE NURSING/CASE MANAGEMENT/SOCIAL WORK - NSDCVIVACCINE_GEN_ALL_CORE_FT
influenza, injectable, quadrivalent, preservative free; 13-Apr-2016 14:54; Najma Gr (RN); [employer]; [id number]; IntraMuscular; Deltoid Left.; 0.5 milliLiter(s); VIS (VIS Published: 07-Aug-2015, VIS Presented: 13-Apr-2016);

## 2023-05-28 NOTE — PROGRESS NOTE ADULT - SUBJECTIVE AND OBJECTIVE BOX
TEAM VASCULAR Surgery Daily Progress Note  =====================================================    SUBJECTIVE: Patient seen and examined at bedside on AM rounds. NAD this AM    Patient with fever to 101.5 and tachycardia to 106. Fever workup sent. UA and CXR negative. BCx pending.  Patient otherwise asymptomatic and doing well this AM    --------------------------------------------------------------------------------------  OBJECTIVE:    VITAL SIGNS:  Vital Signs Last 24 Hrs  T(C): 36.6 (28 May 2023 10:06), Max: 38.6 (28 May 2023 01:27)  T(F): 97.9 (28 May 2023 10:06), Max: 101.5 (28 May 2023 01:27)  HR: 61 (28 May 2023 10:06) (60 - 106)  BP: 96/58 (28 May 2023 10:06) (96/58 - 135/64)  BP(mean): 92 (27 May 2023 14:00) (75 - 92)  RR: 18 (28 May 2023 10:06) (18 - 34)  SpO2: 94% (28 May 2023 10:06) (94% - 98%)    Parameters below as of 28 May 2023 10:06  Patient On (Oxygen Delivery Method): room air      --------------------------------------------------------------------------------------    EXAM:  Constitutional: NAD  Neuro: AOx3  Respiratory:  Normal respiratory effort on RA  Cardiovascular: Warm and well-perfused.  Gastrointestinal: Abdomen soft, non distended, non tender  : Soft, Aquacell dressing removed. Incision c/d/i  Extremities: No edema, no calf or thigh tenderness.  Vascular: B/l Palp PT    --------------------------------------------------------------------------------------    LABS:                        9.8    8.87  )-----------( 116      ( 28 May 2023 03:02 )             29.0         133<L>  |  100  |  12  ----------------------------<  119<H>  3.8   |  22  |  0.95    Ca    7.8<L>      28 May 2023 03:02  Phos  2.8       Mg     2.0         TPro  5.6<L>  /  Alb  2.9<L>  /  TBili  1.2  /  DBili  x   /  AST  25  /  ALT  19  /  AlkPhos  48      PT/INR - ( 26 May 2023 23:05 )   PT: 13.9 sec;   INR: 1.21 ratio         PTT - ( 26 May 2023 23:05 )  PTT:31.9 sec  Urinalysis Basic - ( 28 May 2023 03:04 )    Color: Light Yellow / Appearance: Clear / S.008 / pH: x  Gluc: x / Ketone: Negative  / Bili: Negative / Urobili: Negative   Blood: x / Protein: Negative / Nitrite: Negative   Leuk Esterase: Negative / RBC: 3 /hpf / WBC 0 /HPF   Sq Epi: x / Non Sq Epi: x / Bacteria: Negative        --------------------------------------------------------------------------------------    INS AND OUTS:    23 @ 07:01  -  23 @ 07:00  --------------------------------------------------------  IN: 1090 mL / OUT: 1525 mL / NET: -435 mL    23 @ 07:23 @ 10:49  --------------------------------------------------------  IN: 100 mL / OUT: 150 mL / NET: -50 mL        --------------------------------------------------------------------------------------    MEDICATIONS:  MEDICATIONS  (STANDING):  albuterol/ipratropium for Nebulization 3 milliLiter(s) Nebulizer every 6 hours  aspirin  chewable 81 milliGRAM(s) Oral daily  atorvastatin 80 milliGRAM(s) Oral at bedtime  enoxaparin Injectable 40 milliGRAM(s) SubCutaneous every 24 hours  insulin lispro (ADMELOG) corrective regimen sliding scale   SubCutaneous three times a day before meals  insulin lispro (ADMELOG) corrective regimen sliding scale   SubCutaneous at bedtime  pantoprazole    Tablet 40 milliGRAM(s) Oral before breakfast  polyethylene glycol 3350 17 Gram(s) Oral every 24 hours  senna 2 Tablet(s) Oral at bedtime    MEDICATIONS  (PRN):  acetaminophen     Tablet .. 650 milliGRAM(s) Oral every 6 hours PRN Mild Pain (1 - 3)  oxyCODONE    IR 2.5 milliGRAM(s) Oral every 4 hours PRN Moderate Pain (4 - 6)  oxyCODONE    IR 5 milliGRAM(s) Oral every 4 hours PRN Severe Pain (7 - 10)    --------------------------------------------------------------------------------------

## 2023-05-28 NOTE — DISCHARGE NOTE NURSING/CASE MANAGEMENT/SOCIAL WORK - NSDCPEFALRISK_GEN_ALL_CORE
For information on Fall & Injury Prevention, visit: [email or web address] OR  [email or web address] OR  https://www.cdc.gov/steadi/patient.html

## 2023-05-28 NOTE — PROVIDER CONTACT NOTE (OTHER) - BACKGROUND
Pt came into the hospital for a AAA. Pt went to the OR for EVAR on 5/25. Pt had a low BP and was put on a slick gtt. Slick gtt was d/c on 5/27

## 2023-05-28 NOTE — PROGRESS NOTE ADULT - PROBLEM SELECTOR PLAN 3
mild to moderate mitral regurgitation o current echo   well compensated
TTE 5/22/23 with severe LV systolic dysfunction with apex and septum akinesis. LVEF 20-25%. Mild LV diastolic dysfunction. Mild to moderate MR, mild to moderate AI  -Per cards.

## 2023-05-28 NOTE — PROGRESS NOTE ADULT - PROBLEM SELECTOR PROBLEM 1
AAA (abdominal aortic aneurysm)
Lung nodule

## 2023-05-28 NOTE — PROGRESS NOTE ADULT - PROVIDER SPECIALTY LIST ADULT
Internal Medicine
SICU
Vascular Surgery
Internal Medicine
Internal Medicine
Vascular Surgery
Pulmonology
Vascular Surgery
Vascular Surgery
Internal Medicine
SICU
Vascular Surgery
Cardiology

## 2023-05-28 NOTE — PROGRESS NOTE ADULT - PROBLEM SELECTOR PLAN 5
[employer]   interrogated. Full report in front of chart
[facility]   interrogated. Full report in front of chart
[employer]   Will interrogate.
[employer]   interrogated. Full report in front of chart
[facility]   interrogated. Full report in front of chart
CT 5/19 A/P with small amount of low density at the left ventricular apex suspect for thrombus  -TTE 5/22 with no LV thrombus.

## 2023-05-28 NOTE — PROGRESS NOTE ADULT - SUBJECTIVE AND OBJECTIVE BOX
Subjective: Patient seen and examined. No new events except as noted.     REVIEW OF SYSTEMS:    CONSTITUTIONAL:+ weakness, fevers or chills  EYES/ENT: No visual changes;  No vertigo or throat pain   NECK: No pain or stiffness  RESPIRATORY: No cough, wheezing, hemoptysis; No shortness of breath  CARDIOVASCULAR: No chest pain or palpitations  GASTROINTESTINAL: No abdominal or epigastric pain. No nausea, vomiting, or hematemesis; No diarrhea or constipation. No melena or hematochezia.  GENITOURINARY: No dysuria, frequency or hematuria  NEUROLOGICAL: No numbness or weakness  SKIN: No itching, burning, rashes, or lesions   All other review of systems is negative unless indicated above.    MEDICATIONS:  MEDICATIONS  (STANDING):  albuterol/ipratropium for Nebulization 3 milliLiter(s) Nebulizer every 6 hours  aspirin  chewable 81 milliGRAM(s) Oral daily  atorvastatin 80 milliGRAM(s) Oral at bedtime  enoxaparin Injectable 40 milliGRAM(s) SubCutaneous every 24 hours  insulin lispro (ADMELOG) corrective regimen sliding scale   SubCutaneous three times a day before meals  insulin lispro (ADMELOG) corrective regimen sliding scale   SubCutaneous at bedtime  pantoprazole    Tablet 40 milliGRAM(s) Oral before breakfast  polyethylene glycol 3350 17 Gram(s) Oral every 24 hours  senna 2 Tablet(s) Oral at bedtime      PHYSICAL EXAM:  T(C): 36.7 (05-28-23 @ 05:59), Max: 38.6 (05-28-23 @ 01:27)  HR: 62 (05-28-23 @ 05:59) (60 - 106)  BP: 98/59 (05-28-23 @ 05:59) (98/59 - 135/64)  RR: 18 (05-28-23 @ 05:59) (18 - 34)  SpO2: 98% (05-28-23 @ 05:59) (95% - 98%)  Wt(kg): --  I&O's Summary    27 May 2023 07:01  -  28 May 2023 07:00  --------------------------------------------------------  IN: 1090 mL / OUT: 1525 mL / NET: -435 mL            Appearance: NAD	  HEENT:   Dry  oral mucosa, PERRL, EOMI	  Lymphatic: No lymphadenopathy , no edema  Cardiovascular: Normal S1 S2, No JVD, No murmurs , Peripheral pulses palpable 2+ bilaterally  Respiratory: decreased bs 	  Gastrointestinal:  Soft, Non-tender, + BS	  Skin: No rashes, No ecchymoses, No cyanosis, warm to touch  Musculoskeletal: Normal range of motion, normal strength  : Soft, Aquacell dressing c/d/i. L>R edema  Extremities: No edema, no calf or thigh tenderness.  Vascular: B/l Palp PT        LABS:    CARDIAC MARKERS:                                9.8    8.87  )-----------( 116      ( 28 May 2023 03:02 )             29.0     05-28    133<L>  |  100  |  12  ----------------------------<  119<H>  3.8   |  22  |  0.95    Ca    7.8<L>      28 May 2023 03:02  Phos  2.8     05-28  Mg     2.0     05-28    TPro  5.6<L>  /  Alb  2.9<L>  /  TBili  1.2  /  DBili  x   /  AST  25  /  ALT  19  /  AlkPhos  48  05-28          TELEMETRY: 	AV paced     ECG:  	  RADIOLOGY:   DIAGNOSTIC TESTING:  [ ] Echocardiogram:  [ ]  Catheterization:  [ ] Stress Test:    OTHER:

## 2023-05-28 NOTE — PROGRESS NOTE ADULT - PROBLEM SELECTOR PLAN 2
s/p ICD   cont with meds  appears compensated.
s/p ICD    Intra-op patient with vtach  replete K > 4 and Mg > 2  cont with meds  appears compensated.
s/p ICD   cont with meds  appears compensated.
CT A/P 5/19 with 7.1 cm infrarenal abdominal aortic aneurysm  -Per vascular  -OR planned for Thursday AM 5/25/23  -No pulmonary contraindications to planned procedure.
s/p ICD    Intra-op patient with vtach  replete K > 4 and Mg > 2  cont with meds  appears compensated.
s/p ICD   cont with meds  appears compensated.

## 2023-05-28 NOTE — PROGRESS NOTE ADULT - REASON FOR ADMISSION
elective AAA 7.1cm repair

## 2023-05-28 NOTE — PROGRESS NOTE ADULT - PROBLEM SELECTOR PLAN 4
Former 2-3 ppd smoker x at least 40 years  -F/u CT chest  -Suggest eventual outpatient PFTs  -Normoxic, no complaints of dyspnea  -VBG acceptable  -Suggest perioperative bronchodilators.
Stable   cont with meds.
EMT/paramedic

## 2023-05-28 NOTE — DISCHARGE NOTE NURSING/CASE MANAGEMENT/SOCIAL WORK - PATIENT PORTAL LINK FT
You can access the FollowMyHealth Patient Portal offered by [facility] by registering at the following website: [email or web address]. By joining [facility]’s FollowMyHealth portal, you will also be able to view your health information using other applications (apps) compatible with our system.

## 2023-05-28 NOTE — PROGRESS NOTE ADULT - ASSESSMENT
83y Male POD 1 s/p EVAR with Fem-Fem Bypass. Intra-op patient with vtach. Cardiology on board and aware. Post-op, patient received total 4g magnesium and is on Slick 0.4 for low SPB/MAP.    Plan:  - Fever workup sent, but patient asymptomatic without fevers since the single episode this AM.  - Pain control PRN  - Regular diet  - DVT ppx  - OOB  - D/C home today    Vascular Surgery  [phone].

## 2023-05-30 RX ORDER — FLUTICASONE PROPIONATE AND SALMETEROL 100; 50 UG/1; UG/1
100-50 POWDER RESPIRATORY (INHALATION)
Qty: 1 | Refills: 0 | Status: ACTIVE | COMMUNITY
Start: 2023-05-30 | End: 1900-01-01

## 2023-06-03 LAB
CULTURE RESULTS: SIGNIFICANT CHANGE UP
CULTURE RESULTS: SIGNIFICANT CHANGE UP
SPECIMEN SOURCE: SIGNIFICANT CHANGE UP
SPECIMEN SOURCE: SIGNIFICANT CHANGE UP

## 2023-06-06 ENCOUNTER — APPOINTMENT (OUTPATIENT)
Dept: VASCULAR SURGERY | Facility: CLINIC | Age: 83
End: 2023-06-06
Payer: MEDICAID

## 2023-06-06 PROCEDURE — 99024 POSTOP FOLLOW-UP VISIT: CPT

## 2023-06-06 NOTE — DISCUSSION/SUMMARY
[FreeTextEntry1] : Currently patient is doing well.\par Patient will return to the office in 1 month for a duplex study.\par Patient to make appointment with pulmonary regarding right lower lobe nodule found on preoperative CT scan.

## 2023-06-06 NOTE — PHYSICAL EXAM
[2+] : left 2+ [Ankle Swelling (On Exam)] : not present [Varicose Veins Of Lower Extremities] : not present [] : not present [de-identified] : Appears well

## 2023-06-26 ENCOUNTER — APPOINTMENT (OUTPATIENT)
Dept: PULMONOLOGY | Facility: CLINIC | Age: 83
End: 2023-06-26
Payer: MEDICAID

## 2023-06-26 VITALS
HEART RATE: 69 BPM | DIASTOLIC BLOOD PRESSURE: 52 MMHG | WEIGHT: 113 LBS | OXYGEN SATURATION: 98 % | SYSTOLIC BLOOD PRESSURE: 108 MMHG | BODY MASS INDEX: 18.24 KG/M2 | TEMPERATURE: 97.5 F

## 2023-06-26 PROCEDURE — 99204 OFFICE O/P NEW MOD 45 MIN: CPT | Mod: 25

## 2023-06-26 PROCEDURE — 94729 DIFFUSING CAPACITY: CPT

## 2023-06-26 PROCEDURE — 94060 EVALUATION OF WHEEZING: CPT

## 2023-06-26 PROCEDURE — 94727 GAS DIL/WSHOT DETER LNG VOL: CPT

## 2023-06-27 NOTE — PHYSICAL EXAM
[No Acute Distress] : no acute distress [Normal Oropharynx] : normal oropharynx [Normal Appearance] : normal appearance [No Neck Mass] : no neck mass [Normal Rate/Rhythm] : normal rate/rhythm [Normal S1, S2] : normal s1, s2 [No Resp Distress] : no resp distress [Clear to Auscultation Bilaterally] : clear to auscultation bilaterally [No HSM] : no hsm [Normal Gait] : normal gait [No Clubbing] : no clubbing [No Cyanosis] : no cyanosis [No Edema] : no edema [No Focal Deficits] : no focal deficits [Oriented x3] : oriented x3 [Normal Affect] : normal affect

## 2023-06-27 NOTE — PROCEDURE
[FreeTextEntry1] : CT Chest 5/24/23: 8 mm nodule, with irregular borders, in the right lower lobe.  See report.\par \par PFT 6/26/23: No obstruction.  Mild restriction.  Mild reduction in diffusion.  No improvement after bronchodilator.

## 2023-06-27 NOTE — HISTORY OF PRESENT ILLNESS
[Former] : former [TextBox_4] : He is an 83-year-old former smoker with a history of hypertension, hyperlipidemia, coronary artery disease, status post CABG, abdominal aortic aneurysm, status post EVAR on 5/25/2023 who was referred here for a pulmonary nodule noted on CT scan when he was in the hospital.\par \par He denies any cough, wheezing or shortness of breath.  No hemoptysis.  No chest pain or chest pressure.  No prior CT scans of the chest according to him. [Awakes Unrefreshed] : does not awaken unrefreshed [Difficulty Maintaining Sleep] : does not have difficulty maintaining sleep

## 2023-06-27 NOTE — REVIEW OF SYSTEMS
[Fever] : no fever [Fatigue] : no fatigue [Chills] : no chills [Nasal Congestion] : no nasal congestion [Cough] : no cough [Hemoptysis] : no hemoptysis [Chest Tightness] : no chest tightness [Sputum] : no sputum [Pleuritic Pain] : no pleuritic pain [Chest Discomfort] : no chest discomfort [Nasal Discharge] : no nasal discharge [GERD] : no gerd [Arthralgias] : no arthralgias [Myalgias] : no myalgias [Rash] : no rash [Anemia] : no anemia [Seizures] : no seizures [Anxiety] : no anxiety [Diabetes] : no diabetes [Thyroid Problem] : no thyroid problem

## 2023-06-27 NOTE — DISCUSSION/SUMMARY
[FreeTextEntry1] : He is an 83 year-old former smoker with a history of hypertension, hyperlipidemia, coronary artery disease, status post CABG, S/P ICD, 7.1 cm abdominal aortic aneurysm (S/P EVAR on 5/25/2023) who was referred here for a pulmonary nodule noted on CT scan when he was in the hospital.\par \par He denies any cough, wheezing or shortness of breath.  No hemoptysis.  No chest pain or chest pressure.  No prior CT scans of the chest according to him.\par \par The CT images were reviewed.  The nodule in the right lower lobe is suspicious for malignancy.  A follow-up CT will be obtained at a 3-month interval.  He was given a requisition.  He was made aware that if there is any increase in size then a biopsy would be a consideration.

## 2023-07-11 ENCOUNTER — APPOINTMENT (OUTPATIENT)
Dept: VASCULAR SURGERY | Facility: CLINIC | Age: 83
End: 2023-07-11
Payer: MEDICAID

## 2023-07-11 PROCEDURE — 93926 LOWER EXTREMITY STUDY: CPT

## 2023-07-11 PROCEDURE — 93978 VASCULAR STUDY: CPT

## 2023-07-11 PROCEDURE — 99024 POSTOP FOLLOW-UP VISIT: CPT

## 2023-07-11 PROCEDURE — 93924 LWR XTR VASC STDY BILAT: CPT

## 2023-07-11 NOTE — DISCUSSION/SUMMARY
[FreeTextEntry1] : Currently patient is doing well.\par In the office today patient underwent a duplex study which shows no evidence of endoleak.  The graft is patent.  The femoral-femoral bypass is patent.  In addition, patient underwent lower extremity arterial Dopplers due to complaints of stiffness and they were within normal limits.  At this time no intervention is necessary.  Patient may follow-up with me in 6 months with a repeat duplex study.  Patient will continue to follow-up with the pulmonologist for a small lung nodule.

## 2023-07-11 NOTE — REASON FOR VISIT
[de-identified] : Endovascular repair of abdominal aortic aneurysm using aorto unilimb with femorofemoral bypass [de-identified] : Status post endovascular repair abdominal aortic aneurysm.  Patient is currently doing well.  He is ambulating without difficulty.

## 2023-07-11 NOTE — PHYSICAL EXAM
[2+] : left 2+ [Ankle Swelling (On Exam)] : not present [Varicose Veins Of Lower Extremities] : not present [] : not present [de-identified] : Appears well

## 2023-08-01 LAB
ALBUMIN SERPL ELPH-MCNC: 3.9 G/DL
ALP BLD-CCNC: 73 U/L
ALT SERPL-CCNC: 26 U/L
ANION GAP SERPL CALC-SCNC: 10 MMOL/L
AST SERPL-CCNC: 34 U/L
BILIRUB SERPL-MCNC: 0.6 MG/DL
BUN SERPL-MCNC: 16 MG/DL
CALCIUM SERPL-MCNC: 9.2 MG/DL
CHLORIDE SERPL-SCNC: 106 MMOL/L
CO2 SERPL-SCNC: 25 MMOL/L
CREAT SERPL-MCNC: 1.09 MG/DL
EGFR: 67 ML/MIN/1.73M2
GLUCOSE SERPL-MCNC: 92 MG/DL
POTASSIUM SERPL-SCNC: 4.9 MMOL/L
PROT SERPL-MCNC: 6.9 G/DL
SODIUM SERPL-SCNC: 141 MMOL/L

## 2023-09-05 ENCOUNTER — NON-APPOINTMENT (OUTPATIENT)
Age: 83
End: 2023-09-05

## 2023-09-29 ENCOUNTER — APPOINTMENT (OUTPATIENT)
Dept: CT IMAGING | Facility: IMAGING CENTER | Age: 83
End: 2023-09-29
Payer: MEDICAID

## 2023-09-29 ENCOUNTER — OUTPATIENT (OUTPATIENT)
Dept: OUTPATIENT SERVICES | Facility: HOSPITAL | Age: 83
LOS: 1 days | End: 2023-09-29
Payer: MEDICAID

## 2023-09-29 DIAGNOSIS — Z00.8 ENCOUNTER FOR OTHER GENERAL EXAMINATION: ICD-10-CM

## 2023-09-29 DIAGNOSIS — Z95.5 PRESENCE OF CORONARY ANGIOPLASTY IMPLANT AND GRAFT: Chronic | ICD-10-CM

## 2023-09-29 DIAGNOSIS — R91.1 SOLITARY PULMONARY NODULE: ICD-10-CM

## 2023-09-29 DIAGNOSIS — Z95.810 PRESENCE OF AUTOMATIC (IMPLANTABLE) CARDIAC DEFIBRILLATOR: Chronic | ICD-10-CM

## 2023-09-29 DIAGNOSIS — Z95.1 PRESENCE OF AORTOCORONARY BYPASS GRAFT: Chronic | ICD-10-CM

## 2023-09-29 PROCEDURE — 71250 CT THORAX DX C-: CPT

## 2023-09-29 PROCEDURE — 71250 CT THORAX DX C-: CPT | Mod: 26

## 2023-10-06 ENCOUNTER — APPOINTMENT (OUTPATIENT)
Dept: PULMONOLOGY | Facility: CLINIC | Age: 83
End: 2023-10-06

## 2023-10-11 ENCOUNTER — APPOINTMENT (OUTPATIENT)
Dept: PULMONOLOGY | Facility: CLINIC | Age: 83
End: 2023-10-11
Payer: MEDICAID

## 2023-10-11 VITALS
HEART RATE: 92 BPM | OXYGEN SATURATION: 99 % | WEIGHT: 104 LBS | BODY MASS INDEX: 16.79 KG/M2 | DIASTOLIC BLOOD PRESSURE: 62 MMHG | SYSTOLIC BLOOD PRESSURE: 104 MMHG | TEMPERATURE: 96.8 F

## 2023-10-11 PROCEDURE — 99214 OFFICE O/P EST MOD 30 MIN: CPT

## 2023-10-25 NOTE — DISCHARGE NOTE PROVIDER - NPI NUMBER (FOR SYSADMIN USE ONLY) :
[[id number]] Dermal Autograft Text: The defect edges were debeveled with a #15 scalpel blade.  Given the location of the defect, shape of the defect and the proximity to free margins a dermal autograft was deemed most appropriate.  Using a sterile surgical marker, the primary defect shape was transferred to the donor site. The area thus outlined was incised deep to adipose tissue with a #15 scalpel blade.  The harvested graft was then trimmed of adipose and epidermal tissue until only dermis was left.  The skin graft was then placed in the primary defect and oriented appropriately.

## 2024-02-22 ENCOUNTER — APPOINTMENT (OUTPATIENT)
Dept: VASCULAR SURGERY | Facility: CLINIC | Age: 84
End: 2024-02-22
Payer: MEDICAID

## 2024-02-22 PROCEDURE — 93926 LOWER EXTREMITY STUDY: CPT

## 2024-02-22 PROCEDURE — 93979 VASCULAR STUDY: CPT

## 2024-02-22 PROCEDURE — 99214 OFFICE O/P EST MOD 30 MIN: CPT

## 2024-02-22 NOTE — HISTORY OF PRESENT ILLNESS
[FreeTextEntry1] : 84-year-old gentleman, former heavy smoker with coronary artery disease status post CABG, AICD status post endovascular repair of abdominal aortic aneurysm using aorto Uni limb with a femoral-femoral bypass.  Patient recently was hospitalized in [facility] with a small MI  He has been well since his return to [place] no back or abdominal pain.

## 2024-02-22 NOTE — PHYSICAL EXAM
[Normal Breath Sounds] : Normal breath sounds [Normal Rate and Rhythm] : normal rate and rhythm [2+] : left 2+ [Ankle Swelling (On Exam)] : not present [Varicose Veins Of Lower Extremities] : not present [] : not present [Abdomen Tenderness] : ~T ~M No abdominal tenderness [No Rash or Lesion] : No rash or lesion [Alert] : alert [Calm] : calm [de-identified] : Appears well

## 2024-04-11 ENCOUNTER — APPOINTMENT (OUTPATIENT)
Dept: PULMONOLOGY | Facility: CLINIC | Age: 84
End: 2024-04-11

## 2024-04-19 ENCOUNTER — OUTPATIENT (OUTPATIENT)
Dept: OUTPATIENT SERVICES | Facility: HOSPITAL | Age: 84
LOS: 1 days | Discharge: HOME | End: 2024-04-19
Payer: MEDICAID

## 2024-04-19 DIAGNOSIS — Z95.5 PRESENCE OF CORONARY ANGIOPLASTY IMPLANT AND GRAFT: Chronic | ICD-10-CM

## 2024-04-19 DIAGNOSIS — Z98.890 OTHER SPECIFIED POSTPROCEDURAL STATES: Chronic | ICD-10-CM

## 2024-04-19 DIAGNOSIS — I25.2 OLD MYOCARDIAL INFARCTION: ICD-10-CM

## 2024-04-19 DIAGNOSIS — Z95.810 PRESENCE OF AUTOMATIC (IMPLANTABLE) CARDIAC DEFIBRILLATOR: Chronic | ICD-10-CM

## 2024-04-19 DIAGNOSIS — Z95.1 PRESENCE OF AORTOCORONARY BYPASS GRAFT: Chronic | ICD-10-CM

## 2024-04-19 LAB
ANION GAP SERPL CALC-SCNC: 13 MMOL/L — SIGNIFICANT CHANGE UP (ref 7–14)
BUN SERPL-MCNC: 23 MG/DL — SIGNIFICANT CHANGE UP (ref 7–23)
CALCIUM SERPL-MCNC: 8.8 MG/DL — SIGNIFICANT CHANGE UP (ref 8.4–10.5)
CHLORIDE SERPL-SCNC: 102 MMOL/L — SIGNIFICANT CHANGE UP (ref 98–107)
CO2 SERPL-SCNC: 20 MMOL/L — LOW (ref 22–31)
CREAT SERPL-MCNC: 1.18 MG/DL — SIGNIFICANT CHANGE UP (ref 0.5–1.3)
EGFR: 61 ML/MIN/1.73M2 — SIGNIFICANT CHANGE UP
GLUCOSE SERPL-MCNC: 91 MG/DL — SIGNIFICANT CHANGE UP (ref 70–99)
HCT VFR BLD CALC: 33 % — LOW (ref 39–50)
HGB BLD-MCNC: 10.7 G/DL — LOW (ref 13–17)
MCHC RBC-ENTMCNC: 27.6 PG — SIGNIFICANT CHANGE UP (ref 27–34)
MCHC RBC-ENTMCNC: 32.4 GM/DL — SIGNIFICANT CHANGE UP (ref 32–36)
MCV RBC AUTO: 85.3 FL — SIGNIFICANT CHANGE UP (ref 80–100)
NRBC # BLD: 0 /100 WBCS — SIGNIFICANT CHANGE UP (ref 0–0)
NRBC # FLD: 0 K/UL — SIGNIFICANT CHANGE UP (ref 0–0)
PLATELET # BLD AUTO: 186 K/UL — SIGNIFICANT CHANGE UP (ref 150–400)
POTASSIUM SERPL-MCNC: 4.8 MMOL/L — SIGNIFICANT CHANGE UP (ref 3.5–5.3)
POTASSIUM SERPL-SCNC: 4.8 MMOL/L — SIGNIFICANT CHANGE UP (ref 3.5–5.3)
RBC # BLD: 3.87 M/UL — LOW (ref 4.2–5.8)
RBC # FLD: 15 % — HIGH (ref 10.3–14.5)
SODIUM SERPL-SCNC: 135 MMOL/L — SIGNIFICANT CHANGE UP (ref 135–145)
WBC # BLD: 6.91 K/UL — SIGNIFICANT CHANGE UP (ref 3.8–10.5)
WBC # FLD AUTO: 6.91 K/UL — SIGNIFICANT CHANGE UP (ref 3.8–10.5)

## 2024-04-19 PROCEDURE — 93455 CORONARY ART/GRFT ANGIO S&I: CPT | Mod: 26

## 2024-04-19 PROCEDURE — 93010 ELECTROCARDIOGRAM REPORT: CPT

## 2024-04-19 RX ORDER — CARVEDILOL PHOSPHATE 80 MG/1
3.12 CAPSULE, EXTENDED RELEASE ORAL ONCE
Refills: 0 | Status: COMPLETED | OUTPATIENT
Start: 2024-04-19 | End: 2024-04-19

## 2024-04-19 RX ORDER — LISINOPRIL 2.5 MG/1
1 TABLET ORAL
Refills: 0 | DISCHARGE

## 2024-04-19 RX ORDER — SODIUM CHLORIDE 9 MG/ML
3 INJECTION INTRAMUSCULAR; INTRAVENOUS; SUBCUTANEOUS EVERY 8 HOURS
Refills: 0 | Status: DISCONTINUED | OUTPATIENT
Start: 2024-04-19 | End: 2024-04-19

## 2024-04-19 RX ADMIN — CARVEDILOL PHOSPHATE 3.12 MILLIGRAM(S): 80 CAPSULE, EXTENDED RELEASE ORAL at 09:34

## 2024-04-19 NOTE — H&P CARDIOLOGY - NSICDXPASTMEDICALHX_GEN_ALL_CORE_FT
PAST MEDICAL HISTORY:  CAD (coronary artery disease)     BEE (dyspnea on exertion)     Former cigarette smoker     H/O heart artery stent     HLD (hyperlipidemia)     Hypertension     NSTEMI (non-ST elevation myocardial infarction)     Systolic CHF, chronic     Systolic dysfunction

## 2024-04-19 NOTE — H&P CARDIOLOGY - HISTORY OF PRESENT ILLNESS
59 year old male with HTN, hyperlipidemia, AAA endovascular repair, chronic systolic CHF with ICD, known CAD with 3V CABG with LV endoaneurysmorraphy in [place] 2002 (SVG to LAD, SVG to Ramus, SVG to PDA) with subsequent PTCA/resolute stent SVG to Ramus 4/2016 who was doing well until November 2023 when he was vacationing in [facility] and developed acute SOB, presented to nearest Hospital requiring intubation and advised he had a NSTEMI.  Patient was treated medically, extubated and discharged.  [place] returned to [place], has been doing well, walking >10blocks daily.  Admits to some SOB walking up 3 flights of stairs, relieved with rest.  Denies chest pain, dizziness, synocpe, edema, orthopnea and PND.   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 Hypokineiss of the basal to mid anterior wall and basal inferolateral to basal inferior wall.   In light of patients CAD history, symptoms and above hospital events there is high suspicion for CAD progression. Patient is now referred to [facility] for a cardiac catheterization with possible PTCA/stent.     Referring MD: Pau   84 year old male with HTN, hyperlipidemia, AAA endovascular repair, chronic systolic CHF with ICD, known CAD with 3V CABG with LV endoaneurysmorraphy in [place] 2002 (SVG to LAD, SVG to Ramus, SVG to PDA) with subsequent PTCA/resolute stent SVG to Ramus 4/2016 who was doing well until November 2023 when he was vacationing in [facility] and developed acute SOB, presented to nearest Hospital requiring intubation and advised he had a NSTEMI.  Patient was treated medically, extubated and discharged.  [place] returned to [place], has been doing well, walking >10blocks daily.  Admits to some SOB walking up 3 flights of stairs, relieved with rest.  Denies chest pain, dizziness, synocpe, edema, orthopnea and PND.   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 Hypokineiss of the basal to mid anterior wall and basal inferolateral to basal inferior wall.   In light of patients CAD history, symptoms and above hospital events there is high suspicion for CAD progression. Patient is now referred to [facility] for a cardiac catheterization with possible PTCA/stent.     Referring MD: Pau

## 2024-04-19 NOTE — H&P CARDIOLOGY - COMMENTS
pre cath hydration protocol reviewed, NO IVF given due to systolic dysfunction with recent intubation and diuretic therapy

## 2024-04-19 NOTE — H&P CARDIOLOGY - NSICDXPASTSURGICALHX_GEN_ALL_CORE_FT
PAST SURGICAL HISTORY:  AICD (automatic cardioverter/defibrillator) present     History of AAA (abdominal aortic aneurysm) repair     S/P CABG x 3 s/p NAWAF procedure (LV endoaneurysmorraphy    Stented coronary artery PCI RCA 4/2016

## 2024-04-24 ENCOUNTER — INPATIENT (INPATIENT)
Facility: HOSPITAL | Age: 84
LOS: 2 days | Discharge: HOME HEALTH | End: 2024-04-27
Attending: HOSPITALIST | Admitting: HOSPITALIST
Payer: MEDICAID

## 2024-04-24 VITALS
HEART RATE: 68 BPM | SYSTOLIC BLOOD PRESSURE: 110 MMHG | TEMPERATURE: 98 F | OXYGEN SATURATION: 93 % | RESPIRATION RATE: 18 BRPM | DIASTOLIC BLOOD PRESSURE: 75 MMHG

## 2024-04-24 DIAGNOSIS — Z95.1 PRESENCE OF AORTOCORONARY BYPASS GRAFT: Chronic | ICD-10-CM

## 2024-04-24 DIAGNOSIS — Z95.810 PRESENCE OF AUTOMATIC (IMPLANTABLE) CARDIAC DEFIBRILLATOR: Chronic | ICD-10-CM

## 2024-04-24 DIAGNOSIS — Z98.890 OTHER SPECIFIED POSTPROCEDURAL STATES: Chronic | ICD-10-CM

## 2024-04-24 DIAGNOSIS — Z95.5 PRESENCE OF CORONARY ANGIOPLASTY IMPLANT AND GRAFT: Chronic | ICD-10-CM

## 2024-04-24 LAB
ALBUMIN SERPL ELPH-MCNC: 3.6 G/DL — SIGNIFICANT CHANGE UP (ref 3.3–5)
ALP SERPL-CCNC: 67 U/L — SIGNIFICANT CHANGE UP (ref 40–120)
ALT FLD-CCNC: 9 U/L — SIGNIFICANT CHANGE UP (ref 4–41)
ANION GAP SERPL CALC-SCNC: 11 MMOL/L — SIGNIFICANT CHANGE UP (ref 7–14)
APTT BLD: 32.8 SEC — SIGNIFICANT CHANGE UP (ref 24.5–35.6)
AST SERPL-CCNC: 17 U/L — SIGNIFICANT CHANGE UP (ref 4–40)
BASE EXCESS BLDV CALC-SCNC: -0.6 MMOL/L — SIGNIFICANT CHANGE UP (ref -2–3)
BASOPHILS # BLD AUTO: 0.01 K/UL — SIGNIFICANT CHANGE UP (ref 0–0.2)
BASOPHILS NFR BLD AUTO: 0.2 % — SIGNIFICANT CHANGE UP (ref 0–2)
BILIRUB SERPL-MCNC: 0.4 MG/DL — SIGNIFICANT CHANGE UP (ref 0.2–1.2)
BLOOD GAS VENOUS COMPREHENSIVE RESULT: SIGNIFICANT CHANGE UP
BUN SERPL-MCNC: 26 MG/DL — HIGH (ref 7–23)
CALCIUM SERPL-MCNC: 8.9 MG/DL — SIGNIFICANT CHANGE UP (ref 8.4–10.5)
CHLORIDE BLDV-SCNC: 105 MMOL/L — SIGNIFICANT CHANGE UP (ref 96–108)
CHLORIDE SERPL-SCNC: 105 MMOL/L — SIGNIFICANT CHANGE UP (ref 98–107)
CK MB BLD-MCNC: 3.4 % — HIGH (ref 0–2.5)
CK MB CFR SERPL CALC: 2.4 NG/ML — SIGNIFICANT CHANGE UP
CK SERPL-CCNC: 71 U/L — SIGNIFICANT CHANGE UP (ref 30–200)
CO2 BLDV-SCNC: 26.8 MMOL/L — HIGH (ref 22–26)
CO2 SERPL-SCNC: 23 MMOL/L — SIGNIFICANT CHANGE UP (ref 22–31)
CREAT SERPL-MCNC: 1.31 MG/DL — HIGH (ref 0.5–1.3)
EGFR: 54 ML/MIN/1.73M2 — LOW
EOSINOPHIL # BLD AUTO: 0.14 K/UL — SIGNIFICANT CHANGE UP (ref 0–0.5)
EOSINOPHIL NFR BLD AUTO: 2.1 % — SIGNIFICANT CHANGE UP (ref 0–6)
GAS PNL BLDV: 135 MMOL/L — LOW (ref 136–145)
GAS PNL BLDV: SIGNIFICANT CHANGE UP
GLUCOSE BLDV-MCNC: 96 MG/DL — SIGNIFICANT CHANGE UP (ref 70–99)
GLUCOSE SERPL-MCNC: 96 MG/DL — SIGNIFICANT CHANGE UP (ref 70–99)
HCO3 BLDV-SCNC: 25 MMOL/L — SIGNIFICANT CHANGE UP (ref 22–29)
HCT VFR BLD CALC: 31.2 % — LOW (ref 39–50)
HCT VFR BLDA CALC: 30 % — LOW (ref 39–51)
HGB BLD CALC-MCNC: 9.9 G/DL — LOW (ref 12.6–17.4)
HGB BLD-MCNC: 10.1 G/DL — LOW (ref 13–17)
IANC: 4.09 K/UL — SIGNIFICANT CHANGE UP (ref 1.8–7.4)
IMM GRANULOCYTES NFR BLD AUTO: 0.3 % — SIGNIFICANT CHANGE UP (ref 0–0.9)
INR BLD: 1.16 RATIO — SIGNIFICANT CHANGE UP (ref 0.85–1.18)
LACTATE BLDV-MCNC: 1.2 MMOL/L — SIGNIFICANT CHANGE UP (ref 0.5–2)
LYMPHOCYTES # BLD AUTO: 1.76 K/UL — SIGNIFICANT CHANGE UP (ref 1–3.3)
LYMPHOCYTES # BLD AUTO: 26.7 % — SIGNIFICANT CHANGE UP (ref 13–44)
MCHC RBC-ENTMCNC: 28 PG — SIGNIFICANT CHANGE UP (ref 27–34)
MCHC RBC-ENTMCNC: 32.4 GM/DL — SIGNIFICANT CHANGE UP (ref 32–36)
MCV RBC AUTO: 86.4 FL — SIGNIFICANT CHANGE UP (ref 80–100)
MONOCYTES # BLD AUTO: 0.56 K/UL — SIGNIFICANT CHANGE UP (ref 0–0.9)
MONOCYTES NFR BLD AUTO: 8.5 % — SIGNIFICANT CHANGE UP (ref 2–14)
NEUTROPHILS # BLD AUTO: 4.09 K/UL — SIGNIFICANT CHANGE UP (ref 1.8–7.4)
NEUTROPHILS NFR BLD AUTO: 62.2 % — SIGNIFICANT CHANGE UP (ref 43–77)
NRBC # BLD: 0 /100 WBCS — SIGNIFICANT CHANGE UP (ref 0–0)
NRBC # FLD: 0 K/UL — SIGNIFICANT CHANGE UP (ref 0–0)
NT-PROBNP SERPL-SCNC: 5235 PG/ML — HIGH
PCO2 BLDV: 47 MMHG — SIGNIFICANT CHANGE UP (ref 42–55)
PH BLDV: 7.34 — SIGNIFICANT CHANGE UP (ref 7.32–7.43)
PLATELET # BLD AUTO: 189 K/UL — SIGNIFICANT CHANGE UP (ref 150–400)
PO2 BLDV: 27 MMHG — SIGNIFICANT CHANGE UP (ref 25–45)
POTASSIUM BLDV-SCNC: 5 MMOL/L — SIGNIFICANT CHANGE UP (ref 3.5–5.1)
POTASSIUM SERPL-MCNC: 5.1 MMOL/L — SIGNIFICANT CHANGE UP (ref 3.5–5.3)
POTASSIUM SERPL-SCNC: 5.1 MMOL/L — SIGNIFICANT CHANGE UP (ref 3.5–5.3)
PROT SERPL-MCNC: 7.1 G/DL — SIGNIFICANT CHANGE UP (ref 6–8.3)
PROTHROM AB SERPL-ACNC: 13 SEC — SIGNIFICANT CHANGE UP (ref 9.5–13)
RBC # BLD: 3.61 M/UL — LOW (ref 4.2–5.8)
RBC # FLD: 15.4 % — HIGH (ref 10.3–14.5)
SAO2 % BLDV: 31.5 % — LOW (ref 67–88)
SODIUM SERPL-SCNC: 139 MMOL/L — SIGNIFICANT CHANGE UP (ref 135–145)
TROPONIN T, HIGH SENSITIVITY RESULT: 112 NG/L — CRITICAL HIGH
TROPONIN T, HIGH SENSITIVITY RESULT: 81 NG/L — CRITICAL HIGH
WBC # BLD: 6.58 K/UL — SIGNIFICANT CHANGE UP (ref 3.8–10.5)
WBC # FLD AUTO: 6.58 K/UL — SIGNIFICANT CHANGE UP (ref 3.8–10.5)

## 2024-04-24 PROCEDURE — 99285 EMERGENCY DEPT VISIT HI MDM: CPT

## 2024-04-24 PROCEDURE — 71045 X-RAY EXAM CHEST 1 VIEW: CPT | Mod: 26

## 2024-04-24 RX ORDER — FUROSEMIDE 40 MG
20 TABLET ORAL ONCE
Refills: 0 | Status: DISCONTINUED | OUTPATIENT
Start: 2024-04-24 | End: 2024-04-25

## 2024-04-24 RX ORDER — ACETAMINOPHEN 500 MG
1000 TABLET ORAL ONCE
Refills: 0 | Status: COMPLETED | OUTPATIENT
Start: 2024-04-24 | End: 2024-04-24

## 2024-04-24 RX ADMIN — Medication 400 MILLIGRAM(S): at 19:18

## 2024-04-24 NOTE — CONSULT NOTE ADULT - NS ATTEND AMEND GEN_ALL_CORE FT
The patient was seen and examined with the Cardiology Consultation Teaching Service.   Family at bedside    He is a 84-year-old man with coronary artery disease, prior CABG and PCI, and AAA with repair, who was previously hospitalized in [facility] requiring intubation for what sounds like decompensated heart failure, and recently underwent coronary angiography several days ago at this hospital. Coronary angiography demonstrated high-moderate in-stent restenosis of the prior stent in the SVG-Ramus. Intervention was deferred and medical therapy was recommended.     Today the patient returns with worsening dyspnea and orthopnea, with some associated chest discomfort.    No current chest pain  No dyspnea at rest  No orthopnea or PND  No palpitations or syncope    No changes to his medications.  Taking furosemide 20mg PO daily    PMH/PSH:  Coronary artery disease    Coronary artery bypass (SVG-LAD, SVG-Ramus, SVG-PDA)    Percutanteous coronary intervention to SVG-Ramus  Stage C heart failure with reduced LVEF 20%    LV aneurysmectomy    Dual chamber pacemaker with ICD lead  Hypertension  Dyslipidemia  Abdominal aortic aneurysm with endovascular repair    Comfortable-appearing man in no acute distress  Alert and oriented  Afebrile  Vital signs stable  BP is low normal  No carotid bruits  JVP is mildly elevated  Minimal rales  Normal heart sounds  Extremities are warm and perfused  No peripheral edema     Normocytic anemia Hb 10  GFR 54    Hs-troponin 96, 112, 81  Pro-BNP 5235    ECG demonstrates sinus rhythm with V-pacing, occasional native beat  CXR demonstrates pulmonary congestion    Echocardiography demonstrates severe LV systolic function, LVEF 20-25%, LVEDD 8.1, mild-moderate MR, mild-moderate AI    Impression and Recommendations:   84-year-old man with coronary artery disease, prior CABG and PCI, chronic heart failure with reduced LV function, and AAA with repair, who presents with worsening dyspnea and evidence of pulmonary congestion concerning for acute decompensated heart failure.     It is unclear if there was an acute precipitant to this patient's presentation after being well several days ago. His symptoms and clinical history are more concerning for acute decompensated heart failure than acute coronary syndrome and I would treat him as such.     Please continue intravenous diuretics for a goal net negative 1-2L daily.   The patient is warm and well-perfused.  I would continue carvedilol and spironolactone.   I would consider changing ramipril to losartan to assist in possible transition to ARNI, though the patient's low normal blood pressure may be an limiting factor.     Please investigate whether the patient will have access to SGLT2-inhibitors, which we would recommend starting prior to discharge. Plan to discharge this patient on a higher oral maintenance diuretic dose.    Continue aspirin, clopidorel and atorvastatin for his known coronary disease and prior PCI.    Pillo Hackett MD [facility] FACP  Cardiology  [phone]

## 2024-04-24 NOTE — ED ADULT TRIAGE NOTE - PAIN: PRESENCE, MLM
Subjective:      Patient ID: Hernandez Ross is a 54 y.o. female. 4-week follow-up on hypothyroidism secondary to thyroidectomy for papillary thyroid carcinoma status post surgery iodine scan 2 years ago labs were stable thyroid function test thyroglobulin level was less than 0.1 patient also had labs showing increase hemoglobin level and hyperglycemia no history of diabetes before no A1c to review  Other  This is a chronic (Hypothyroidism) problem. The current episode started more than 1 year ago. The problem has been waxing and waning. Exacerbated by: Thyroidectomy. Treatments tried: Synthroid. The treatment provided moderate relief. Results for Zulema Hartley (MRN [id number]) as of 4/7/2021 10:14   Ref.  Range 3/10/2021 09:49 3/10/2021 09:54   Sodium Latest Ref Range: 135 - 144 mEq/L  141   Potassium Latest Ref Range: 3.4 - 4.9 mEq/L  4.2   Chloride Latest Ref Range: 95 - 107 mEq/L  100   CO2 Latest Ref Range: 20 - 31 mEq/L  25   BUN Latest Ref Range: 6 - 20 mg/dL  19   Creatinine Latest Ref Range: 0.50 - 0.90 mg/dL  1.08 (H)   Anion Gap Latest Ref Range: 9 - 15 mEq/L  16 (H)   GFR Non- Latest Ref Range: >60   52.7 (L)   GFR  Latest Ref Range: >60   >60.0   Glucose Latest Ref Range: 70 - 99 mg/dL  112 (H)   Calcium Latest Ref Range: 8.5 - 9.9 mg/dL  9.6   TSH Latest Ref Range: 0.440 - 3.860 uIU/mL  2.050   T4 Free Latest Ref Range: 0.84 - 1.68 ng/dL 1.61    Thyroglobulin Ab Latest Ref Range: 0.0 - 4.0 IU/mL <0.9    Thyroglobulin by LC-MS/MS, Serum/Plasma Latest Ref Range: 1.3 - 98.4 ng/mL Not Applicable    THYROGLOBULIN, SERUM OR PLASMA Latest Ref Range: 1.3 - 31.8 ng/mL 0.1 (L)      Patient Active Problem List   Diagnosis    Postoperative hypothyroidism    Papillary thyroid carcinoma (HCC)    PAF (paroxysmal atrial fibrillation) (HCC)     Allergies   Allergen Reactions    Lisinopril Other (See Comments)       Current Outpatient Medications:     pyridoxine (B-6) 100 MG tablet, Take 50 mg by mouth daily, Disp: , Rfl:     levothyroxine (SYNTHROID) 200 MCG tablet, 1 po 6 days weeks and 1 1/2 po Sunday, Disp: 50 tablet, Rfl: 11    hydroCHLOROthiazide (HYDRODIURIL) 12.5 MG tablet, Take 1 tablet by mouth daily, Disp: , Rfl:     metoprolol succinate (TOPROL XL) 100 MG extended release tablet, Take 150 mg by mouth 2 times daily, Disp: , Rfl:     Cyanocobalamin (B-12) 1000 MCG SUBL, Place under the tongue, Disp: , Rfl:     folic acid (FOLVITE) 357 MCG tablet, Take 800 mcg by mouth daily, Disp: , Rfl:     apixaban (ELIQUIS) 5 MG TABS tablet, Take by mouth 2 times daily, Disp: , Rfl:       Review of Systems   Cardiovascular: Negative. Endocrine: Negative. All other systems reviewed and are negative. Vitals:    04/07/21 1004   BP: (!) 136/96   Pulse: 83   SpO2: 93%   Weight: (!) 330 lb (149.7 kg)   Height: 5' 6\" (1.676 m)       Objective:   Physical Exam  Vitals signs reviewed. Constitutional:       Appearance: Normal appearance. She is obese. HENT:      Head: Normocephalic and atraumatic. Nose: Nose normal.   Eyes:      Extraocular Movements: Extraocular movements intact. Neck:      Musculoskeletal: Normal range of motion and neck supple. Cardiovascular:      Rate and Rhythm: Normal rate. Musculoskeletal: Normal range of motion. Neurological:      General: No focal deficit present. Mental Status: She is alert. Psychiatric:         Mood and Affect: Mood normal.         Assessment:       Diagnosis Orders   1. Hypothyroidism, unspecified type  T4, Free    TSH without Reflex   2. Papillary thyroid carcinoma ([facility] [address]     3. Postoperative hypothyroidism     4.  Hyperglycemia  Basic Metabolic Panel    Hemoglobin A1C           Plan:      Orders Placed This Encounter   Procedures    Basic Metabolic Panel     Standing Status:   Future     Standing Expiration Date:   4/7/2022    Hemoglobin A1C     Standing Status:   Future     Standing Expiration Date:   4/7/2022  T4, Free     Standing Status:   Future     Standing Expiration Date:   4/7/2022    TSH without Reflex     Standing Status:   Future     Standing Expiration Date:   4/7/2022     Continue Synthroid 200 mcg daily patient to have labs for thyroid function test BMP A1c prior to next visit advised to lower carb intake sweet intake increase activity lose weight        Beto Souza MD complains of pain/discomfort

## 2024-04-24 NOTE — ED PROVIDER NOTE - CLINICAL SUMMARY MEDICAL DECISION MAKING FREE TEXT BOX
84-year-old male with history of HTN, HLD, AAA status post endovascular pair, HFrEF with AICD, CAD with three-vessel CABG, recent cardiac catheterization 5 days ago with multivessel stenosis approximately 60% of max, most recent EF approximately 20 to 25% presented to the emergency department with acute onset crushing substernal chest pain radiating to the left and right chest since 3:30 PM.  Patient reports pain is currently 5 out of 10 emergency department.  Took aspirin and nitro.  Also associated with shortness of breath.  Denies fever/chills, diaphoresis, abdominal pain, N/V/D.  Was laying in bed when patient developed chest tightness.  Had cardiac cath with Dr. Almanza, medical management was recommended. Differential diagnosis includes but is not limited to ACS vs less likely MSK or GI referred pain. EKG with ST elevation in V1 and V2 with TWI laterally. Cardiology fellow was able to follow-up on another EKG from April 19, 2024 in [place] that was unchanged. Will get screening labs, cardiac enzymes, CX. Dispo pending labs but TBA tele for cardiac investigation. 84-year-old male with history of HTN, HLD, AAA status post endovascular pair, HFrEF with AICD, CAD with three-vessel CABG, recent cardiac catheterization 5 days ago with multivessel stenosis approximately 60% of max, most recent EF approximately 20 to 25% presented to the emergency department with acute onset crushing substernal chest pain radiating to the left and right chest since 3:30 PM.  Patient reports pain is currently 5 out of 10 emergency department.  Took aspirin and nitro.  Also associated with shortness of breath.  Denies fever/chills, diaphoresis, abdominal pain, N/V/D.  Was laying in bed when patient developed chest tightness.  Had cardiac cath with Dr. Almanza, medical management was recommended. Afebrile without systemic signs of infectious process. Hemodynamically stable. Exam with elderly male, no acute distress, with bibasilar rales but no acute respiratory distress, neuro exam nonfocal. Differential diagnosis includes but is not limited to ACS vs less likely dissection, MSK or GI referred pain. EKG with ST elevation in V1 and V2 with TWI laterally. Cardiology fellow was able to follow-up on another EKG from April 19, 2024 in [place] that was unchanged. Will get screening labs, cardiac enzymes, CX. Dispo pending labs but TBA tele for cardiac investigation.

## 2024-04-24 NOTE — ED ADULT TRIAGE NOTE - CHIEF COMPLAINT QUOTE
Pt. c/o crushing chest pain starting today. Denies dizziness, palpitations. hx of bypass x 3, pacemaker, HF (on Plavix). 324 asa and nitro given prior to arrival. SpO2 93 on RA, placed on NC.

## 2024-04-24 NOTE — CONSULT NOTE ADULT - ASSESSMENT
84 year old primary [facility] speaking male, (ALONDRA Bob [id number]) pmhx HTN, hyperlipidemia, AAA endovascular repair, chronic systolic CHF with ICD, known CAD with 3V CABG with LV endoaneurysmorraphy in [place] 2002 (SVG to LAD, SVG to Ramus, SVG to PDA) with subsequent PTCA/resolute stent SVG to Ramus 4/2016 who was doing well until November 2023 when he was vacationing in [facility] and developed acute SOB, presented to nearest Hospital requiring intubation and advised he had a NSTEMI.  Patient was treated medically, extubated and discharged.  Patient returned to [place], has been doing well.  Had cardiac cath with Dr. FREEMAN Almanza on 4/22/24, medical management was recommended.  Today presented to the emergency department with acute onset pressure like chest pain non radiating since 3:30 PM.  Pain started when laying in bed not relieved with rest.  Pain was 8/10 now relieved 1 out of 10.  Received aspirin and 1SL nitro with EMS.  Also associated with shortness of breath.  Reports decrease in urine output today.  Denies fever/chills, diaphoresis, abdominal pain, N/V/D.     # Elevated Troponin  - Patient with elevated CE likely in the setting of recent cardiac cath.    - Cath with SVG to Ramus Stent in the mid graft has moderate ISR. 60%. Patent SVG to LAD. RCA is   - HD stable, not in decompensated HF state  - EKG show showed NSR @73BPM, 1st degree AV block, LVH, repeat V paced @81BPM does not meet sgarbossa criteria  - Serial EKG PRN to assess for ST changes  - Continuous cardiac monitoring to monitor for arrhythmias  - CBC, CMP, coags, HbA1C, TSH, lipids for comorbidities, Trend cardiac enzymes  - EKG and CE not consistent with ACS, would not treat for acs  - On Furosemide 20mg every other day,  BNP elevated, +SOB, with evidence of pulm edema on cxr.  Give Lasix 20mg IVP no and monitor output.  - c/w Dapt (asa, plavix)  - c/w GDMT as tolerated 84 year old primary [facility] speaking male, (ALONDRA Bob [id number]) pmhx HTN, hyperlipidemia, AAA endovascular repair, chronic systolic CHF with ICD, known CAD with 3V CABG with LV endoaneurysmorraphy in [place] 2002 (SVG to LAD, SVG to Ramus, SVG to PDA) with subsequent PTCA/resolute stent SVG to Ramus 4/2016 who was doing well until November 2023 when he was vacationing in [facility] and developed acute SOB, presented to nearest Hospital requiring intubation and advised he had a NSTEMI.  Patient was treated medically, extubated and discharged.  [place] returned to [place], has been doing well.  Had cardiac cath with Dr. FREEMAN Almanza on 4/22/24, medical management was recommended.  Today presented to the emergency department with acute onset pressure like chest pain non radiating since 3:30 PM.  Pain started when laying in bed not relieved with rest.  Pain was 8/10 now relieved 1 out of 10.  Received aspirin and 1SL nitro with EMS.  Also associated with shortness of breath.  Reports decrease in urine output today.  Denies fever/chills, diaphoresis, abdominal pain, N/V/D.     # Elevated Troponin  - Patient with elevated CE likely in the setting of recent cardiac cath.    - Cath with SVG to Ramus Stent in the mid graft has moderate ISR. 60%. Patent SVG to LAD. RCA is   - HD stable, not in decompensated HF state  - EKG show showed NSR @73BPM, 1st degree AV block, LVH, repeat V paced @81BPM does not meet sgarbossa criteria  - Serial EKG PRN to assess for ST changes  - Continuous cardiac monitoring to monitor for arrhythmias  - CBC, CMP, coags, HbA1C, TSH, lipids for comorbidities, Trend cardiac enzymes  - EKG and CE not consistent with ACS, would not treat for acs  - On Furosemide 20mg every other day,  BNP elevated, +SOB, with evidence of pulm edema on cxr.  Give Lasix 20mg IVP no and monitor output.  - c/w Dapt (asa, plavix)  - c/w GDMT as tolerated      Thank you, if any questions or clinical situation changes please call spectra #[id number].  Case discussed with on call KACIE fellow Dr. BEBA Henning   Attending Attestation to follow

## 2024-04-24 NOTE — ED PROVIDER NOTE - PROGRESS NOTE DETAILS
Steven, PGY3: Patient's troponin elevated to 81.  Cardiology consulted.  Pending evaluation by cards and recommendations.  Will repeat troponin. Patient sleeping.  No further chest pain.  Evaluated by cardiology recommends medical treatment Lasix.  Admit to medicine telemetry.

## 2024-04-24 NOTE — CONSULT NOTE ADULT - SUBJECTIVE AND OBJECTIVE BOX
HPI: 84 year old primary [facility] speaking male, (NA Bob [id number]) pmhx HTN, hyperlipidemia, AAA endovascular repair, chronic systolic CHF with ICD, known CAD with 3V CABG with LV endoaneurysmorraphy in [place]  (SVG to LAD, SVG to Ramus, SVG to PDA) with subsequent PTCA/resolute stent SVG to Ramus 2016 who was doing well until 2023 when he was vacationing in [facility] and developed acute SOB, presented to nearest Hospital requiring intubation and advised he had a NSTEMI.  Patient was treated medically, extubated and discharged.  Patient returned to [place], has been doing well.  Had cardiac cath with Dr. FREEMAN Almanza on 24, medical management was recommended.  Today presented to the emergency department with acute onset pressure like chest pain non radiating since 3:30 PM.  Pain started when laying in bed not relieved with rest.  Pain was 8/10 now relieved 1 out of 10.  Received aspirin and 1SL nitro with EMS.  Also associated with shortness of breath.  Reports decrease in urine output today.  Denies fever/chills, diaphoresis, abdominal pain, N/V/D.     Referring MD: Pau    Allergies    No Known Allergies    Intolerances    	  MEDICATIONS:  -Fluticasone-Salmeterol 100-50 MCG/ACT Inhalation Aerosol Powder  -Aspirin EC Low Dose 81 MG Oral Tablet Delayed Release   -Ramipril 5 MG Oral Capsule TAKE 1 CAPSULE ONCE DAILY.  -Clopidogrel Bisulfate 75 MG Oral Tablet TAKE 1 TABLET DAILY.  -Cyproheptadine HCl - 4 MG Oral Tablet TAKE 1 TABLET AT BEDTIME.  -Docusate Sodium 100 MG Oral Capsule   -Furosemide 20 MG Oral Tablet TAKE 1 TABLET DAILY  -Omeprazole 40 MG Oral Capsule Delayed Release  -Spironolactone 25 MG Oral Tablet TAKE 1 TABLET DAILY  -Atorvastatin Calcium 80 MG Oral Tablet TAKE 1 TABLET EVERY DAY  -Carvedilol 6.25 MG Oral Tablet   -Cetirizine HCl - 5 MG Oral TabletTAKE 1 TABLET DAILY      PAST MEDICAL & SURGICAL HISTORY:  Hypertension  HLD (hyperlipidemia)  Systolic dysfunction  BEE (dyspnea on exertion)  Former cigarette smoker  CAD (coronary artery disease)  H/O heart artery stent  Systolic CHF, chronic  NSTEMI (non-ST elevation myocardial infarction)  S/P CABG x 3  s/p NAWAF procedure (LV endoaneurysmorraphy  Stented coronary artery PCI RCA 2016  AICD (automatic cardioverter/defibrillator) present  History of AAA (abdominal aortic aneurysm) repair      FAMILY HISTORY:  Diabetes mellitus (Sibling)      SUBSTANCE USE  Tobacco Usage:  denies  Alcohol Usage: denies  Recreational drugs: denies      REVIEW OF SYSTEMS:  CV: chest pain (+), palpitation (-), orthopnea (-), PND (-), edema (-)  PULM: SOB (+), cough (-), wheezing (-), hemoptysis (-).   CONST: fever (-), chills (-) or fatigue (-)  GI: abdominal distension (-), abdominal pain (-) , nausea/vomiting (-), hematemesis, (-), melena (-), hematochezia (-)  : dysuria (-), frequency (-), hematuria (-).   NEURO: numbness (-), weakness (-), dizziness (-)  SKIN: itching (-), rash (-)  HEENT:  visual changes (-); vertigo or throat pain (-);  neck stiffness (-)   All other review of systems is negative unless indicated above.      T(C): 36.7 (24 @ 18:15), Max: 36.9 (24 @ 17:44)  HR: 81 (24 @ 21:23) (64 - 86)  BP: 102/61 (24 @ 21:23) (94/52 - 110/75)  RR: 16 (24 @ 21:23) (16 - 18)  SpO2: 100% (24 @ 21:23) (93% - 100%)  Wt(kg): --  I&O's Summary      Physical Exam:  General: NAD  Cardiovascular: Normal S1 S2, No JVD, No murmurs, No edema  Respiratory: Lungs clear to auscultation	  Gastrointestinal:  Soft, Non-tender, + BS	  Skin: warm and dry, No rashes, No ecchymoses, No cyanosis	  Extremities:  No clubbing, cyanosis or edema  Vascular: Peripheral pulses palpable 2+ bilaterally    CBC Full  -  ( 2024 18:30 )  WBC Count : 6.58 K/uL  Hemoglobin : 10.1 g/dL  Hematocrit : 31.2 %  Platelet Count - Automated : 189 K/uL  Mean Cell Volume : 86.4 fL  Mean Cell Hemoglobin : 28.0 pg  Mean Cell Hemoglobin Concentration : 32.4 gm/dL  Auto Neutrophil # : 4.09 K/uL  Auto Lymphocyte # : 1.76 K/uL  Auto Monocyte # : 0.56 K/uL  Auto Eosinophil # : 0.14 K/uL  Auto Basophil # : 0.01 K/uL  Auto Neutrophil % : 62.2 %  Auto Lymphocyte % : 26.7 %  Auto Monocyte % : 8.5 %  Auto Eosinophil % : 2.1 %  Auto Basophil % : 0.2 %        139  |  105  |  26<H>  ----------------------------<  96  5.1   |  23  |  1.31<H>    Ca    8.9      2024 18:30    TPro  7.1  /  Alb  3.6  /  TBili  0.4  /  DBili  x   /  AST  17  /  ALT  9   /  AlkPhos  67      proBNP: 5235  Lipid Profile: --  HgA1c: --  TSH: --  CARDIAC MARKERS ( 2024 18:30 )  81 ng/L / x     / x     / 71 U/L / x     / 2.4 ng/mL    EKst-- NSR @73BPM, 1st degree AV block, LVH  2nd-- V paced @81BPM does not meet sgarbossa criteria    Diagnostic testing:  cath:  < from: Cardiac Catheterization (24 @ 07:56) >  Diagnostic Conclusions:     SVG to Ramus Stent in the mid graft has moderate ISR. 60%. Recommend  medical therapy  Patent SVG to LAD   RCA is , unchanged   Recommendations:     Medical therapy      Diagnostic Findings:     Coronary Angiography   The coronary circulation is co-dominant.           Patient: CATHY MARTINES                  MRN: [id number]  Study Date: 2024   07:56 AM      Page 1 of 4          Distal left main: unchanged. There is a 30 % stenosis.      LAD   Left anterior descending artery: There is a 100 % stenosis.      CX   Ostial circumflex: unchanged. There is a 40 % stenosis.      RCA   Distal right coronary artery: unchanged. There is a 100 % stenosis.      Graft Angiography   SVG graft to Proximal left anterior descending: Angiography shows mild  atherosclerosis.  SVG graft to Ramus intermedius: Stent in the mid graft has moderate  ISR. 60%. Recommend medical therapy .      < end of copied text >    echo:  < from: TTE Limited W or WO Ultrasound Enhancing Agent (23 @ 12:34) >  CONCLUSIONS:      1. Technically difficult image quality.   2. Left ventricle suboptimally visualized despite intravenous ultrasonic enhancing agent. Severe left ventricular systolic dysfunction. The septum and the apex are akinetic. Estimated LV ejection fraction approximately 20-25% by visual estimation. No left ventricular thrombus seen.   3. Severely dilated left ventricular cavity size. Abnormal septal motion consistent with right ventricular pacemaker.   4. There is mild (grade 1) left ventricular diastolic dysfunction.   5. The right ventricle is not well visualized. grossly normal right ventricular cavity size.   6. Device lead is visualized in the right heart.   7. Symmetric mitral valve leaflet tethering.   8. Mild to moderate mitral regurgitation.   9. Aortic valve was not well visualized.  10. Trileaflet aortic valve.  11. Mild-to-moderate aortic regurgitation.  12. Compared to the transthoracic echocardiogram performed on 7/15/2016 results are similar on today's study.    < end of copied text >      cxr:    < from: Xray Chest 1 View- PORTABLE-Urgent (24 @ 19:04) >  INTERPRETATION:  EXAMINATION: XR CHEST URGENT    CLINICAL INDICATION: Chest Pain    TECHNIQUE: Single frontal, portable view of the chest was obtained.    COMPARISON: Chest x-ray 2023.    FINDINGS:  Left chest wall AICD.  The heart is normal in size. Median sternotomy.  Perihilar patchy opacities and prominent interstitial markings,   suggestive of pulmonary edema.  No pneumothorax or pleural effusion.  No acute osseous abnormalities.    IMPRESSION:  Perihilar opacities and prominent interstitial markings, suggestive of   pulmonary edema.    < end of copied text >    Thank you, if any questions or clinical situation changes please call spectra #[id number].  Case discussed with on call KACIE fellow     Attending Attestation to follow   HPI: 84 year old primary [facility] speaking male, (NA Bob [id number]) pmhx HTN, hyperlipidemia, AAA endovascular repair, chronic systolic CHF with ICD, known CAD with 3V CABG with LV endoaneurysmorraphy in [place]  (SVG to LAD, SVG to Ramus, SVG to PDA) with subsequent PTCA/resolute stent SVG to Ramus 2016 who was doing well until 2023 when he was vacationing in [facility] and developed acute SOB, presented to nearest Hospital requiring intubation and advised he had a NSTEMI.  Patient was treated medically, extubated and discharged.  Patient returned to [place], has been doing well.  Had cardiac cath with Dr. FREEMAN Almanza on 24, medical management was recommended.  Today presented to the emergency department with acute onset pressure like chest pain non radiating since 3:30 PM.  Pain started when laying in bed not relieved with rest.  Pain was 8/10 now relieved 1 out of 10.  Received aspirin and 1SL nitro with EMS.  Also associated with shortness of breath.  Reports decrease in urine output today.  Denies fever/chills, diaphoresis, abdominal pain, N/V/D.     Referring MD: Pau    Allergies    No Known Allergies    Intolerances    	  MEDICATIONS:  -Fluticasone-Salmeterol 100-50 MCG/ACT Inhalation Aerosol Powder  -Aspirin EC Low Dose 81 MG Oral Tablet Delayed Release   -Ramipril 5 MG Oral Capsule TAKE 1 CAPSULE ONCE DAILY.  -Clopidogrel Bisulfate 75 MG Oral Tablet TAKE 1 TABLET DAILY.  -Cyproheptadine HCl - 4 MG Oral Tablet TAKE 1 TABLET AT BEDTIME.  -Docusate Sodium 100 MG Oral Capsule   -Furosemide 20 MG Oral Tablet TAKE 1 TABLET DAILY  -Omeprazole 40 MG Oral Capsule Delayed Release  -Spironolactone 25 MG Oral Tablet TAKE 1 TABLET DAILY  -Atorvastatin Calcium 80 MG Oral Tablet TAKE 1 TABLET EVERY DAY  -Carvedilol 6.25 MG Oral Tablet   -Cetirizine HCl - 5 MG Oral TabletTAKE 1 TABLET DAILY      PAST MEDICAL & SURGICAL HISTORY:  Hypertension  HLD (hyperlipidemia)  Systolic dysfunction  BEE (dyspnea on exertion)  Former cigarette smoker  CAD (coronary artery disease)  H/O heart artery stent  Systolic CHF, chronic  NSTEMI (non-ST elevation myocardial infarction)  S/P CABG x 3  s/p NAWAF procedure (LV endoaneurysmorraphy  Stented coronary artery PCI RCA 2016  AICD (automatic cardioverter/defibrillator) present  History of AAA (abdominal aortic aneurysm) repair      FAMILY HISTORY:  Diabetes mellitus (Sibling)      SUBSTANCE USE  Tobacco Usage:  denies  Alcohol Usage: denies  Recreational drugs: denies      REVIEW OF SYSTEMS:  CV: chest pain (+), palpitation (-), orthopnea (-), PND (-), edema (-)  PULM: SOB (+), cough (-), wheezing (-), hemoptysis (-).   CONST: fever (-), chills (-) or fatigue (-)  GI: abdominal distension (-), abdominal pain (-) , nausea/vomiting (-), hematemesis, (-), melena (-), hematochezia (-)  : dysuria (-), frequency (-), hematuria (-).   NEURO: numbness (-), weakness (-), dizziness (-)  SKIN: itching (-), rash (-)  HEENT:  visual changes (-); vertigo or throat pain (-);  neck stiffness (-)   All other review of systems is negative unless indicated above.      T(C): 36.7 (24 @ 18:15), Max: 36.9 (24 @ 17:44)  HR: 81 (24 @ 21:23) (64 - 86)  BP: 102/61 (24 @ 21:23) (94/52 - 110/75)  RR: 16 (24 @ 21:23) (16 - 18)  SpO2: 100% (24 @ 21:23) (93% - 100%)  Wt(kg): --  I&O's Summary      Physical Exam:  General: NAD  Cardiovascular: Normal S1 S2, No JVD, No murmurs, No edema  Respiratory: Lungs clear to auscultation	  Gastrointestinal:  Soft, Non-tender, + BS	  Skin: warm and dry, No rashes, No ecchymoses, No cyanosis	  Extremities:  No clubbing, cyanosis or edema  Vascular: Peripheral pulses palpable 2+ bilaterally    CBC Full  -  ( 2024 18:30 )  WBC Count : 6.58 K/uL  Hemoglobin : 10.1 g/dL  Hematocrit : 31.2 %  Platelet Count - Automated : 189 K/uL  Mean Cell Volume : 86.4 fL  Mean Cell Hemoglobin : 28.0 pg  Mean Cell Hemoglobin Concentration : 32.4 gm/dL  Auto Neutrophil # : 4.09 K/uL  Auto Lymphocyte # : 1.76 K/uL  Auto Monocyte # : 0.56 K/uL  Auto Eosinophil # : 0.14 K/uL  Auto Basophil # : 0.01 K/uL  Auto Neutrophil % : 62.2 %  Auto Lymphocyte % : 26.7 %  Auto Monocyte % : 8.5 %  Auto Eosinophil % : 2.1 %  Auto Basophil % : 0.2 %        139  |  105  |  26<H>  ----------------------------<  96  5.1   |  23  |  1.31<H>    Ca    8.9      2024 18:30    TPro  7.1  /  Alb  3.6  /  TBili  0.4  /  DBili  x   /  AST  17  /  ALT  9   /  AlkPhos  67      proBNP: 5235  Lipid Profile: --  HgA1c: --  TSH: --  CARDIAC MARKERS ( 2024 18:30 )  81 ng/L / x     / x     / 71 U/L / x     / 2.4 ng/mL    EKst-- NSR @73BPM, 1st degree AV block, LVH  2nd-- V paced @81BPM does not meet sgarbossa criteria    Diagnostic testing:  cath:  < from: Cardiac Catheterization (24 @ 07:56) >  Diagnostic Conclusions:     SVG to Ramus Stent in the mid graft has moderate ISR. 60%. Recommend  medical therapy  Patent SVG to LAD   RCA is , unchanged   Recommendations:     Medical therapy      Diagnostic Findings:     Coronary Angiography   The coronary circulation is co-dominant.           Patient: CATHY MARTINES                  MRN: [id number]  Study Date: 2024   07:56 AM      Page 1 of 4          Distal left main: unchanged. There is a 30 % stenosis.      LAD   Left anterior descending artery: There is a 100 % stenosis.      CX   Ostial circumflex: unchanged. There is a 40 % stenosis.      RCA   Distal right coronary artery: unchanged. There is a 100 % stenosis.      Graft Angiography   SVG graft to Proximal left anterior descending: Angiography shows mild  atherosclerosis.  SVG graft to Ramus intermedius: Stent in the mid graft has moderate  ISR. 60%. Recommend medical therapy .      < end of copied text >    echo:  < from: TTE Limited W or WO Ultrasound Enhancing Agent (23 @ 12:34) >  CONCLUSIONS:      1. Technically difficult image quality.   2. Left ventricle suboptimally visualized despite intravenous ultrasonic enhancing agent. Severe left ventricular systolic dysfunction. The septum and the apex are akinetic. Estimated LV ejection fraction approximately 20-25% by visual estimation. No left ventricular thrombus seen.   3. Severely dilated left ventricular cavity size. Abnormal septal motion consistent with right ventricular pacemaker.   4. There is mild (grade 1) left ventricular diastolic dysfunction.   5. The right ventricle is not well visualized. grossly normal right ventricular cavity size.   6. Device lead is visualized in the right heart.   7. Symmetric mitral valve leaflet tethering.   8. Mild to moderate mitral regurgitation.   9. Aortic valve was not well visualized.  10. Trileaflet aortic valve.  11. Mild-to-moderate aortic regurgitation.  12. Compared to the transthoracic echocardiogram performed on 7/15/2016 results are similar on today's study.    < end of copied text >      cxr:    < from: Xray Chest 1 View- PORTABLE-Urgent (24 @ 19:04) >  INTERPRETATION:  EXAMINATION: XR CHEST URGENT    CLINICAL INDICATION: Chest Pain    TECHNIQUE: Single frontal, portable view of the chest was obtained.    COMPARISON: Chest x-ray 2023.    FINDINGS:  Left chest wall AICD.  The heart is normal in size. Median sternotomy.  Perihilar patchy opacities and prominent interstitial markings,   suggestive of pulmonary edema.  No pneumothorax or pleural effusion.  No acute osseous abnormalities.    IMPRESSION:  Perihilar opacities and prominent interstitial markings, suggestive of   pulmonary edema.    < end of copied text >

## 2024-04-24 NOTE — ED PROVIDER NOTE - PHYSICAL EXAMINATION
GEN: Patient awake and alert. No acute distress, non-toxic.   Head: Normocephalic, atraumatic.  Neck: Nontender, full ROM.   Eyes: PERRLA b/l. EOMI, no scleral icterus, no conjunctival injection. Moist mucous membranes.  CARDIAC: RRR. Normal S1, S2. No murmur, rubs, or gallops. No peripheral edema noted.  PULM: Speaking in full sentences. CTA B/L no wheeze, rales or rhonchi. No signs of respiratory distress, no accessory muscle usage or nasal flaring.  ABD: Soft, nontender, nondistended. No rebound, no involuntary guarding.   MSK: Moving all extremities spontaneously. Full ROM in extremities. No obvious deformity.  NEURO: A&Ox3, no focal neurological deficits  SKIN: Warm, dry, no rash, no lesions, no open wounds. No urticaria. No jaundice. GEN: Patient awake and alert. No acute distress, non-toxic.   Head: Normocephalic, atraumatic.  Neck: Nontender, full ROM.   Eyes: PERRLA b/l. EOMI, no scleral icterus, no conjunctival injection. Moist mucous membranes.  CARDIAC: RRR. Normal S1, S2. No murmur, rubs, or gallops. No peripheral edema noted.  PULM: Speaking in full sentences. Bibasilar rales. No signs of respiratory distress, no accessory muscle usage or nasal flaring.  ABD: Soft, nontender, nondistended. No rebound, no involuntary guarding.   MSK: Moving all extremities spontaneously. Full ROM in extremities. No obvious deformity.  NEURO: A&Ox3, no focal neurological deficits  SKIN: Warm, dry, no rash, no lesions, no open wounds. No urticaria. No jaundice.

## 2024-04-24 NOTE — ED PROVIDER NOTE - ATTENDING CONTRIBUTION TO CARE
Attending Statement: I have personally seen and examined this patient. I have fully participated in the care of this patient. I have reviewed all pertinent clinical information, including history physical exam, plan and the Resident's note and agree except as noted  "84 year old male with HTN, hyperlipidemia, AAA endovascular repair, chronic systolic CHF with ICD, known CAD with 3V CABG with LV endoaneurysmorraphy in [place] 2002 (SVG to LAD, SVG to Ramus, SVG to PDA) with subsequent PTCA/resolute stent SVG to Ramus 4/2016"    Presents from home with chest pain since this afternoon at 330.  Patient was in bed laying down and developed midsternal chest tightness.  He felt short of breath.  No associated palpitations.  No nausea no vomiting.  EMS was called and received nitroglycerin prior to arrival and an aspirin 324.  Patient still endorsing 5 out of 10 midsternal chest tightness and some shortness of breath.  Does not feel diaphoretic.  No palpitations.  No abdominal pain.  Of note patient recently had a cardiac cath at [facility].  With Dr. Almanza.  Medical management was recommended.  EKG done at had an ST elevation in V1.  V2.  With ST inversion laterally.  Repeat EKG no significant change.  Seems Dr. Almanza who is at its female spoke with cardiology fellow who was able to follow-up on another EKG from April 19, 2024 in [place] that was unchanged.  At this time they recommend labs, medical treatment and will follow.  Patient and wife updated.  Vitals within normal.  Patient laying in bed has no retractions no work of breathing not diaphoretic nontoxic-appearing.  Abdomen soft nontender.  No pedal edema.  Plan continue cardiac monitor, labs, and telemetry.  Hemoglobin stable CMP concerning for BUN/creatinine of 23/1.3 from 2021.1 no significant change.  proBNP 5235 pending troponin.  Chest x-ray was read as perihilar opacities prominence of social markers suggestive of pulmonary edema.  Patient has normal heart failure.  But is not clinically fluid overloaded.

## 2024-04-24 NOTE — ED PROVIDER NOTE - OBJECTIVE STATEMENT
84-year-old male with history of HTN, HLD, AAA status post endovascular pair, HFrEF with AICD, CAD with three-vessel CABG, recent cardiac catheterization 5 days ago with multivessel stenosis approximately 60% of max, most recent EF approximately 20 to 25% presented to the emergency department with acute onset crushing substernal chest pain radiating to the left and right chest since 3:30 PM.  Patient reports pain is currently 5 out of 10 emergency department.  Took aspirin and nitro.  Also associated with shortness of breath.  Denies fever/chills, diaphoresis, abdominal pain, N/V/D.  Was laying in bed when patient developed chest tightness.  Had cardiac cath with Dr. Almanza, medical management was recommended.

## 2024-04-24 NOTE — ED ADULT NURSE NOTE - NSFALLUNIVINTERV_ED_ALL_ED
Plan of care reviewed with pt & spouse, both verbalized understanding, pt progressing with plan of care, denies nausea, bladder spasms tolerable after getting BNO, tolerating PO, reviewed all DC instructions, home meds, scripts, when to call MD, when to follow-up, answered questions.      
Bed/Stretcher in lowest position, wheels locked, appropriate side rails in place/Call bell, personal items and telephone in reach/Instruct patient to call for assistance before getting out of bed/chair/stretcher/Non-slip footwear applied when patient is off stretcher/[place] to call system/Physically safe environment - no spills, clutter or unnecessary equipment/Purposeful proactive rounding/Room/bathroom lighting operational, light cord in reach

## 2024-04-25 ENCOUNTER — APPOINTMENT (OUTPATIENT)
Dept: PULMONOLOGY | Facility: CLINIC | Age: 84
End: 2024-04-25

## 2024-04-25 ENCOUNTER — RESULT REVIEW (OUTPATIENT)
Age: 84
End: 2024-04-25

## 2024-04-25 DIAGNOSIS — R63.8 OTHER SYMPTOMS AND SIGNS CONCERNING FOOD AND FLUID INTAKE: ICD-10-CM

## 2024-04-25 DIAGNOSIS — I10 ESSENTIAL (PRIMARY) HYPERTENSION: ICD-10-CM

## 2024-04-25 DIAGNOSIS — R07.9 CHEST PAIN, UNSPECIFIED: ICD-10-CM

## 2024-04-25 DIAGNOSIS — I50.23 ACUTE ON CHRONIC SYSTOLIC (CONGESTIVE) HEART FAILURE: ICD-10-CM

## 2024-04-25 DIAGNOSIS — I25.10 ATHEROSCLEROTIC HEART DISEASE OF NATIVE CORONARY ARTERY WITHOUT ANGINA PECTORIS: ICD-10-CM

## 2024-04-25 DIAGNOSIS — K21.9 GASTRO-ESOPHAGEAL REFLUX DISEASE WITHOUT ESOPHAGITIS: ICD-10-CM

## 2024-04-25 PROBLEM — I21.4 NON-ST ELEVATION (NSTEMI) MYOCARDIAL INFARCTION: Chronic | Status: ACTIVE | Noted: 2024-04-19

## 2024-04-25 LAB
ADD ON TEST-SPECIMEN IN LAB: SIGNIFICANT CHANGE UP
ADD ON TEST-SPECIMEN IN LAB: SIGNIFICANT CHANGE UP
ALBUMIN SERPL ELPH-MCNC: 3.7 G/DL — SIGNIFICANT CHANGE UP (ref 3.3–5)
ALP SERPL-CCNC: 73 U/L — SIGNIFICANT CHANGE UP (ref 40–120)
ALT FLD-CCNC: 12 U/L — SIGNIFICANT CHANGE UP (ref 4–41)
ANION GAP SERPL CALC-SCNC: 12 MMOL/L — SIGNIFICANT CHANGE UP (ref 7–14)
AST SERPL-CCNC: 19 U/L — SIGNIFICANT CHANGE UP (ref 4–40)
BASOPHILS # BLD AUTO: 0.01 K/UL — SIGNIFICANT CHANGE UP (ref 0–0.2)
BASOPHILS NFR BLD AUTO: 0.1 % — SIGNIFICANT CHANGE UP (ref 0–2)
BILIRUB SERPL-MCNC: 0.5 MG/DL — SIGNIFICANT CHANGE UP (ref 0.2–1.2)
BUN SERPL-MCNC: 22 MG/DL — SIGNIFICANT CHANGE UP (ref 7–23)
CALCIUM SERPL-MCNC: 8.9 MG/DL — SIGNIFICANT CHANGE UP (ref 8.4–10.5)
CHLORIDE SERPL-SCNC: 104 MMOL/L — SIGNIFICANT CHANGE UP (ref 98–107)
CK MB BLD-MCNC: 3.7 % — HIGH (ref 0–2.5)
CK MB CFR SERPL CALC: 2.7 NG/ML — SIGNIFICANT CHANGE UP
CK SERPL-CCNC: 73 U/L — SIGNIFICANT CHANGE UP (ref 30–200)
CO2 SERPL-SCNC: 22 MMOL/L — SIGNIFICANT CHANGE UP (ref 22–31)
CREAT SERPL-MCNC: 1.11 MG/DL — SIGNIFICANT CHANGE UP (ref 0.5–1.3)
EGFR: 65 ML/MIN/1.73M2 — SIGNIFICANT CHANGE UP
EOSINOPHIL # BLD AUTO: 0.18 K/UL — SIGNIFICANT CHANGE UP (ref 0–0.5)
EOSINOPHIL NFR BLD AUTO: 2.2 % — SIGNIFICANT CHANGE UP (ref 0–6)
GLUCOSE SERPL-MCNC: 106 MG/DL — HIGH (ref 70–99)
HCT VFR BLD CALC: 34.7 % — LOW (ref 39–50)
HGB BLD-MCNC: 10.8 G/DL — LOW (ref 13–17)
IANC: 5.34 K/UL — SIGNIFICANT CHANGE UP (ref 1.8–7.4)
IMM GRANULOCYTES NFR BLD AUTO: 0.2 % — SIGNIFICANT CHANGE UP (ref 0–0.9)
LYMPHOCYTES # BLD AUTO: 1.76 K/UL — SIGNIFICANT CHANGE UP (ref 1–3.3)
LYMPHOCYTES # BLD AUTO: 21.9 % — SIGNIFICANT CHANGE UP (ref 13–44)
MAGNESIUM SERPL-MCNC: 1.9 MG/DL — SIGNIFICANT CHANGE UP (ref 1.6–2.6)
MCHC RBC-ENTMCNC: 27.2 PG — SIGNIFICANT CHANGE UP (ref 27–34)
MCHC RBC-ENTMCNC: 31.1 GM/DL — LOW (ref 32–36)
MCV RBC AUTO: 87.4 FL — SIGNIFICANT CHANGE UP (ref 80–100)
MONOCYTES # BLD AUTO: 0.71 K/UL — SIGNIFICANT CHANGE UP (ref 0–0.9)
MONOCYTES NFR BLD AUTO: 8.9 % — SIGNIFICANT CHANGE UP (ref 2–14)
NEUTROPHILS # BLD AUTO: 5.34 K/UL — SIGNIFICANT CHANGE UP (ref 1.8–7.4)
NEUTROPHILS NFR BLD AUTO: 66.7 % — SIGNIFICANT CHANGE UP (ref 43–77)
NRBC # BLD: 0 /100 WBCS — SIGNIFICANT CHANGE UP (ref 0–0)
NRBC # FLD: 0 K/UL — SIGNIFICANT CHANGE UP (ref 0–0)
PHOSPHATE SERPL-MCNC: 3.5 MG/DL — SIGNIFICANT CHANGE UP (ref 2.5–4.5)
PLATELET # BLD AUTO: 197 K/UL — SIGNIFICANT CHANGE UP (ref 150–400)
POTASSIUM SERPL-MCNC: 4.3 MMOL/L — SIGNIFICANT CHANGE UP (ref 3.5–5.3)
POTASSIUM SERPL-SCNC: 4.3 MMOL/L — SIGNIFICANT CHANGE UP (ref 3.5–5.3)
PROT SERPL-MCNC: 7.9 G/DL — SIGNIFICANT CHANGE UP (ref 6–8.3)
RBC # BLD: 3.97 M/UL — LOW (ref 4.2–5.8)
RBC # FLD: 15.4 % — HIGH (ref 10.3–14.5)
SODIUM SERPL-SCNC: 138 MMOL/L — SIGNIFICANT CHANGE UP (ref 135–145)
T4 AB SER-ACNC: 7.49 UG/DL — SIGNIFICANT CHANGE UP (ref 5.1–13)
TROPONIN T, HIGH SENSITIVITY RESULT: 96 NG/L — CRITICAL HIGH
TSH SERPL-MCNC: 8.23 UIU/ML — HIGH (ref 0.27–4.2)
WBC # BLD: 8.02 K/UL — SIGNIFICANT CHANGE UP (ref 3.8–10.5)
WBC # FLD AUTO: 8.02 K/UL — SIGNIFICANT CHANGE UP (ref 3.8–10.5)

## 2024-04-25 PROCEDURE — 12345: CPT | Mod: NC

## 2024-04-25 PROCEDURE — 93306 TTE W/DOPPLER COMPLETE: CPT | Mod: 26

## 2024-04-25 PROCEDURE — 99223 1ST HOSP IP/OBS HIGH 75: CPT

## 2024-04-25 RX ORDER — OMEGA-3 ACID ETHYL ESTERS 1 G
1 CAPSULE ORAL
Refills: 0 | DISCHARGE

## 2024-04-25 RX ORDER — FUROSEMIDE 40 MG
20 TABLET ORAL EVERY 12 HOURS
Refills: 0 | Status: DISCONTINUED | OUTPATIENT
Start: 2024-04-25 | End: 2024-04-27

## 2024-04-25 RX ORDER — ASPIRIN/CALCIUM CARB/MAGNESIUM 324 MG
81 TABLET ORAL DAILY
Refills: 0 | Status: DISCONTINUED | OUTPATIENT
Start: 2024-04-25 | End: 2024-04-27

## 2024-04-25 RX ORDER — ISOSORBIDE MONONITRATE 60 MG/1
30 TABLET, EXTENDED RELEASE ORAL EVERY 24 HOURS
Refills: 0 | Status: DISCONTINUED | OUTPATIENT
Start: 2024-04-25 | End: 2024-04-27

## 2024-04-25 RX ORDER — SACUBITRIL AND VALSARTAN 24; 26 MG/1; MG/1
1 TABLET, FILM COATED ORAL
Refills: 0 | DISCHARGE

## 2024-04-25 RX ORDER — HEPARIN SODIUM 5000 [USP'U]/ML
5000 INJECTION INTRAVENOUS; SUBCUTANEOUS EVERY 12 HOURS
Refills: 0 | Status: DISCONTINUED | OUTPATIENT
Start: 2024-04-25 | End: 2024-04-27

## 2024-04-25 RX ORDER — LANOLIN ALCOHOL/MO/W.PET/CERES
6 CREAM (GRAM) TOPICAL AT BEDTIME
Refills: 0 | Status: DISCONTINUED | OUTPATIENT
Start: 2024-04-26 | End: 2024-04-27

## 2024-04-25 RX ORDER — EZETIMIBE 10 MG/1
1 TABLET ORAL
Refills: 0 | DISCHARGE

## 2024-04-25 RX ORDER — LANOLIN ALCOHOL/MO/W.PET/CERES
6 CREAM (GRAM) TOPICAL ONCE
Refills: 0 | Status: COMPLETED | OUTPATIENT
Start: 2024-04-25 | End: 2024-04-25

## 2024-04-25 RX ORDER — PANTOPRAZOLE SODIUM 20 MG/1
40 TABLET, DELAYED RELEASE ORAL DAILY
Refills: 0 | Status: DISCONTINUED | OUTPATIENT
Start: 2024-04-25 | End: 2024-04-27

## 2024-04-25 RX ORDER — OXYBUTYNIN CHLORIDE 5 MG
1 TABLET ORAL
Refills: 0 | DISCHARGE

## 2024-04-25 RX ORDER — CLOPIDOGREL BISULFATE 75 MG/1
75 TABLET, FILM COATED ORAL DAILY
Refills: 0 | Status: DISCONTINUED | OUTPATIENT
Start: 2024-04-25 | End: 2024-04-27

## 2024-04-25 RX ORDER — CARVEDILOL PHOSPHATE 80 MG/1
3.12 CAPSULE, EXTENDED RELEASE ORAL EVERY 12 HOURS
Refills: 0 | Status: DISCONTINUED | OUTPATIENT
Start: 2024-04-25 | End: 2024-04-27

## 2024-04-25 RX ORDER — CLOPIDOGREL BISULFATE 75 MG/1
1 TABLET, FILM COATED ORAL
Refills: 0 | DISCHARGE

## 2024-04-25 RX ORDER — ACETAMINOPHEN 500 MG
650 TABLET ORAL EVERY 6 HOURS
Refills: 0 | Status: DISCONTINUED | OUTPATIENT
Start: 2024-04-25 | End: 2024-04-27

## 2024-04-25 RX ORDER — ATORVASTATIN CALCIUM 80 MG/1
80 TABLET, FILM COATED ORAL AT BEDTIME
Refills: 0 | Status: DISCONTINUED | OUTPATIENT
Start: 2024-04-25 | End: 2024-04-27

## 2024-04-25 RX ORDER — OMEGA-3 ACID ETHYL ESTERS 1 G
1 CAPSULE ORAL
Refills: 0 | Status: DISCONTINUED | OUTPATIENT
Start: 2024-04-25 | End: 2024-04-27

## 2024-04-25 RX ORDER — OXYBUTYNIN CHLORIDE 5 MG
5 TABLET ORAL EVERY 12 HOURS
Refills: 0 | Status: DISCONTINUED | OUTPATIENT
Start: 2024-04-25 | End: 2024-04-27

## 2024-04-25 RX ORDER — ISOSORBIDE MONONITRATE 60 MG/1
1 TABLET, EXTENDED RELEASE ORAL
Refills: 0 | DISCHARGE

## 2024-04-25 RX ADMIN — CLOPIDOGREL BISULFATE 75 MILLIGRAM(S): 75 TABLET, FILM COATED ORAL at 11:55

## 2024-04-25 RX ADMIN — Medication 1 GRAM(S): at 06:00

## 2024-04-25 RX ADMIN — ATORVASTATIN CALCIUM 80 MILLIGRAM(S): 80 TABLET, FILM COATED ORAL at 21:12

## 2024-04-25 RX ADMIN — ISOSORBIDE MONONITRATE 30 MILLIGRAM(S): 60 TABLET, EXTENDED RELEASE ORAL at 11:54

## 2024-04-25 RX ADMIN — HEPARIN SODIUM 5000 UNIT(S): 5000 INJECTION INTRAVENOUS; SUBCUTANEOUS at 06:00

## 2024-04-25 RX ADMIN — Medication 20 MILLIGRAM(S): at 08:37

## 2024-04-25 RX ADMIN — PANTOPRAZOLE SODIUM 40 MILLIGRAM(S): 20 TABLET, DELAYED RELEASE ORAL at 11:55

## 2024-04-25 RX ADMIN — Medication 6 MILLIGRAM(S): at 23:36

## 2024-04-25 RX ADMIN — Medication 1 GRAM(S): at 21:11

## 2024-04-25 RX ADMIN — Medication 5 MILLIGRAM(S): at 17:33

## 2024-04-25 RX ADMIN — Medication 5 MILLIGRAM(S): at 06:00

## 2024-04-25 RX ADMIN — CARVEDILOL PHOSPHATE 3.12 MILLIGRAM(S): 80 CAPSULE, EXTENDED RELEASE ORAL at 17:34

## 2024-04-25 RX ADMIN — Medication 81 MILLIGRAM(S): at 11:55

## 2024-04-25 RX ADMIN — HEPARIN SODIUM 5000 UNIT(S): 5000 INJECTION INTRAVENOUS; SUBCUTANEOUS at 17:35

## 2024-04-25 RX ADMIN — Medication 20 MILLIGRAM(S): at 21:12

## 2024-04-25 NOTE — H&P ADULT - NSHPPHYSICALEXAM_GEN_ALL_CORE
PHYSICAL EXAM:  GENERAL: NAD, well-developed  HEAD:  Atraumatic, Normocephalic  EYES: EOMI, PERRLA, conjunctiva and sclera clear  NECK: Supple, No JVD  CHEST/LUNG: Mild crackles lower lung fields.   HEART: Regular rate and rhythm; No murmurs, rubs, or gallops  ABDOMEN: Soft, Nontender, Nondistended; Bowel sounds present  EXTREMITIES:  2+ Peripheral Pulses, No clubbing, cyanosis, or edema  PSYCH: AAOx3  NEUROLOGY: non-focal  SKIN: No rashes or lesions

## 2024-04-25 NOTE — PROGRESS NOTE ADULT - PROBLEM SELECTOR PLAN 5
[FreeTextEntry1] : I reviewed the above findings with the patient and her  with visual illustrations. Treatment options for the prolapse were discussed and included doing nothing, Kegel exercises and behavioral modification, a pessary, or surgical correction. Surgically we discussed the abdominal vs the vaginal routes. Abdominally we discussed a hysterectomy and a sacral colpopexy laparoscopically and robotically.  Vaginally we discussed a vaginal hysterectomy, uterosacral suspension, and anterior/posterior repair. Surgically we discussed hysteropexy as well as the use of biologics.  Risks and benefits were discussed. We discussed that if she is interested in surgical correction I would like her to see colorectal surgery to rule out rectal prolapse. We discussed the recurrent urinary tract infection complaint as well as behavioral modification techniques to prevent UTI.  Risks and benefits of vaginal estrogen were also discussed. We discussed that urine dipstick revealed positive blood and we will send off for urinalysis with and microscopy and culture She would like to start with conservative management with physical therapy and Kegel's IUGA patient information on pelvic organ prolapse UTI, vaginal estrogen, Kegel instructions was given to her.  We discussed following up here if she desires further management.  All questions were answered  Diet: DASH/1 L restriction  DVT: HSQ  Dispo: pending clinical course, likely home Diet: DASH/1 L restriction  DVT: HSQ  Dispo: pending clinical course, likely home    Case and plan of care discussed with patient and patient's family at bedside on 4/25.

## 2024-04-25 NOTE — H&P ADULT - PROBLEM SELECTOR PLAN 4
-hold entresto while diuresing  -can c/w coreg, isosorbide mononitrate.   -hold spironolactone for now

## 2024-04-25 NOTE — PROGRESS NOTE ADULT - SUBJECTIVE AND OBJECTIVE BOX
[facility] Department of Hospital Medicine  Cliff Grady MD  Available on MS Teams  Pager: [phone]    Patient is a 84y old  Male who presents with a chief complaint of CHF exacerbation (25 Apr 2024 02:08)    OVERNIGHT EVENTS: No acute events overnight.    SUBJECTIVE: Pt seen and examined at bedside this morning.     ADDITIONAL REVIEW OF SYSTEMS: as above.    MEDICATIONS  (STANDING):  aspirin enteric coated 81 milliGRAM(s) Oral daily  atorvastatin 80 milliGRAM(s) Oral at bedtime  carvedilol 3.125 milliGRAM(s) Oral every 12 hours  clopidogrel Tablet 75 milliGRAM(s) Oral daily  furosemide   Injectable 20 milliGRAM(s) IV Push every 12 hours  heparin   Injectable 5000 Unit(s) SubCutaneous every 12 hours  isosorbide   mononitrate ER Tablet (IMDUR) 30 milliGRAM(s) Oral every 24 hours  omega-3-Acid Ethyl Esters 1 Gram(s) Oral two times a day  oxybutynin 5 milliGRAM(s) Oral every 12 hours  pantoprazole   Suspension 40 milliGRAM(s) Oral daily    MEDICATIONS  (PRN):  acetaminophen     Tablet .. 650 milliGRAM(s) Oral every 6 hours PRN Temp greater or equal to 38C (100.4F), Mild Pain (1 - 3)    CAPILLARY BLOOD GLUCOSE    I&O's Summary    PHYSICAL EXAM:  Vital Signs Last 24 Hrs  T(C): 36.3 (25 Apr 2024 03:16), Max: 36.9 (24 Apr 2024 17:44)  T(F): 97.4 (25 Apr 2024 03:16), Max: 98.4 (24 Apr 2024 17:44)  HR: 75 (25 Apr 2024 10:06) (64 - 86)  BP: 149/63 (25 Apr 2024 10:06) (94/52 - 149/63)  BP(mean): 69 (25 Apr 2024 03:58) (63 - 74)  RR: 18 (25 Apr 2024 10:06) (15 - 18)  SpO2: 99% (25 Apr 2024 10:06) (93% - 100%)    Parameters below as of 25 Apr 2024 10:06  Patient On (Oxygen Delivery Method): nasal cannula  O2 Flow (L/min): 2    CONSTITUTIONAL: NAD, well-developed  HEAD: Normocephalic, atraumatic  EYES: EOMI, PERRL  ENT: no rhinorrhea, no pharyngeal erythema  RESPIRATORY: No increased work of breathing, CTAB, no wheezes or crackles appreciated  CARDIOVASCULAR: RRR, S1 and S2 present, no m/r/g  ABDOMEN: soft, NT, ND, bowel sounds present  EXTREMITIES: No LE edema  MUSCULOSKELETAL: no joint swelling, no tenderness to palpation  NEURO: A&Ox3, moving all extremities    LABS:                        10.8   8.02  )-----------( 197      ( 25 Apr 2024 07:42 )             34.7     04-25    138  |  104  |  22  ----------------------------<  106<H>  4.3   |  22  |  1.11    Ca    8.9      25 Apr 2024 07:42  Phos  3.5     04-25  Mg     1.90     04-25    TPro  7.9  /  Alb  3.7  /  TBili  0.5  /  DBili  x   /  AST  19  /  ALT  12  /  AlkPhos  73  04-25    PT/INR - ( 24 Apr 2024 18:30 )   PT: 13.0 sec;   INR: 1.16 ratio       PTT - ( 24 Apr 2024 18:30 )  PTT:32.8 sec  CARDIAC MARKERS ( 25 Apr 2024 00:45 )  x     / x     / 73 U/L / x     / 2.7 ng/mL  CARDIAC MARKERS ( 24 Apr 2024 21:40 )  x     / x     / 69 U/L / x     / 2.6 ng/mL  CARDIAC MARKERS ( 24 Apr 2024 18:30 )  x     / x     / 71 U/L / x     / 2.4 ng/mL    Urinalysis Basic - ( 25 Apr 2024 07:42 )    Color: x / Appearance: x / SG: x / pH: x  Gluc: 106 mg/dL / Ketone: x  / Bili: x / Urobili: x   Blood: x / Protein: x / Nitrite: x   Leuk Esterase: x / RBC: x / WBC x   Sq Epi: x / Non Sq Epi: x / Bacteria: x    RADIOLOGY & ADDITIONAL TESTS:    Results Reviewed:   Imaging Personally Reviewed:  Electrocardiogram Personally Reviewed:    COORDINATION OF CARE:  Care Discussed with Consultants/Other Providers [Y/N]:  Prior or Outpatient Records Reviewed [Y/N]:   [facility] of Hospital Medicine  Cliff Grady MD  Available on MS Teams  Pager: [phone]    Patient is a 84y old  Male who presents with a chief complaint of CHF exacerbation (25 Apr 2024 02:08)    OVERNIGHT EVENTS: No acute events overnight.    SUBJECTIVE: Pt seen and examined at bedside this morning. Reports that he overall feels better. States he still has some mild episodes of chest tightness but reports it has significantly improved from when he came to the hospital. Denies any dizziness, palpitations or SOB. Reports that he has been urinating frequently since being started on IV lasix.     ADDITIONAL REVIEW OF SYSTEMS: as above.    MEDICATIONS  (STANDING):  aspirin enteric coated 81 milliGRAM(s) Oral daily  atorvastatin 80 milliGRAM(s) Oral at bedtime  carvedilol 3.125 milliGRAM(s) Oral every 12 hours  clopidogrel Tablet 75 milliGRAM(s) Oral daily  furosemide   Injectable 20 milliGRAM(s) IV Push every 12 hours  heparin   Injectable 5000 Unit(s) SubCutaneous every 12 hours  isosorbide   mononitrate ER Tablet (IMDUR) 30 milliGRAM(s) Oral every 24 hours  omega-3-Acid Ethyl Esters 1 Gram(s) Oral two times a day  oxybutynin 5 milliGRAM(s) Oral every 12 hours  pantoprazole   Suspension 40 milliGRAM(s) Oral daily    MEDICATIONS  (PRN):  acetaminophen     Tablet .. 650 milliGRAM(s) Oral every 6 hours PRN Temp greater or equal to 38C (100.4F), Mild Pain (1 - 3)    CAPILLARY BLOOD GLUCOSE    I&O's Summary    PHYSICAL EXAM:  Vital Signs Last 24 Hrs  T(C): 36.3 (25 Apr 2024 03:16), Max: 36.9 (24 Apr 2024 17:44)  T(F): 97.4 (25 Apr 2024 03:16), Max: 98.4 (24 Apr 2024 17:44)  HR: 75 (25 Apr 2024 10:06) (64 - 86)  BP: 149/63 (25 Apr 2024 10:06) (94/52 - 149/63)  BP(mean): 69 (25 Apr 2024 03:58) (63 - 74)  RR: 18 (25 Apr 2024 10:06) (15 - 18)  SpO2: 99% (25 Apr 2024 10:06) (93% - 100%)    Parameters below as of 25 Apr 2024 10:06  Patient On (Oxygen Delivery Method): nasal cannula  O2 Flow (L/min): 2    CONSTITUTIONAL: NAD, well-developed  HEAD: Normocephalic, atraumatic  EYES: EOMI, PERRL  ENT: no rhinorrhea, no pharyngeal erythema  RESPIRATORY: No increased work of breathing, CTAB, no wheezes or crackles appreciated  CARDIOVASCULAR: RRR, S1 and S2 present, no m/r/g  ABDOMEN: soft, NT, ND, bowel sounds present  EXTREMITIES: No LE edema  MUSCULOSKELETAL: no joint swelling, no tenderness to palpation  NEURO: A&Ox3, moving all extremities    LABS:                        10.8   8.02  )-----------( 197      ( 25 Apr 2024 07:42 )             34.7     04-25    138  |  104  |  22  ----------------------------<  106<H>  4.3   |  22  |  1.11    Ca    8.9      25 Apr 2024 07:42  Phos  3.5     04-25  Mg     1.90     04-25    TPro  7.9  /  Alb  3.7  /  TBili  0.5  /  DBili  x   /  AST  19  /  ALT  12  /  AlkPhos  73  04-25    PT/INR - ( 24 Apr 2024 18:30 )   PT: 13.0 sec;   INR: 1.16 ratio       PTT - ( 24 Apr 2024 18:30 )  PTT:32.8 sec  CARDIAC MARKERS ( 25 Apr 2024 00:45 )  x     / x     / 73 U/L / x     / 2.7 ng/mL  CARDIAC MARKERS ( 24 Apr 2024 21:40 )  x     / x     / 69 U/L / x     / 2.6 ng/mL  CARDIAC MARKERS ( 24 Apr 2024 18:30 )  x     / x     / 71 U/L / x     / 2.4 ng/mL    Urinalysis Basic - ( 25 Apr 2024 07:42 )    Color: x / Appearance: x / SG: x / pH: x  Gluc: 106 mg/dL / Ketone: x  / Bili: x / Urobili: x   Blood: x / Protein: x / Nitrite: x   Leuk Esterase: x / RBC: x / WBC x   Sq Epi: x / Non Sq Epi: x / Bacteria: x    RADIOLOGY & ADDITIONAL TESTS:    Results Reviewed:   Imaging Personally Reviewed:  Electrocardiogram Personally Reviewed:    COORDINATION OF CARE:  Care Discussed with Consultants/Other Providers [Y/N]:  Prior or Outpatient Records Reviewed [Y/N]:

## 2024-04-25 NOTE — PATIENT PROFILE ADULT - OVER THE PAST TWO WEEKS, HAVE YOU FELT LITTLE INTEREST OR PLEASURE IN DOING THINGS?
Child Life services introduced to pt and pt's family at bedside. Emotional support provided. Developmentally appropriate preparation provided for x-rays. TV remote introduced for normalization. Call light introduced. No additional child life needs were noted at this time, but will follow to assess and provide services as needed.   no

## 2024-04-25 NOTE — H&P ADULT - ASSESSMENT
84-year-old male with history of HTN, HLD, AAA status post endovascular pair, HFrEF with AICD, CAD with three-vessel CABG, recent cardiac catheterization 5 days ago with multivessel stenosis approximately 60% of max, most recent EF approximately 20 to 25% presented to the emergency department with acute onset crushing substernal chest pain radiating to the left and right chest since 3:30 PM here w/ CHF exacerbation.

## 2024-04-25 NOTE — ED ADULT NURSE REASSESSMENT NOTE - CONDITION
Attempt to give ESSU1 report, RN unable to take report at this time.
Breakcoverage rpt from ZAIDA Dickinson

## 2024-04-25 NOTE — ED ADULT NURSE REASSESSMENT NOTE - NS ED NURSE REASSESS COMMENT FT1
Patient  A&Ox4 , breathing with ease, no signs of acute distress, no complaints at this time, IV patent and intact, will continue to monitor and assess
Patient received laying on stretcher with family at bedside A&Ox4 , breathing with ease, no signs of acute distress, no complaints at this time, IV patent and intact, will continue to monitor and assess

## 2024-04-25 NOTE — PROGRESS NOTE ADULT - PROBLEM SELECTOR PLAN 1
-c/w lasix 20 mg iv BID for now  -strict ins and outs  -tele monitoring  -TTE pending  -1 L restriction  -EKG/troponin if patient develops cp  -troponin peaked, ckmb and ck wnl low concern for ACS at this time -c/w lasix 20 mg iv BID for now  -strict ins and outs  -tele monitoring  -TTE pending  -1 L restriction  -EKG/troponin if patient develops cp  -troponin peaked, ckmb and ck wnl low concern for ACS at this time  -cardiology following, assistance appreciated

## 2024-04-25 NOTE — H&P ADULT - HISTORY OF PRESENT ILLNESS
84-year-old male with history of HTN, HLD, AAA status post endovascular pair, HFrEF with AICD, CAD with three-vessel CABG, recent cardiac catheterization 5 days ago with multivessel stenosis approximately 60% of max, most recent EF approximately 20 to 25% presented to the emergency department with acute onset crushing substernal chest pain radiating to the left and right chest since 3:30 PM. This lasted for 30 minutes, then resolved. Troponin 81--->112--->96, CKMB ck wnl low concern for ACS. EKG paced rhythm no Sgarbossa criteria met. Chest xray w/ pulm edema. Cardiology evaluated patient.

## 2024-04-25 NOTE — H&P ADULT - PROBLEM SELECTOR PLAN 1
-strict ins and outs  -lasix 20 mg iv BID  -tele monitoring  -TTE  -1 L restriction  -EKG/troponin if patient develops cp  -troponin peaked, ckmb and ck wnl low concern for ACS at this time

## 2024-04-25 NOTE — PATIENT PROFILE ADULT - NSPROMEDSBROUGHTTOHOSP_GEN_A_NUR
For the couple weeks has been having some weakness to L arm and leg - better this week per patient   No facial drooping  One arm does not drift downward when lifting arm up  No speech difficulty  No numbness or tingling  Is having some weakness to L knee which she feels is not helping     Dr Forde recommended stopping the Gabapentin because he is not having any pain in his foot any longer and wife wants to make sure you would be okay with him stopping the Gabapentin?     She is asking r/t his BS at what reading would you recommend he go to the ER for hyperglycemia?     Patient has been taking Lantus 10 units in the AM and 25 units in the evening - okay to continue this dose?     Advised on Glyburide and will increase to BID. New script sent to pharmacy for patient          no

## 2024-04-25 NOTE — PATIENT PROFILE ADULT - FALL HARM RISK - HARM RISK INTERVENTIONS

## 2024-04-26 ENCOUNTER — TRANSCRIPTION ENCOUNTER (OUTPATIENT)
Age: 84
End: 2024-04-26

## 2024-04-26 PROBLEM — I25.10 ATHEROSCLEROTIC HEART DISEASE OF NATIVE CORONARY ARTERY WITHOUT ANGINA PECTORIS: Chronic | Status: ACTIVE | Noted: 2024-04-19

## 2024-04-26 LAB
ANION GAP SERPL CALC-SCNC: 14 MMOL/L — SIGNIFICANT CHANGE UP (ref 7–14)
BUN SERPL-MCNC: 25 MG/DL — HIGH (ref 7–23)
CALCIUM SERPL-MCNC: 8.6 MG/DL — SIGNIFICANT CHANGE UP (ref 8.4–10.5)
CHLORIDE SERPL-SCNC: 101 MMOL/L — SIGNIFICANT CHANGE UP (ref 98–107)
CO2 SERPL-SCNC: 20 MMOL/L — LOW (ref 22–31)
CREAT SERPL-MCNC: 1.03 MG/DL — SIGNIFICANT CHANGE UP (ref 0.5–1.3)
EGFR: 72 ML/MIN/1.73M2 — SIGNIFICANT CHANGE UP
GLUCOSE SERPL-MCNC: 97 MG/DL — SIGNIFICANT CHANGE UP (ref 70–99)
HCT VFR BLD CALC: 30.1 % — LOW (ref 39–50)
HGB BLD-MCNC: 9.6 G/DL — LOW (ref 13–17)
MAGNESIUM SERPL-MCNC: 1.6 MG/DL — SIGNIFICANT CHANGE UP (ref 1.6–2.6)
MCHC RBC-ENTMCNC: 27.4 PG — SIGNIFICANT CHANGE UP (ref 27–34)
MCHC RBC-ENTMCNC: 31.9 GM/DL — LOW (ref 32–36)
MCV RBC AUTO: 86 FL — SIGNIFICANT CHANGE UP (ref 80–100)
MRSA PCR RESULT.: SIGNIFICANT CHANGE UP
NRBC # BLD: 0 /100 WBCS — SIGNIFICANT CHANGE UP (ref 0–0)
NRBC # FLD: 0 K/UL — SIGNIFICANT CHANGE UP (ref 0–0)
PHOSPHATE SERPL-MCNC: 3.8 MG/DL — SIGNIFICANT CHANGE UP (ref 2.5–4.5)
PLATELET # BLD AUTO: 166 K/UL — SIGNIFICANT CHANGE UP (ref 150–400)
POTASSIUM SERPL-MCNC: 3.8 MMOL/L — SIGNIFICANT CHANGE UP (ref 3.5–5.3)
POTASSIUM SERPL-SCNC: 3.8 MMOL/L — SIGNIFICANT CHANGE UP (ref 3.5–5.3)
RBC # BLD: 3.5 M/UL — LOW (ref 4.2–5.8)
RBC # FLD: 15.1 % — HIGH (ref 10.3–14.5)
S AUREUS DNA NOSE QL NAA+PROBE: SIGNIFICANT CHANGE UP
SODIUM SERPL-SCNC: 135 MMOL/L — SIGNIFICANT CHANGE UP (ref 135–145)
WBC # BLD: 7.01 K/UL — SIGNIFICANT CHANGE UP (ref 3.8–10.5)
WBC # FLD AUTO: 7.01 K/UL — SIGNIFICANT CHANGE UP (ref 3.8–10.5)

## 2024-04-26 PROCEDURE — 99233 SBSQ HOSP IP/OBS HIGH 50: CPT

## 2024-04-26 PROCEDURE — 99232 SBSQ HOSP IP/OBS MODERATE 35: CPT | Mod: GC

## 2024-04-26 RX ORDER — SACUBITRIL AND VALSARTAN 24; 26 MG/1; MG/1
1 TABLET, FILM COATED ORAL
Refills: 0 | Status: DISCONTINUED | OUTPATIENT
Start: 2024-04-26 | End: 2024-04-27

## 2024-04-26 RX ADMIN — Medication 6 MILLIGRAM(S): at 21:20

## 2024-04-26 RX ADMIN — Medication 20 MILLIGRAM(S): at 21:20

## 2024-04-26 RX ADMIN — Medication 1 GRAM(S): at 05:57

## 2024-04-26 RX ADMIN — CLOPIDOGREL BISULFATE 75 MILLIGRAM(S): 75 TABLET, FILM COATED ORAL at 11:35

## 2024-04-26 RX ADMIN — HEPARIN SODIUM 5000 UNIT(S): 5000 INJECTION INTRAVENOUS; SUBCUTANEOUS at 17:49

## 2024-04-26 RX ADMIN — ATORVASTATIN CALCIUM 80 MILLIGRAM(S): 80 TABLET, FILM COATED ORAL at 21:20

## 2024-04-26 RX ADMIN — Medication 20 MILLIGRAM(S): at 09:16

## 2024-04-26 RX ADMIN — ISOSORBIDE MONONITRATE 30 MILLIGRAM(S): 60 TABLET, EXTENDED RELEASE ORAL at 09:19

## 2024-04-26 RX ADMIN — Medication 5 MILLIGRAM(S): at 05:57

## 2024-04-26 RX ADMIN — Medication 5 MILLIGRAM(S): at 17:48

## 2024-04-26 RX ADMIN — PANTOPRAZOLE SODIUM 40 MILLIGRAM(S): 20 TABLET, DELAYED RELEASE ORAL at 11:36

## 2024-04-26 RX ADMIN — Medication 81 MILLIGRAM(S): at 11:35

## 2024-04-26 RX ADMIN — HEPARIN SODIUM 5000 UNIT(S): 5000 INJECTION INTRAVENOUS; SUBCUTANEOUS at 06:01

## 2024-04-26 RX ADMIN — Medication 1 GRAM(S): at 17:48

## 2024-04-26 NOTE — PROGRESS NOTE ADULT - PROBLEM SELECTOR PLAN 5
Diet: DASH/1 L restriction  DVT: HSQ  Dispo: pending clinical course, likely home    Insomnia: c/w melatonin  GERD: c/w PPI    Case and plan of care discussed with patient and daughter at bedside on 4/26.

## 2024-04-26 NOTE — PHYSICAL THERAPY INITIAL EVALUATION ADULT - MANUAL MUSCLE TESTING RESULTS, REHAB EVAL
Bilateral upper and lower extremity strength 5/5.
Attending Attestation (For Attendings USE Only)...

## 2024-04-26 NOTE — PROGRESS NOTE ADULT - SUBJECTIVE AND OBJECTIVE BOX
Patient seen and examined at bedside.    Overnight Events: No overnight events. Denies chest pain, shortness of breath.     Review Of Systems: No chest pain, shortness of breath, or palpitations            Current Meds:  acetaminophen     Tablet .. 650 milliGRAM(s) Oral every 6 hours PRN  aspirin enteric coated 81 milliGRAM(s) Oral daily  atorvastatin 80 milliGRAM(s) Oral at bedtime  carvedilol 3.125 milliGRAM(s) Oral every 12 hours  clopidogrel Tablet 75 milliGRAM(s) Oral daily  furosemide   Injectable 20 milliGRAM(s) IV Push every 12 hours  heparin   Injectable 5000 Unit(s) SubCutaneous every 12 hours  isosorbide   mononitrate ER Tablet (IMDUR) 30 milliGRAM(s) Oral every 24 hours  melatonin 6 milliGRAM(s) Oral at bedtime  omega-3-Acid Ethyl Esters 1 Gram(s) Oral two times a day  oxybutynin 5 milliGRAM(s) Oral every 12 hours  pantoprazole   Suspension 40 milliGRAM(s) Oral daily      Vitals:  T(F): 97.6 (04-26), Max: 98.3 (04-25)  HR: 78 (04-26) (69 - 88)  BP: 102/60 (04-26) (102/60 - 149/63)  RR: 18 (04-26)  SpO2: 96% (04-26)  I&O's Summary    25 Apr 2024 07:01  -  26 Apr 2024 07:00  --------------------------------------------------------  IN: 400 mL / OUT: 1850 mL / NET: -1450 mL        Physical Exam:  Appearance: No acute distress; well appearing  Eyes: PERRL, EOMI, pink conjunctiva  HEENT: Normal oral mucosa  Cardiovascular: RRR, S1, S2, no murmurs, rubs, or gallops; no edema; no JVD  Respiratory: Clear to auscultation bilaterally  Gastrointestinal: soft, non-tender, non-distended with normal bowel sounds  Extremities: No LE edema, warm extremties, 2+ DP  Musculoskeletal: No clubbing; no joint deformity                             9.6    7.01  )-----------( 166      ( 26 Apr 2024 05:36 )             30.1     04-25    138  |  104  |  22  ----------------------------<  106<H>  4.3   |  22  |  1.11    Ca    8.9      25 Apr 2024 07:42  Phos  3.5     04-25  Mg     1.90     04-25    TPro  7.9  /  Alb  3.7  /  TBili  0.5  /  DBili  x   /  AST  19  /  ALT  12  /  AlkPhos  73  04-25    PT/INR - ( 24 Apr 2024 18:30 )   PT: 13.0 sec;   INR: 1.16 ratio         PTT - ( 24 Apr 2024 18:30 )  PTT:32.8 sec  CARDIAC MARKERS ( 25 Apr 2024 00:45 )  96 ng/L / x     / x     / 73 U/L / x     / 2.7 ng/mL  CARDIAC MARKERS ( 24 Apr 2024 21:40 )  112 ng/L / x     / x     / 69 U/L / x     / 2.6 ng/mL  CARDIAC MARKERS ( 24 Apr 2024 18:30 )  81 ng/L / x     / x     / 71 U/L / x     / 2.4 ng/mL              New ECG(s): Personally reviewed  < from: Cardiac Catheterization (04.19.24 @ 07:56) >  Diagnostic Conclusions:     SVG to Ramus Stent in the mid graft has moderate ISR. 60%. Recommend  medical therapy  Patent SVG to LAD   RCA is , unchanged   Recommendations:     Medical therapy      Diagnostic Findings:     Coronary Angiography   The coronary circulation is co-dominant.           Patient: CATHY MARTINES                  MRN: [id number]  Study Date: 04/19/2024   07:56 AM      Page 1 of 4          Distal left main: unchanged. There is a 30 % stenosis.      LAD   Left anterior descending artery: There is a 100 % stenosis.      CX   Ostial circumflex: unchanged. There is a 40 % stenosis.      RCA   Distal right coronary artery: unchanged. There is a 100 % stenosis.      Graft Angiography   SVG graft to Proximal left anterior descending: Angiography shows mild  atherosclerosis.  SVG graft to Ramus intermedius: Stent in the mid graft has moderate  ISR. 60%. Recommend medical therapy .      < end of copied text >    echo:  < from: TTE Limited W or WO Ultrasound Enhancing Agent (05.22.23 @ 12:34) >  CONCLUSIONS:      1. Technically difficult image quality.   2. Left ventricle suboptimally visualized despite intravenous ultrasonic enhancing agent. Severe left ventricular systolic dysfunction. The septum and the apex are akinetic. Estimated LV ejection fraction approximately 20-25% by visual estimation. No left ventricular thrombus seen.   3. Severely dilated left ventricular cavity size. Abnormal septal motion consistent with right ventricular pacemaker.   4. There is mild (grade 1) left ventricular diastolic dysfunction.   5. The right ventricle is not well visualized. grossly normal right ventricular cavity size.   6. Device lead is visualized in the right heart.   7. Symmetric mitral valve leaflet tethering.   8. Mild to moderate mitral regurgitation.   9. Aortic valve was not well visualized.  10. Trileaflet aortic valve.  11. Mild-to-moderate aortic regurgitation.  12. Compared to the transthoracic echocardiogram performed on 7/15/2016 results are similar on today's study.    < end of copied text >      cxr:    < from: Xray Chest 1 View- PORTABLE-Urgent (04.24.24 @ 19:04) >  INTERPRETATION:  EXAMINATION: XR CHEST URGENT    CLINICAL INDICATION: Chest Pain    TECHNIQUE: Single frontal, portable view of the chest was obtained.    COMPARISON: Chest x-ray 5/28/2023.    FINDINGS:  Left chest wall AICD.  The heart is normal in size. Median sternotomy.  Perihilar patchy opacities and prominent interstitial markings,   suggestive of pulmonary edema.  No pneumothorax or pleural effusion.  No acute osseous abnormalities.    IMPRESSION:  Perihilar opacities and prominent interstitial markings, suggestive of   pulmonary edema.    < end of copied text >    Interpretation of Telemetry:

## 2024-04-26 NOTE — DISCHARGE NOTE PROVIDER - NSDCCPCAREPLAN_GEN_ALL_CORE_FT
PRINCIPAL DISCHARGE DIAGNOSIS  Diagnosis: Acute on chronic systolic congestive heart failure  Assessment and Plan of Treatment: You were treated with IV furosmeide with improvmenet in your SOB. Please continue taking LAsix 40mg daily. Check your weight daily and follow up with Dr. Mai within 1-2 weeks of discharge. Take you Blodd pressure log at home, if SBP is less than 100, hold spironolactone and call Cardiology Dr. Robles      SECONDARY DISCHARGE DIAGNOSES  Diagnosis: CAD (coronary artery disease)  Assessment and Plan of Treatment: continue with spirin and plavix    Diagnosis: Mild HTN  Assessment and Plan of Treatment: continue blood pressure medication     PRINCIPAL DISCHARGE DIAGNOSIS  Diagnosis: Acute on chronic systolic congestive heart failure  Assessment and Plan of Treatment: You were treated with IV furosmeide with improvmenet in your SOB. Please continue taking LAsix 40mg daily. Check your weight daily and follow up with Dr. Mai within 1-2 weeks of discharge. Spironolactone was stopped, follow up with Cardiology Dr. Robles to decide if spironolactone should be resumed      SECONDARY DISCHARGE DIAGNOSES  Diagnosis: CAD (coronary artery disease)  Assessment and Plan of Treatment: continue with spirin and plavix    Diagnosis: Mild HTN  Assessment and Plan of Treatment: continue blood pressure medication

## 2024-04-26 NOTE — PHYSICAL THERAPY INITIAL EVALUATION ADULT - GENERAL OBSERVATIONS, REHAB EVAL
Pt encountered ambulating in the hallway with daughter in NAD, all lines intact, a&ox4, SPO2 96%, and RN aware.

## 2024-04-26 NOTE — PROGRESS NOTE ADULT - PROBLEM SELECTOR PLAN 1
-c/w lasix 20 mg iv BID for now, f/u Cards recs  -strict ins and outs  -tele monitoring  -TTE shows EF 15-20%  -1 L restriction  -troponin peaked, ckmb and ck wnl low concern for ACS at this time  -cardiology following, assistance appreciated  - resume Entresto, Cr improved

## 2024-04-26 NOTE — DISCHARGE NOTE PROVIDER - NSDCMRMEDTOKEN_GEN_ALL_CORE_FT
aspirin 81 mg oral delayed release tablet: 1 tab(s) orally once a day  atorvastatin 80 mg oral tablet: 1 tab(s) orally once a day (at bedtime)  carvedilol 3.125 mg oral tablet: 1 tab(s) orally 2 times a day  clopidogrel 75 mg oral tablet: 1 tab(s) orally once a day  Entresto 24 mg-26 mg oral tablet: 1 tab(s) orally 2 times a day  ezetimibe 10 mg oral tablet: 1 orally once a day  furosemide 20 mg oral tablet: 1 tab(s) orally Monday, Wednesday, and Friday  isosorbide mononitrate 30 mg oral tablet, extended release: 1 orally once a day  omega-3 polyunsaturated fatty acids 500 mg oral capsule: 1 orally once a day  oxyBUTYnin 5 mg oral tablet: 1 orally 2 times a day  pantoprazole 40 mg oral delayed release tablet: 1 tab(s) orally once a day (before a meal)  spironolactone 25 mg oral tablet: 0.5 tab(s) orally once a day   aspirin 81 mg oral delayed release tablet: 1 tab(s) orally once a day  atorvastatin 80 mg oral tablet: 1 tab(s) orally once a day (at bedtime)  carvedilol 3.125 mg oral tablet: 1 tab(s) orally 2 times a day  clopidogrel 75 mg oral tablet: 1 tab(s) orally once a day  Entresto 24 mg-26 mg oral tablet: 1 tab(s) orally 2 times a day  ezetimibe 10 mg oral tablet: 1 orally once a day  furosemide 40 mg oral tablet: 1 tab(s) orally once a day  isosorbide mononitrate 30 mg oral tablet, extended release: 1 orally once a day  omega-3 polyunsaturated fatty acids 500 mg oral capsule: 1 orally once a day  oxyBUTYnin 5 mg oral tablet: 1 orally 2 times a day  pantoprazole 40 mg oral delayed release tablet: 1 tab(s) orally once a day (before a meal)  spironolactone 25 mg oral tablet: 0.5 tab(s) orally once a day   aspirin 81 mg oral delayed release tablet: 1 tab(s) orally once a day  atorvastatin 80 mg oral tablet: 1 tab(s) orally once a day (at bedtime)  carvedilol 3.125 mg oral tablet: 1 tab(s) orally 2 times a day  clopidogrel 75 mg oral tablet: 1 tab(s) orally once a day  Entresto 24 mg-26 mg oral tablet: 1 tab(s) orally 2 times a day  ezetimibe 10 mg oral tablet: 1 orally once a day  furosemide 40 mg oral tablet: 1 tab(s) orally once a day  isosorbide mononitrate 30 mg oral tablet, extended release: 1 orally once a day  omega-3 polyunsaturated fatty acids 500 mg oral capsule: 1 orally once a day  oxyBUTYnin 5 mg oral tablet: 1 orally 2 times a day  pantoprazole 40 mg oral delayed release tablet: 1 tab(s) orally once a day (before a meal)

## 2024-04-26 NOTE — DISCHARGE NOTE PROVIDER - CARE PROVIDER_API CALL
Delmar Mai  Cardiovascular Disease  [address] [place] [address]  Phone: [phone]  Fax: [phone]  Follow Up Time:

## 2024-04-26 NOTE — DISCHARGE NOTE PROVIDER - REASON FOR ADMISSION
History of Present Illness


General


Chief Complaint:  Substance Abuse


Source:  Patient





Present Illness


HPI


Patient is a 43-year-old female presents for increased hand numbness bilaterally

after smoking meth approximately 2 hours prior to arrival.  Denies any chest 

discomfort.  Had some prior history of psychosis and reportedly takes Seroquel. 

Denies any recent trauma.  Denies any extremity weakness.  Had no previous 

history of neurologic condition.


Allergies:  


Coded Allergies:  


     MORPHINE (Verified  Allergy, Unknown, 10/16/20)





COVID-19 Screening


Contact w/high risk pt:  No


Experienced COVID-19 symptoms?:  No


COVID-19 Testing performed PTA:  Yes - 10/2020


COVID-19 Screening:  Negative COVID-19


COVID-19 Testing Source:  rehab





Patient History


Past Medical History:  see triage record


Last Menstrual Period:  december 10 2020


Reviewed Nursing Documentation:  PMH: Agreed; PSxH: Agreed





Nursing Documentation-PMH


Past Medical History:  No Stated History





Review of Systems


All Other Systems:  negative except mentioned in HPI





Physical Exam





Vital Signs








  Date Time  Temp Pulse Resp B/P (MAP) Pulse Ox O2 Delivery O2 Flow Rate FiO2


 


1/5/21 22:36 98.2 140 15 154/89 (110) 100 Room Air  








Sp02 EP Interpretation:  reviewed, normal


General Appearance:  normal inspection, well appearing, no apparent distress, 

alert, GCS 15


Head:  atraumatic


ENT:  normal ENT inspection, hearing grossly normal, normal voice


Neck:  normal inspection, full range of motion, supple, no bony tend


Respiratory:  normal inspection, lungs clear, normal breath sounds, no 

respiratory distress, no retraction, no wheezing


Cardiovascular #1:  regular rate, rhythm, no edema


Gastrointestinal:  normal inspection, normal bowel sounds, non tender, soft, no 

guarding, no hernia


Genitourinary:  no CVA tenderness


Musculoskeletal:  normal inspection, back normal, normal range of motion


Neurologic:  alert, motor strength/tone normal, CNs III-XII nml as tested, 

oriented x3, responsive, speech normal, normal inspection


Psychiatric:  normal inspection, judgement/insight normal, mood/affect normal





Medical Decision Making


Diagnostic Impression:  


   Primary Impression:  


   Substance abuse


   Additional Impression:  


   Anxiety


ER Course


Patient presented for bilateral hand numbness.  Differential diagnosis include 

was not limited to anxiety, substance abuse, dehydration among others.  Because 

of complexity of patient's case laboratory tests and imaging studies were 

ordered.  Patient reports having recent substance abuse.  EKG interpreted by me 

shows sinus tachycardia with a rate of 139 without acute ST changes.  Patient 

was given Ativan with improvement in her symptoms.  CT imaging of the head read 

by radiology showed no evidence of acute intracranial process.  Patient was 

mildly hypokalemic was given oral potassium.  Patient had improvement in her ov

erall discomfort.  She advised to discontinue drug use.  She states she will not

use meth again.  Patient is advised to follow-up with her primary care physician

for recheck and further evaluation.  She advised to return if worse.  This 

medical record is generated with Dragon transcription software. There may be 

some transcription discrepancies related to use of this software





Labs








Test


 1/5/21


23:00 1/5/21


23:40


 


White Blood Count


 9.5 K/UL


(4.8-10.8) 





 


Red Blood Count


 4.84 M/UL


(4.20-5.40) 





 


Hemoglobin


 14.5 G/DL


(12.0-16.0) 





 


Hematocrit


 43.3 %


(37.0-47.0) 





 


Mean Corpuscular Volume 89 FL (80-99)  


 


Mean Corpuscular Hemoglobin


 30.0 PG


(27.0-31.0) 





 


Mean Corpuscular Hemoglobin


Concent 33.5 G/DL


(32.0-36.0) 





 


Red Cell Distribution Width


 12.9 %


(11.6-14.8) 





 


Platelet Count


 309 K/UL


(150-450) 





 


Mean Platelet Volume


 6.2 FL


(6.5-10.1) 





 


Neutrophils (%) (Auto)


 58.6 %


(45.0-75.0) 





 


Lymphocytes (%) (Auto)


 34.2 %


(20.0-45.0) 





 


Monocytes (%) (Auto)


 5.2 %


(1.0-10.0) 





 


Eosinophils (%) (Auto)


 0.3 %


(0.0-3.0) 





 


Basophils (%) (Auto)


 1.8 %


(0.0-2.0) 





 


Sodium Level


 137 MMOL/L


(136-145) 





 


Potassium Level


 3.3 MMOL/L


(3.5-5.1) 





 


Chloride Level


 103 MMOL/L


() 





 


Carbon Dioxide Level


 26 MMOL/L


(21-32) 





 


Anion Gap


 8 mmol/L


(5-15) 





 


Blood Urea Nitrogen


 11 mg/dL


(7-18) 





 


Creatinine


 1.1 MG/DL


(0.55-1.30) 





 


Estimat Glomerular Filtration


Rate > 60 mL/min


(>60) 





 


Glucose Level


 130 MG/DL


() 





 


Calcium Level


 8.8 MG/DL


(8.5-10.1) 





 


Total Bilirubin


 0.5 MG/DL


(0.2-1.0) 





 


Aspartate Amino Transf


(AST/SGOT) 29 U/L (15-37) 


 





 


Alanine Aminotransferase


(ALT/SGPT) 29 U/L (12-78) 


 





 


Alkaline Phosphatase


 83 U/L


() 





 


Troponin I


 0.000 ng/mL


(0.000-0.056) 





 


Total Protein


 8.1 G/DL


(6.4-8.2) 





 


Albumin


 4.1 G/DL


(3.4-5.0) 





 


Globulin 4.0 g/dL  


 


Albumin/Globulin Ratio 1.0 (1.0-2.7)  


 


Lipase


 70 U/L


() 











EKG Diagnostic Results


Rate:  tachycardiac


Rhythm:  NSR


ST Segments:  no acute changes





Last Vital Signs








  Date Time  Temp Pulse Resp B/P (MAP) Pulse Ox O2 Delivery O2 Flow Rate FiO2


 


1/5/21 22:36 98.2 140 15 154/89 (110) 100 Room Air  








Status:  improved











Trav Adams MD                Jan 5, 2021 22:51
CHF exacerbation

## 2024-04-26 NOTE — DISCHARGE NOTE PROVIDER - NSDCHHATTENDCERT_GEN_ALL_CORE
10/09/21 1335   Encounter Summary   Services provided to: Patient and family together   Referral/Consult From: Nurse;Family   Support System Spouse; Children;Family members   Place of [address] [facility] [address] [place]   (Prayer; support/storytelling, prn follow-up.  10/9)   Complexity of Encounter Moderate   Length of Encounter 30 minutes   Spiritual/Baptism   Type Spiritual support   Assessment Calm; Approachable;Tearful;Grieving   Intervention Active listening;Explored feelings, thoughts, concerns;Explored coping resources;Nurtured hope;Prayer;Scripture;Sustaining presence/ Ministry of presence;Grief care; End of life care; Discussed meaning/purpose;Discussed relationship with God;Discussed belief system/Restorationism practices/jasmin;Discussed illness/injury and it's impact   Outcome Connection/belonging;Comfort;Expressed gratitude;Engaged in conversation; Shared life review;Expressed feelings/needs/concerns;Coping;Tearful;Grieving;Encouraged;Receptive      self-initiated this visit w/the pt and pt's family at bedside per spiritual care consult. Pt's daughter and spouse were at bedside. Pt unable to engage at this time due to medical status. Pt's family engaged easily w/ in processing hospitalization, storytelling, and life review. Pt's family was tearful and coping appropriately at this time. Pt's family requested prayer for pt's comfort and peace.  prayed in accordance w/family's request.  No other immediate needs/concerns were expressed by the family at this time. Family was appreciative of on-going support. Throughout the course of this visit  offered empathetic listening, prayer, and encouragement. Should any needs arise, please contact spiritual care services for follow-up.      Electronically signed by Deidre Haque on 10/9/2021 at 1:39 PM My signature below certifies that the above stated patient is homebound and upon completion of the Face-To-Face encounter, has the need for intermittent skilled nursing, physical therapy and/or speech or occupational therapy services in their home for their current diagnosis as outlined in their initial plan of care. These services will continue to be monitored by myself or another physician.

## 2024-04-26 NOTE — PHYSICAL THERAPY INITIAL EVALUATION ADULT - ADDITIONAL COMMENTS
Pt left semisupine in bed in NAD, all lines intact, call bell in reach, SPO2 96%, daughter at bedside, and RN aware.

## 2024-04-26 NOTE — PHYSICAL THERAPY INITIAL EVALUATION ADULT - PERTINENT HX OF CURRENT PROBLEM, REHAB EVAL
Patient is a 84 year old male, PMH stated below, presents with acute onset pressure like chest pain, non-radiating with SOB; CHF exacerbation.

## 2024-04-26 NOTE — PHYSICAL THERAPY INITIAL EVALUATION ADULT - ACTIVE RANGE OF MOTION EXAMINATION, REHAB EVAL
kayleen. upper extremity Active ROM was WNL (within normal limits)/bilateral lower extremity Active ROM was WNL (within normal limits)

## 2024-04-26 NOTE — DISCHARGE NOTE PROVIDER - HOSPITAL COURSE
84-year-old male with history of HTN, HLD, AAA status post endovascular pair, HFrEF with AICD, CAD with three-vessel CABG, recent cardiac catheterization 5 days ago with multivessel stenosis approximately 60% of max, most recent EF approximately 20 to 25% presented to the emergency department with acute onset crushing substernal chest pain radiating to the left and right chest now admitted w/ CHF exacerbation.        Acute on chronic systolic congestive heart failure.   s/p IV diuretics, improved symptoms  continue GDMT  Pt and family advised to trend BP at home, if SBP is less than 100, hold spironolactone and call Cardiology Dr. Robles  Cr improved    CAD (coronary artery disease).   s/p recent CATH  c/w aspirin 81 mg, plavix 75 mg, and omega 3s  -c/w lipitor 80 mg.     GERD (gastroesophageal reflux disease).   c/w protonix.    Essential  HTN.   c/w BP meds  Pt and family advised to trend BP at home, if SBP is less than 100, hold spironolactone and call Cardiology Dr. Robles     84-year-old male with history of HTN, HLD, AAA status post endovascular pair, HFrEF with AICD, CAD with three-vessel CABG, recent cardiac catheterization 5 days ago with multivessel stenosis approximately 60% of max, most recent EF approximately 20 to 25% presented to the emergency department with acute onset crushing substernal chest pain radiating to the left and right chest now admitted w/ CHF exacerbation.        Acute on chronic systolic congestive heart failure.   s/p IV diuretics, improved symptoms  continue GDMT  Pt and family advised to trend BP at home, if SBP is less than 100, hold spironolactone and call Cardiology Dr. Robles  Cr improved    CAD (coronary artery disease).   s/p recent CATH  c/w aspirin 81 mg, plavix 75 mg, and omega 3s  -c/w lipitor 80 mg.     GERD (gastroesophageal reflux disease).   c/w protonix.    Essential  HTN.   c/w BP meds  Spironolactone was stopped due to BP 97/52.     84-year-old male with history of HTN, HLD, AAA status post endovascular pair, HFrEF with AICD, CAD with three-vessel CABG, recent cardiac catheterization 5 days ago with multivessel stenosis approximately 60% of max, most recent EF approximately 20 to 25% presented to the emergency department with acute onset crushing substernal chest pain radiating to the left and right chest now admitted w/ CHF exacerbation.       Acute on chronic systolic congestive heart failure.   s/p IV diuretics, improved symptoms  continue GDMT  Pt and family advised to trend BP at home, if SBP is less than 100, hold spironolactone and call Cardiology Dr. Robles  Cr improved    CAD (coronary artery disease).   s/p recent CATH  c/w aspirin 81 mg, plavix 75 mg, and omega 3s  -c/w lipitor 80 mg.     GERD (gastroesophageal reflux disease).   c/w protonix.    Essential  HTN.   c/w BP meds  Spironolactone was stopped due to BP 97/52.

## 2024-04-26 NOTE — PROGRESS NOTE ADULT - ATTENDING COMMENTS
The patient was seen and examined with the Cardiology Consultation Teaching Service.   Daughter at the bedside, and granddaughter on the phone    No overnight events    No chest pain  No dyspnea, orthopnea or PND  No palpitations or dizziness     Breathing is significantly improved  Has not really ambulated significantly    PMH/PSH:  Coronary artery disease    Coronary artery bypass (SVG-LAD, SVG-Ramus, SVG-PDA)    Percutanteous coronary intervention to SVG-Ramus  Stage C heart failure with reduced LVEF 20%    LV aneurysmectomy    Dual chamber pacemaker with ICD lead  Hypertension  Dyslipidemia  Abdominal aortic aneurysm with endovascular repair    Comfortable-appearing man in no acute distress  Alert and oriented  Afebrile  Vital signs stable  BP is low normal  I/O net negative 1.4L  JVP is not elevated  Clear lungs  Normal heart sounds  Extremities are warm and perfused  No peripheral edema     Normocytic anemia Hb 9.6  GFR 72    Hs-troponin 96, 112, 81  Pro-BNP 5235    ECG demonstrates sinus rhythm with V-pacing, occasional native beat  CXR demonstrates pulmonary congestion    Echocardiography demonstrates severe LV systolic function, LVEF 20-25%, LVEDD 8.1, mild-moderate MR, mild-moderate AI    Impression and Recommendations:   84-year-old man with coronary artery disease, prior CABG and PCI, chronic heart failure with reduced LV function, and AAA with repair, who presents with worsening dyspnea and evidence of pulmonary congestion concerning for acute decompensated heart failure.     It is unclear if there was an acute precipitant to this patient's presentation after being well several days ago. His symptoms and clinical history are more concerning for acute decompensated heart failure than acute coronary syndrome and I would treat him as such.     Continue intravenous diuretics for a goal net negative 1-2L daily.   Potential transition to PO tomorrow. I would discharge on at least 40mg PO daily.   Continue carvedilol and spironolactone.   Continue washout of ramipril.  Can start losartan to assist in possible transition to ARNI, though the patient's low normal blood pressure may be an limiting factor.     Please investigate whether the patient will have access to SGLT2-inhibitors, which we would recommend starting prior to discharge.    Continue aspirin, clopidorel, atorvastatin and isosorbide for his known coronary disease and prior PCI. No plans for repeat coronary angiography or PCI at this time.    Pillo Hackett MD [facility] FACP  Cardiology  [phone]

## 2024-04-26 NOTE — DISCHARGE NOTE PROVIDER - NSDCFUSCHEDAPPT_GEN_ALL_CORE_FT
[facility]  [phone]  Scheduled Appointment: 05/30/2024

## 2024-04-26 NOTE — PROGRESS NOTE ADULT - SUBJECTIVE AND OBJECTIVE BOX
[facility] Division of Hospital Medicine  Terry Hauser MD  Pager [phone]      Patient is a 84y old  Male who presents with a chief complaint of CHF exacerbation (26 Apr 2024 07:25)      SUBJECTIVE / OVERNIGHT EVENTS: Improved SOB. endorsing poor sleep at night and acid reflux. No CP or SOB    MEDICATIONS  (STANDING):  aspirin enteric coated 81 milliGRAM(s) Oral daily  atorvastatin 80 milliGRAM(s) Oral at bedtime  carvedilol 3.125 milliGRAM(s) Oral every 12 hours  clopidogrel Tablet 75 milliGRAM(s) Oral daily  furosemide   Injectable 20 milliGRAM(s) IV Push every 12 hours  heparin   Injectable 5000 Unit(s) SubCutaneous every 12 hours  isosorbide   mononitrate ER Tablet (IMDUR) 30 milliGRAM(s) Oral every 24 hours  melatonin 6 milliGRAM(s) Oral at bedtime  omega-3-Acid Ethyl Esters 1 Gram(s) Oral two times a day  oxybutynin 5 milliGRAM(s) Oral every 12 hours  pantoprazole   Suspension 40 milliGRAM(s) Oral daily    MEDICATIONS  (PRN):  acetaminophen     Tablet .. 650 milliGRAM(s) Oral every 6 hours PRN Temp greater or equal to 38C (100.4F), Mild Pain (1 - 3)      CAPILLARY BLOOD GLUCOSE        I&O's Summary    25 Apr 2024 07:01  -  26 Apr 2024 07:00  --------------------------------------------------------  IN: 400 mL / OUT: 1850 mL / NET: -1450 mL        PHYSICAL EXAM:  Vital Signs Last 24 Hrs  T(C): 36.7 (26 Apr 2024 09:20), Max: 36.8 (25 Apr 2024 15:59)  T(F): 98.1 (26 Apr 2024 09:20), Max: 98.3 (25 Apr 2024 15:59)  HR: 75 (26 Apr 2024 09:20) (69 - 81)  BP: 110/72 (26 Apr 2024 09:20) (102/60 - 121/70)  BP(mean): --  RR: 18 (26 Apr 2024 09:20) (16 - 18)  SpO2: 96% (26 Apr 2024 09:20) (96% - 100%)    Parameters below as of 26 Apr 2024 09:20  Patient On (Oxygen Delivery Method): room air      CONSTITUTIONAL: NAD  EYES: conjunctiva and sclera clear  ENMT: mmm  NECK: Supple,  RESPIRATORY: Normal respiratory effort; lungs are clear to auscultation bilaterally  CARDIOVASCULAR: Regular rate and rhythm, + S1 and S2, faint crackles.   ABDOMEN: Nontender to palpation, normoactive bowel sounds, no rebound/guarding  PSYCH: A+O x 3    LABS:                        9.6    7.01  )-----------( 166      ( 26 Apr 2024 05:36 )             30.1     04-26    135  |  101  |  25<H>  ----------------------------<  97  3.8   |  20<L>  |  1.03    Ca    8.6      26 Apr 2024 05:36  Phos  3.8     04-26  Mg     1.60     04-26    TPro  7.9  /  Alb  3.7  /  TBili  0.5  /  DBili  x   /  AST  19  /  ALT  12  /  AlkPhos  73  04-25    PT/INR - ( 24 Apr 2024 18:30 )   PT: 13.0 sec;   INR: 1.16 ratio         PTT - ( 24 Apr 2024 18:30 )  PTT:32.8 sec  CARDIAC MARKERS ( 25 Apr 2024 00:45 )  x     / x     / 73 U/L / x     / 2.7 ng/mL  CARDIAC MARKERS ( 24 Apr 2024 21:40 )  x     / x     / 69 U/L / x     / 2.6 ng/mL  CARDIAC MARKERS ( 24 Apr 2024 18:30 )  x     / x     / 71 U/L / x     / 2.4 ng/mL      Urinalysis Basic - ( 26 Apr 2024 05:36 )    Color: x / Appearance: x / SG: x / pH: x  Gluc: 97 mg/dL / Ketone: x  / Bili: x / Urobili: x   Blood: x / Protein: x / Nitrite: x   Leuk Esterase: x / RBC: x / WBC x   Sq Epi: x / Non Sq Epi: x / Bacteria: x

## 2024-04-26 NOTE — PHYSICAL THERAPY INITIAL EVALUATION ADULT - NSPTDISCHREC_GEN_A_CORE
Pt is functionally independent and not in need of PT.  Will no longer continue to follow./No skilled PT needs

## 2024-04-27 ENCOUNTER — TRANSCRIPTION ENCOUNTER (OUTPATIENT)
Age: 84
End: 2024-04-27

## 2024-04-27 VITALS
SYSTOLIC BLOOD PRESSURE: 104 MMHG | RESPIRATION RATE: 17 BRPM | HEART RATE: 88 BPM | OXYGEN SATURATION: 100 % | TEMPERATURE: 98 F | DIASTOLIC BLOOD PRESSURE: 60 MMHG

## 2024-04-27 LAB
ANION GAP SERPL CALC-SCNC: 15 MMOL/L — HIGH (ref 7–14)
BUN SERPL-MCNC: 24 MG/DL — HIGH (ref 7–23)
CALCIUM SERPL-MCNC: 8.6 MG/DL — SIGNIFICANT CHANGE UP (ref 8.4–10.5)
CHLORIDE SERPL-SCNC: 98 MMOL/L — SIGNIFICANT CHANGE UP (ref 98–107)
CO2 SERPL-SCNC: 22 MMOL/L — SIGNIFICANT CHANGE UP (ref 22–31)
CREAT SERPL-MCNC: 1.13 MG/DL — SIGNIFICANT CHANGE UP (ref 0.5–1.3)
EGFR: 64 ML/MIN/1.73M2 — SIGNIFICANT CHANGE UP
GLUCOSE SERPL-MCNC: 80 MG/DL — SIGNIFICANT CHANGE UP (ref 70–99)
HCT VFR BLD CALC: 30.2 % — LOW (ref 39–50)
HGB BLD-MCNC: 9.8 G/DL — LOW (ref 13–17)
MAGNESIUM SERPL-MCNC: 1.6 MG/DL — SIGNIFICANT CHANGE UP (ref 1.6–2.6)
MCHC RBC-ENTMCNC: 27.5 PG — SIGNIFICANT CHANGE UP (ref 27–34)
MCHC RBC-ENTMCNC: 32.5 GM/DL — SIGNIFICANT CHANGE UP (ref 32–36)
MCV RBC AUTO: 84.6 FL — SIGNIFICANT CHANGE UP (ref 80–100)
NRBC # BLD: 0 /100 WBCS — SIGNIFICANT CHANGE UP (ref 0–0)
NRBC # FLD: 0 K/UL — SIGNIFICANT CHANGE UP (ref 0–0)
PHOSPHATE SERPL-MCNC: 4.2 MG/DL — SIGNIFICANT CHANGE UP (ref 2.5–4.5)
PLATELET # BLD AUTO: 182 K/UL — SIGNIFICANT CHANGE UP (ref 150–400)
POTASSIUM SERPL-MCNC: 4.3 MMOL/L — SIGNIFICANT CHANGE UP (ref 3.5–5.3)
POTASSIUM SERPL-SCNC: 4.3 MMOL/L — SIGNIFICANT CHANGE UP (ref 3.5–5.3)
RBC # BLD: 3.57 M/UL — LOW (ref 4.2–5.8)
RBC # FLD: 15.1 % — HIGH (ref 10.3–14.5)
SODIUM SERPL-SCNC: 135 MMOL/L — SIGNIFICANT CHANGE UP (ref 135–145)
WBC # BLD: 6.3 K/UL — SIGNIFICANT CHANGE UP (ref 3.8–10.5)
WBC # FLD AUTO: 6.3 K/UL — SIGNIFICANT CHANGE UP (ref 3.8–10.5)

## 2024-04-27 PROCEDURE — 99232 SBSQ HOSP IP/OBS MODERATE 35: CPT

## 2024-04-27 PROCEDURE — 99239 HOSP IP/OBS DSCHRG MGMT >30: CPT

## 2024-04-27 RX ORDER — SPIRONOLACTONE 25 MG/1
0.5 TABLET, FILM COATED ORAL
Refills: 0 | DISCHARGE

## 2024-04-27 RX ORDER — FUROSEMIDE 40 MG
1 TABLET ORAL
Qty: 30 | Refills: 0
Start: 2024-04-27 | End: 2024-05-26

## 2024-04-27 RX ORDER — FUROSEMIDE 40 MG
40 TABLET ORAL DAILY
Refills: 0 | Status: DISCONTINUED | OUTPATIENT
Start: 2024-04-27 | End: 2024-04-27

## 2024-04-27 RX ADMIN — Medication 5 MILLIGRAM(S): at 17:42

## 2024-04-27 RX ADMIN — HEPARIN SODIUM 5000 UNIT(S): 5000 INJECTION INTRAVENOUS; SUBCUTANEOUS at 17:43

## 2024-04-27 RX ADMIN — HEPARIN SODIUM 5000 UNIT(S): 5000 INJECTION INTRAVENOUS; SUBCUTANEOUS at 05:51

## 2024-04-27 RX ADMIN — Medication 1 GRAM(S): at 05:52

## 2024-04-27 RX ADMIN — SACUBITRIL AND VALSARTAN 1 TABLET(S): 24; 26 TABLET, FILM COATED ORAL at 17:43

## 2024-04-27 RX ADMIN — CLOPIDOGREL BISULFATE 75 MILLIGRAM(S): 75 TABLET, FILM COATED ORAL at 11:11

## 2024-04-27 RX ADMIN — ISOSORBIDE MONONITRATE 30 MILLIGRAM(S): 60 TABLET, EXTENDED RELEASE ORAL at 09:14

## 2024-04-27 RX ADMIN — Medication 1 GRAM(S): at 17:42

## 2024-04-27 RX ADMIN — Medication 5 MILLIGRAM(S): at 05:51

## 2024-04-27 RX ADMIN — Medication 81 MILLIGRAM(S): at 11:11

## 2024-04-27 RX ADMIN — CARVEDILOL PHOSPHATE 3.12 MILLIGRAM(S): 80 CAPSULE, EXTENDED RELEASE ORAL at 05:51

## 2024-04-27 RX ADMIN — Medication 20 MILLIGRAM(S): at 09:14

## 2024-04-27 RX ADMIN — PANTOPRAZOLE SODIUM 40 MILLIGRAM(S): 20 TABLET, DELAYED RELEASE ORAL at 11:11

## 2024-04-27 RX ADMIN — SACUBITRIL AND VALSARTAN 1 TABLET(S): 24; 26 TABLET, FILM COATED ORAL at 05:52

## 2024-04-27 NOTE — PROGRESS NOTE ADULT - PROBLEM SELECTOR PLAN 4
-hold entresto while diuresing  -can c/w coreg, isosorbide mononitrate.   -hold spironolactone for now
- c/w BP meds  - trend BP, pt not on rmaipirl at home  - add aldactone, BP remains stable on ENTRESTO
- c/w BP meds  - trend BP  - will add aldactone if BP remains stable on ENTRESTO

## 2024-04-27 NOTE — PROVIDER CONTACT NOTE (OTHER) - ASSESSMENT
Patient asymptomatic and stable
pt sitting comfortably, no s/s distress noted. pt denies CP, SOB, dizziness. had 9 PVCs
BP 97/52, all other vitals stable. Pt asymptomatic,. denies any pain, dizziness or distress.

## 2024-04-27 NOTE — DISCHARGE NOTE NURSING/CASE MANAGEMENT/SOCIAL WORK - PATIENT PORTAL LINK FT
You can access the FollowMyHealth Patient Portal offered by [facility] by registering at the following website: [email or web address]. By joining [facility]’s FollowMyHealth portal, you will also be able to view your health information using other applications (apps) compatible with our system.

## 2024-04-27 NOTE — PROGRESS NOTE ADULT - PROBLEM SELECTOR PLAN 2
-c/w aspirin 81 mg, plavix 75 mg, and omega 3s  -c/w lipitor 80 mg

## 2024-04-27 NOTE — PROGRESS NOTE ADULT - PROBLEM SELECTOR PLAN 1
s/p IV diuretics, improved symptoms  continue GDMT  Pt and family advised to trend BP at home, if SBP is less than 100, hold spironolactone and call Cardiology Dr. Travis oliva

## 2024-04-27 NOTE — PROGRESS NOTE ADULT - PROBLEM SELECTOR PLAN 5
[facility] ORTHOPEDICS 7K  Occupational Therapy  Daily Note  Time:   Time In: 1501  Time Out: 1530  Timed Code Treatment Minutes: 29 Minutes  Minutes: 29          Date: 2024  Patient Name: Magan Oleary,   Gender: female      Room: [facility]05/005-A  MRN: [id number]  : 1950  (73 y.o.)  Referring Practitioner: Guerline Blue, SARITHA - CNP  Diagnosis: Multiple rib fractures involving four more ribs  Additional Pertinent Hx: Ms Oleary is a 74 yo M who presents as a level III trauma consult following a ground level fall. She reports she was walking out of the [place] when she tripped and fell onto her oxygen concentrator. She states she was not able to get up on her due to pain and needed to have assistance. She did not hit her head, did not have any LOC. She reports pain is not well controlled at this time (10/10) and is requesting additional pain medication. She denies chest pain, shortness of breath, nausea/vomiting, or difficulty with urination.    Restrictions/Precautions:  Restrictions/Precautions: Fall Risk     Social/Functional History:  Lives With: Alone  Type of Home: House  Home Layout: One level  Home Access: Level entry  Home Equipment: Oxygen           ADL Assistance: Independent  Homemaking Assistance: Independent  Homemaking Responsibilities: Yes  Ambulation Assistance: Independent  Transfer Assistance: Independent    Active : No  Patient's  Info: Daughter     Additional Comments: Pt ambulates without AD, 4 L at baseline, has goldendoodle dog at home    SUBJECTIVE: RN okayed OT session.  Pt. Resting upon entry.  Pt. Demo anxiety becomes easily frantic pulling on her hair throwing arms around and yelling.  Positive support given Pt. Anxiety reduces and Pt. Agreeable to participate.     PAIN: rib pain did not rate RN notified for pain medication.     Vitals: Oxygen: Pt. On 5L NC with O2 sats at 86% upon entry RN notified and aware, O2 increased per RN.  Pt.  Diet: DASH/1 L restriction  DVT: HSQ  Dispo: pending clinical course, likely home      Case and plan of care discussed with patient, granddaughter and daughter at bedside on 4/27.

## 2024-04-27 NOTE — PROGRESS NOTE ADULT - SUBJECTIVE AND OBJECTIVE BOX
Patient seen and examined at bedside.    Overnight Events: No acute events.       Current Meds:  acetaminophen     Tablet .. 650 milliGRAM(s) Oral every 6 hours PRN  aspirin enteric coated 81 milliGRAM(s) Oral daily  atorvastatin 80 milliGRAM(s) Oral at bedtime  carvedilol 3.125 milliGRAM(s) Oral every 12 hours  clopidogrel Tablet 75 milliGRAM(s) Oral daily  furosemide   Injectable 20 milliGRAM(s) IV Push every 12 hours  heparin   Injectable 5000 Unit(s) SubCutaneous every 12 hours  isosorbide   mononitrate ER Tablet (IMDUR) 30 milliGRAM(s) Oral every 24 hours  melatonin 6 milliGRAM(s) Oral at bedtime  omega-3-Acid Ethyl Esters 1 Gram(s) Oral two times a day  oxybutynin 5 milliGRAM(s) Oral every 12 hours  pantoprazole   Suspension 40 milliGRAM(s) Oral daily  sacubitril 24 mG/valsartan 26 mG 1 Tablet(s) Oral two times a day      Vitals:  T(F): 97.6 (04-27), Max: 98.3 (04-26)  HR: 67 (04-27) (67 - 80)  BP: 105/58 (04-27) (100/52 - 113/68)  RR: 18 (04-27)  SpO2: 98% (04-27)  I&O's Summary    26 Apr 2024 07:01  -  27 Apr 2024 07:00  --------------------------------------------------------  IN: 0 mL / OUT: 100 mL / NET: -100 mL        Physical Exam:  Appearance: No acute distress; well appearing  Eyes: PERRL, EOMI, pink conjunctiva  HEENT: Normal oral mucosa  Cardiovascular: RRR, S1, S2, no murmurs, rubs, or gallops; no edema;  Respiratory: Clear to auscultation bilaterally  Gastrointestinal: soft, non-tender, non-distended with normal bowel sounds  Musculoskeletal: No clubbing; no joint deformity   Neurologic: Non-focal  Skin: No rashes, ecchymoses, or cyanosis                          9.8    6.30  )-----------( 182      ( 27 Apr 2024 04:00 )             30.2     04-27    135  |  98  |  24<H>  ----------------------------<  80  4.3   |  22  |  1.13    Ca    8.6      27 Apr 2024 04:00  Phos  4.2     04-27  Mg     1.60     04-27        CARDIAC MARKERS ( 25 Apr 2024 00:45 )  96 ng/L / x     / x     / 73 U/L / x     / 2.7 ng/mL  CARDIAC MARKERS ( 24 Apr 2024 21:40 )  112 ng/L / x     / x     / 69 U/L / x     / 2.6 ng/mL  CARDIAC MARKERS ( 24 Apr 2024 18:30 )  81 ng/L / x     / x     / 71 U/L / x     / 2.4 ng/mL

## 2024-04-27 NOTE — PROGRESS NOTE ADULT - SUBJECTIVE AND OBJECTIVE BOX
[facility] Division of Hospital Medicine  Terry Hauser MD  Pager [phone]      Patient is a 84y old  Male who presents with a chief complaint of CHF exacerbation (27 Apr 2024 09:31)      SUBJECTIVE / OVERNIGHT EVENTS: Improved SOB or CP    MEDICATIONS  (STANDING):  aspirin enteric coated 81 milliGRAM(s) Oral daily  atorvastatin 80 milliGRAM(s) Oral at bedtime  carvedilol 3.125 milliGRAM(s) Oral every 12 hours  clopidogrel Tablet 75 milliGRAM(s) Oral daily  furosemide    Tablet 40 milliGRAM(s) Oral daily  heparin   Injectable 5000 Unit(s) SubCutaneous every 12 hours  isosorbide   mononitrate ER Tablet (IMDUR) 30 milliGRAM(s) Oral every 24 hours  melatonin 6 milliGRAM(s) Oral at bedtime  omega-3-Acid Ethyl Esters 1 Gram(s) Oral two times a day  oxybutynin 5 milliGRAM(s) Oral every 12 hours  pantoprazole   Suspension 40 milliGRAM(s) Oral daily  sacubitril 24 mG/valsartan 26 mG 1 Tablet(s) Oral two times a day    MEDICATIONS  (PRN):  acetaminophen     Tablet .. 650 milliGRAM(s) Oral every 6 hours PRN Temp greater or equal to 38C (100.4F), Mild Pain (1 - 3)      CAPILLARY BLOOD GLUCOSE        I&O's Summary    26 Apr 2024 07:01  -  27 Apr 2024 07:00  --------------------------------------------------------  IN: 0 mL / OUT: 100 mL / NET: -100 mL    27 Apr 2024 07:01  -  27 Apr 2024 12:09  --------------------------------------------------------  IN: 0 mL / OUT: 200 mL / NET: -200 mL        PHYSICAL EXAM:  Vital Signs Last 24 Hrs  T(C): 36.4 (27 Apr 2024 09:10), Max: 36.8 (26 Apr 2024 13:20)  T(F): 97.6 (27 Apr 2024 09:10), Max: 98.3 (26 Apr 2024 13:20)  HR: 67 (27 Apr 2024 09:10) (67 - 80)  BP: 105/58 (27 Apr 2024 09:10) (100/52 - 113/68)  BP(mean): --  RR: 18 (27 Apr 2024 09:10) (16 - 18)  SpO2: 98% (27 Apr 2024 09:10) (95% - 100%)    Parameters below as of 27 Apr 2024 09:10  Patient On (Oxygen Delivery Method): room air      CONSTITUTIONAL: NAD  EYES: conjunctiva and sclera clear  ENMT: mmm  NECK: Supple,  RESPIRATORY: Normal respiratory effort; lungs are clear to auscultation bilaterally  CARDIOVASCULAR: Regular rate and rhythm, + S1 and S2  ABDOMEN: Nontender to palpation, normoactive bowel sounds, no rebound/guarding  PSYCH: A+O x 3    LABS:                        9.8    6.30  )-----------( 182      ( 27 Apr 2024 04:00 )             30.2     04-27    135  |  98  |  24<H>  ----------------------------<  80  4.3   |  22  |  1.13    Ca    8.6      27 Apr 2024 04:00  Phos  4.2     04-27  Mg     1.60     04-27            Urinalysis Basic - ( 27 Apr 2024 04:00 )    Color: x / Appearance: x / SG: x / pH: x  Gluc: 80 mg/dL / Ketone: x  / Bili: x / Urobili: x   Blood: x / Protein: x / Nitrite: x   Leuk Esterase: x / RBC: x / WBC x   Sq Epi: x / Non Sq Epi: x / Bacteria: x

## 2024-04-27 NOTE — PROVIDER CONTACT NOTE (OTHER) - ACTION/TREATMENT ORDERED:
ACP Leighann Maynard aware. WIll continue to monitor BP. Cardiology adjusting medications.
acp made aware
continue to monitor, if continues will increase coreg

## 2024-04-27 NOTE — DISCHARGE NOTE NURSING/CASE MANAGEMENT/SOCIAL WORK - NSDCPEFALRISK_GEN_ALL_CORE
For information on Fall & Injury Prevention, visit: [email or web address] OR  [email or web address] OR  https://www.cdc.gov/steadi/patient.html

## 2024-04-27 NOTE — PROVIDER CONTACT NOTE (OTHER) - BACKGROUND
Advised pt, writer awaits response from   
LEft message on pt phone.    Advised pt, to call the Pt contact Center for a coding review.    
Patient states staff was working on a billing issue for her. Would like to speak to them regarding that.  
Writer calls ACL- labs done 2/3 are being billed through [employer] , not a[facility].  Will attempt to contact mima  with updated information.  
Patient admitted for chest pain
pt admitted with crushing substernal chest pain radiating to left and right chest. hx of AAA, recent cardiac cath 5 days ago.
Patient admitted for chest pain

## 2024-04-27 NOTE — PROGRESS NOTE ADULT - ASSESSMENT
84 year old primary [facility] speaking male, (ALONDRA Bob [id number]) pmhx HTN, hyperlipidemia, AAA endovascular repair, chronic systolic CHF with ICD, known CAD with 3V CABG with LV endoaneurysmorraphy in [place] 2002 (SVG to LAD, SVG to Ramus, SVG to PDA) with subsequent PTCA/resolute stent SVG to Ramus 4/2016 who was doing well until November 2023 when he was vacationing in [facility] and developed acute SOB, presented to nearest Hospital requiring intubation and advised he had a NSTEMI.  Patient was treated medically, extubated and discharged.  Patient returned to [place], has been doing well.  Had cardiac cath with Dr. FREEMAN Almanza on 4/22/24, medical management was recommended.  On 4/25 presented to the emergency department with acute onset pressure like chest pain non radiating since 3:30 PM.  Pain started when laying in bed not relieved with rest.  Pain was 8/10 now relieved 1 out of 10.  Received aspirin and 1SL nitro with EMS.  Also associated with shortness of breath.  Reports decrease in urine output today.  Denies fever/chills, diaphoresis, abdominal pain, N/V/D.     #BEE  #HF  EF 15-20% in the setting of ICM  -Transition to lasix 40mg PO daily  -Continue Coreg 3.125mg BID  -Continue Imdur 30mg daily  -Continue Entresto 24-26mg BID  -Resume spironolactone 25mg daily    # Elevated Troponin  - Cath with SVG to Ramus Stent in the mid graft has moderate ISR. 60%. Patent SVG to LAD. RCA is   - EKG show showed NSR @73BPM, 1st degree AV block, LVH, repeat V paced @81BPM does not meet sgarbossa criteria  - c/w Dapt (asa, plavix)  - c/w GDMT as tolerated    Follow up with Cardiology 1-2 weeks from discharge.    Shailesh Sutton MD  Cardiology Fellow - PGY 6  For all New Consults and Questions:  [email or web address]   Login: [email or web address]
84 year old primary [facility] speaking male, (ALONDRA Bob [id number]) pmhx HTN, hyperlipidemia, AAA endovascular repair, chronic systolic CHF with ICD, known CAD with 3V CABG with LV endoaneurysmorraphy in [place] 2002 (SVG to LAD, SVG to Ramus, SVG to PDA) with subsequent PTCA/resolute stent SVG to Ramus 4/2016 who was doing well until November 2023 when he was vacationing in [facility] and developed acute SOB, presented to nearest Hospital requiring intubation and advised he had a NSTEMI.  Patient was treated medically, extubated and discharged.  [place] returned to [place], has been doing well.  Had cardiac cath with Dr. FREEMAN Almanza on 4/22/24, medical management was recommended.  Today presented to the emergency department with acute onset pressure like chest pain non radiating since 3:30 PM.  Pain started when laying in bed not relieved with rest.  Pain was 8/10 now relieved 1 out of 10.  Received aspirin and 1SL nitro with EMS.  Also associated with shortness of breath.  Reports decrease in urine output today.  Denies fever/chills, diaphoresis, abdominal pain, N/V/D.     #BEE  #HF  EF 15-20% in the setting of ICM  -Diurese with lasix 40mg IV BID, should be discharge on lasix 40mg PO daily  -Continue coreg 3.125mg BID  -Continue IMDUR 30mg daily  -Restart spironolactone 25mg daily  -Ramipril being held, washout period of 36 hours before starting entresto if covered by insurance, can re-introduce losartan 25mg daily if coverage is not an issue and if BP tolerates    # Elevated Troponin  - Cath with SVG to Ramus Stent in the mid graft has moderate ISR. 60%. Patent SVG to LAD. RCA is   - EKG show showed NSR @73BPM, 1st degree AV block, LVH, repeat V paced @81BPM does not meet sgarbossa criteria  - Serial EKG PRN to assess for ST changes  - Continuous cardiac monitoring to monitor for arrhythmias  - c/w Dapt (asa, plavix)  - c/w GDMT as tolerated  
84-year-old male with history of HTN, HLD, AAA status post endovascular pair, HFrEF with AICD, CAD with three-vessel CABG, recent cardiac catheterization 5 days ago with multivessel stenosis approximately 60% of max, most recent EF approximately 20 to 25% presented to the emergency department with acute onset crushing substernal chest pain radiating to the left and right chest now admitted w/ CHF exacerbation. 

## 2024-04-28 PROBLEM — I50.22 CHRONIC SYSTOLIC (CONGESTIVE) HEART FAILURE: Chronic | Status: ACTIVE | Noted: 2024-04-19

## 2024-04-28 PROBLEM — Z95.5 PRESENCE OF CORONARY ANGIOPLASTY IMPLANT AND GRAFT: Chronic | Status: ACTIVE | Noted: 2024-04-19

## 2024-04-30 RX ORDER — ASPIRIN/CALCIUM CARB/MAGNESIUM 324 MG
1 TABLET ORAL
Qty: 30 | Refills: 0
Start: 2024-04-30 | End: 2024-05-29

## 2024-04-30 RX ORDER — FUROSEMIDE 40 MG
1 TABLET ORAL
Qty: 30 | Refills: 0
Start: 2024-04-30 | End: 2024-05-29

## 2024-05-30 ENCOUNTER — APPOINTMENT (OUTPATIENT)
Dept: PULMONOLOGY | Facility: CLINIC | Age: 84
End: 2024-05-30
Payer: MEDICAID

## 2024-05-30 VITALS
HEART RATE: 71 BPM | BODY MASS INDEX: 17.43 KG/M2 | TEMPERATURE: 96.4 F | WEIGHT: 108 LBS | OXYGEN SATURATION: 99 % | SYSTOLIC BLOOD PRESSURE: 90 MMHG | DIASTOLIC BLOOD PRESSURE: 52 MMHG

## 2024-05-30 DIAGNOSIS — R91.1 SOLITARY PULMONARY NODULE: ICD-10-CM

## 2024-05-30 PROCEDURE — 94729 DIFFUSING CAPACITY: CPT

## 2024-05-30 PROCEDURE — 94060 EVALUATION OF WHEEZING: CPT

## 2024-05-30 PROCEDURE — 99214 OFFICE O/P EST MOD 30 MIN: CPT | Mod: 25

## 2024-05-30 NOTE — REVIEW OF SYSTEMS
[Fever] : no fever [Fatigue] : no fatigue [Chills] : no chills [Nasal Congestion] : no nasal congestion [Cough] : no cough [Hemoptysis] : no hemoptysis [Chest Tightness] : no chest tightness [Dyspnea] : no dyspnea [Pleuritic Pain] : no pleuritic pain [Chest Discomfort] : no chest discomfort [Edema] : no edema [Nasal Discharge] : no nasal discharge [GERD] : no gerd [Arthralgias] : no arthralgias [Myalgias] : no myalgias [Rash] : no rash [Anemia] : no anemia [Seizures] : no seizures [Anxiety] : no anxiety [Diabetes] : no diabetes [Thyroid Problem] : no thyroid problem

## 2024-05-30 NOTE — HISTORY OF PRESENT ILLNESS
[Former] : former [TextBox_4] : He is an 84-year-old former smoker with a history of hypertension, hyperlipidemia, CHF, coronary artery disease, status post CABG, abdominal aortic aneurysm, status post EVAR on 5/25/2023 who was referred here for a pulmonary nodule noted on CT scan when he was in the hospital.  He was recently hospitalized with "fluid in his lungs."  He is doing better at this time.  Denies any cough, wheezing or shortness of breath at rest.  No fever or chills. [Awakes Unrefreshed] : does not awaken unrefreshed [Difficulty Maintaining Sleep] : does not have difficulty maintaining sleep

## 2024-05-30 NOTE — PROCEDURE
[FreeTextEntry1] : CT Chest 5/24/23: 8 mm nodule, with irregular borders, in the right lower lobe.  See report.  PFT 6/26/23: No obstruction.  Mild restriction.  Mild reduction in diffusion.  No improvement after bronchodilator.  PFT 5/30/24: No obstruction.  No changes to bronchodilator.  Diffusion was normal.

## 2024-05-30 NOTE — PHYSICAL EXAM
[No Acute Distress] : no acute distress [Normal Appearance] : normal appearance [Normal Rate/Rhythm] : normal rate/rhythm [Normal S1, S2] : normal s1, s2 [No Resp Distress] : no resp distress [Clear to Auscultation Bilaterally] : clear to auscultation bilaterally [No HSM] : no hsm [Normal Gait] : normal gait [No Clubbing] : no clubbing [No Cyanosis] : no cyanosis [No Edema] : no edema [No Focal Deficits] : no focal deficits [Oriented x3] : oriented x3 [Normal Affect] : normal affect

## 2024-05-30 NOTE — DISCUSSION/SUMMARY
[FreeTextEntry1] : He is an 84 year-old former smoker with a history of hypertension, hyperlipidemia, coronary artery disease, status post CABG, S/P ICD, 7.1 cm abdominal aortic aneurysm (S/P EVAR on 5/25/2023) who was referred here for a pulmonary nodule noted on CT scan when he was in the hospital.  Impression 9 mm pulmonary nodule right lower lobe -Bronchiectasis -Left apical nodule, less prominent  Recommend Follow up CT requested Nodify Lung obtained Follow up in one month

## 2024-07-01 ENCOUNTER — APPOINTMENT (OUTPATIENT)
Dept: CT IMAGING | Facility: IMAGING CENTER | Age: 84
End: 2024-07-01
Payer: MEDICAID

## 2024-07-01 ENCOUNTER — OUTPATIENT (OUTPATIENT)
Dept: OUTPATIENT SERVICES | Facility: HOSPITAL | Age: 84
LOS: 1 days | End: 2024-07-01
Payer: MEDICAID

## 2024-07-01 DIAGNOSIS — Z95.5 PRESENCE OF CORONARY ANGIOPLASTY IMPLANT AND GRAFT: Chronic | ICD-10-CM

## 2024-07-01 DIAGNOSIS — R91.1 SOLITARY PULMONARY NODULE: ICD-10-CM

## 2024-07-01 DIAGNOSIS — Z95.810 PRESENCE OF AUTOMATIC (IMPLANTABLE) CARDIAC DEFIBRILLATOR: Chronic | ICD-10-CM

## 2024-07-01 DIAGNOSIS — Z95.1 PRESENCE OF AORTOCORONARY BYPASS GRAFT: Chronic | ICD-10-CM

## 2024-07-01 DIAGNOSIS — Z98.890 OTHER SPECIFIED POSTPROCEDURAL STATES: Chronic | ICD-10-CM

## 2024-07-01 PROCEDURE — 71250 CT THORAX DX C-: CPT

## 2024-07-01 PROCEDURE — 71250 CT THORAX DX C-: CPT | Mod: 26

## 2024-07-11 ENCOUNTER — APPOINTMENT (OUTPATIENT)
Dept: PULMONOLOGY | Facility: CLINIC | Age: 84
End: 2024-07-11
Payer: MEDICAID

## 2024-07-11 VITALS
BODY MASS INDEX: 16.95 KG/M2 | OXYGEN SATURATION: 98 % | SYSTOLIC BLOOD PRESSURE: 78 MMHG | WEIGHT: 105 LBS | TEMPERATURE: 98 F | DIASTOLIC BLOOD PRESSURE: 40 MMHG | HEART RATE: 72 BPM

## 2024-07-11 DIAGNOSIS — R91.1 SOLITARY PULMONARY NODULE: ICD-10-CM

## 2024-07-11 PROCEDURE — 99214 OFFICE O/P EST MOD 30 MIN: CPT

## 2024-07-31 ENCOUNTER — APPOINTMENT (OUTPATIENT)
Dept: CARDIOLOGY | Facility: CLINIC | Age: 84
End: 2024-07-31

## 2024-07-31 VITALS — OXYGEN SATURATION: 97 % | HEART RATE: 63 BPM

## 2024-07-31 PROCEDURE — 93000 ELECTROCARDIOGRAM COMPLETE: CPT

## 2024-07-31 PROCEDURE — G2211 COMPLEX E/M VISIT ADD ON: CPT | Mod: NC

## 2024-07-31 PROCEDURE — 99214 OFFICE O/P EST MOD 30 MIN: CPT

## 2024-08-08 ENCOUNTER — TRANSCRIPTION ENCOUNTER (OUTPATIENT)
Age: 84
End: 2024-08-08

## 2024-08-08 ENCOUNTER — RESULT REVIEW (OUTPATIENT)
Age: 84
End: 2024-08-08

## 2024-08-10 ENCOUNTER — TRANSCRIPTION ENCOUNTER (OUTPATIENT)
Age: 84
End: 2024-08-10

## 2024-08-27 ENCOUNTER — RESULT REVIEW (OUTPATIENT)
Age: 84
End: 2024-08-27

## 2024-08-28 ENCOUNTER — APPOINTMENT (OUTPATIENT)
Dept: CARDIOLOGY | Facility: CLINIC | Age: 84
End: 2024-08-28

## 2024-09-05 ENCOUNTER — APPOINTMENT (OUTPATIENT)
Dept: VASCULAR SURGERY | Facility: CLINIC | Age: 84
End: 2024-09-05

## 2024-09-10 ENCOUNTER — RESULT REVIEW (OUTPATIENT)
Age: 84
End: 2024-09-10

## 2024-09-11 ENCOUNTER — RESULT REVIEW (OUTPATIENT)
Age: 84
End: 2024-09-11

## 2025-05-01 NOTE — ED ADULT NURSE NOTE - WILL THE PATIENT ACCEPT THE PFIZER COVID-19 VACCINE IF ELIGIBLE AND IT IS AVAILABLE?
Patient called and stated he would like to know if his refill request can be granted today because he is in need of the refill for Valacyclovir HCl (Valtrex) 1000 MG Tab sent to [facility] pharmacy on [address].   No

## (undated) DEVICE — GOWN TRIMAX LG

## (undated) DEVICE — SUCTION YANKAUER NO CONTROL VENT

## (undated) DEVICE — SUT BIOSYN 4-0 18" P-12

## (undated) DEVICE — DRSG OPSITE 13.75 X 4"

## (undated) DEVICE — DRAPE TOWEL BLUE 17" X 24"

## (undated) DEVICE — SPECIMEN CONTAINER 100ML

## (undated) DEVICE — SUT SOFSILK 4-0 18" TIES

## (undated) DEVICE — SUT PROLENE 6-0 4-30" BV-1

## (undated) DEVICE — TUBING HIGH POWER CONTRAST INJ

## (undated) DEVICE — SOL IRR POUR NS 0.9% 500ML

## (undated) DEVICE — PACK FEM/POP

## (undated) DEVICE — TORQUE DEVICE FOR GUIDEWIRE 0.0100.038"

## (undated) DEVICE — BLADE SCALPEL SAFETYLOCK #11

## (undated) DEVICE — Device

## (undated) DEVICE — DRAPE 1/2 SHEET 40X57"

## (undated) DEVICE — SUT PROLENE 6-0 4-30" C-1

## (undated) DEVICE — PACK BASIN SPECIAL PROCEDURE

## (undated) DEVICE — DRAPE MAYO STAND 30"

## (undated) DEVICE — NDL PERC BASEPLT 18GX7CM

## (undated) DEVICE — GLV 7 PROTEXIS (WHITE)

## (undated) DEVICE — SUT SOFSILK 3-0 18" TIES

## (undated) DEVICE — SET DIL ENDOVASC

## (undated) DEVICE — SOL INJ NS 0.9% 1000ML

## (undated) DEVICE — SOL IRR POUR H2O 250ML

## (undated) DEVICE — DRSG TEGADERM 6"X8"

## (undated) DEVICE — SYR LUER LOK 20CC

## (undated) DEVICE — POSITIONER FOAM EGG CRATE ULNAR 2PCS (PINK)

## (undated) DEVICE — GLV 8.5 PROTEXIS (WHITE)

## (undated) DEVICE — WARMING BLANKET UPPER ADULT

## (undated) DEVICE — GOWN XL

## (undated) DEVICE — SUT POLYSORB 2-0 30" GS-21 UNDYED

## (undated) DEVICE — BLADE SCALPEL SAFETYLOCK #10

## (undated) DEVICE — DRAPE INSTRUMENT POUCH 6.75" X 11"

## (undated) DEVICE — VENODYNE/SCD SLEEVE CALF LARGE

## (undated) DEVICE — DRAPE 3/4 SHEET W REINFORCEMENT 56X77"

## (undated) DEVICE — SUT SOFSILK 0 18" TIES

## (undated) DEVICE — INFLATION DEVICE BASIXCOMPAK

## (undated) DEVICE — GLV 6.5 PROTEXIS (WHITE)

## (undated) DEVICE — DRSG STERISTRIPS 0.5 X 4"

## (undated) DEVICE — TAPE GLO-N-TELL RADIOPAQUE 20 STRIPS

## (undated) DEVICE — SUT SOFSILK 2-0 18" TIES

## (undated) DEVICE — DRAPE EQUIPMENT BANDED BAG 30 X 30" (SHOWER CAP)

## (undated) DEVICE — PREP CHLORAPREP HI-LITE ORANGE 26ML

## (undated) DEVICE — DRAPE IOBAN 23" X 23"

## (undated) DEVICE — GLV 8 PROTEXIS (WHITE)

## (undated) DEVICE — DRAPE FEMORAL ANGIOGRAPHY 80X112"

## (undated) DEVICE — STEALTH CLAMP INSERT SOFT/SOFT 30MM

## (undated) DEVICE — MEDICATION LABELS W MARKER

## (undated) DEVICE — BLADE SCALPEL SAFETYLOCK #15

## (undated) DEVICE — VESSEL LOOP MINI-BLUE 0.075" X 16"

## (undated) DEVICE — FOLEY TRAY 16FR 5CC LTX UMETER CLOSED

## (undated) DEVICE — GLV 7.5 PROTEXIS (WHITE)

## (undated) DEVICE — STAPLER SKIN VISI-STAT 35 WIDE

## (undated) DEVICE — DRAPE C ARM UNIVERSAL

## (undated) DEVICE — SUT PDS II 1 27" CT

## (undated) DEVICE — TUBING PRESSURE 100"